# Patient Record
Sex: MALE | Race: WHITE | Employment: OTHER | URBAN - NONMETROPOLITAN AREA
[De-identification: names, ages, dates, MRNs, and addresses within clinical notes are randomized per-mention and may not be internally consistent; named-entity substitution may affect disease eponyms.]

---

## 2017-01-09 ENCOUNTER — TELEPHONE (OUTPATIENT)
Dept: OTHER | Age: 47
End: 2017-01-09

## 2017-01-12 ENCOUNTER — OFFICE VISIT (OUTPATIENT)
Dept: OTHER | Age: 47
End: 2017-01-12

## 2017-01-12 VITALS
HEART RATE: 100 BPM | DIASTOLIC BLOOD PRESSURE: 68 MMHG | HEIGHT: 72 IN | SYSTOLIC BLOOD PRESSURE: 108 MMHG | WEIGHT: 189 LBS | BODY MASS INDEX: 25.6 KG/M2

## 2017-01-12 DIAGNOSIS — E10.9 CONTROLLED DIABETES MELLITUS TYPE 1 WITHOUT COMPLICATIONS (HCC): Primary | ICD-10-CM

## 2017-01-12 PROCEDURE — G0108 DIAB MANAGE TRN  PER INDIV: HCPCS | Performed by: REGISTERED NURSE

## 2017-01-12 PROCEDURE — 83036 HEMOGLOBIN GLYCOSYLATED A1C: CPT | Performed by: REGISTERED NURSE

## 2017-02-28 ENCOUNTER — TELEPHONE (OUTPATIENT)
Dept: OTHER | Age: 47
End: 2017-02-28

## 2017-03-06 ENCOUNTER — TELEPHONE (OUTPATIENT)
Dept: OTHER | Age: 47
End: 2017-03-06

## 2017-03-07 ENCOUNTER — OFFICE VISIT (OUTPATIENT)
Dept: ENDOCRINOLOGY | Age: 47
End: 2017-03-07

## 2017-03-07 VITALS
HEART RATE: 80 BPM | WEIGHT: 202 LBS | BODY MASS INDEX: 27.36 KG/M2 | RESPIRATION RATE: 18 BRPM | HEIGHT: 72 IN | SYSTOLIC BLOOD PRESSURE: 131 MMHG | DIASTOLIC BLOOD PRESSURE: 72 MMHG

## 2017-03-07 DIAGNOSIS — E10.9 TYPE 1 DIABETES MELLITUS WITHOUT COMPLICATIONS (HCC): Primary | ICD-10-CM

## 2017-03-07 PROCEDURE — 99214 OFFICE O/P EST MOD 30 MIN: CPT | Performed by: CLINICAL NURSE SPECIALIST

## 2017-03-07 ASSESSMENT — ENCOUNTER SYMPTOMS
EYE REDNESS: 0
CONSTIPATION: 0
WHEEZING: 0
NAUSEA: 0
CHEST TIGHTNESS: 0
COUGH: 0
ABDOMINAL PAIN: 0
VOICE CHANGE: 0
DIARRHEA: 0
SHORTNESS OF BREATH: 0

## 2017-03-27 RX ORDER — ATORVASTATIN CALCIUM 10 MG/1
TABLET, FILM COATED ORAL
Qty: 90 TABLET | Refills: 1 | Status: SHIPPED | OUTPATIENT
Start: 2017-03-27 | End: 2017-09-18 | Stop reason: SDUPTHER

## 2017-04-10 ENCOUNTER — OFFICE VISIT (OUTPATIENT)
Dept: OTHER | Age: 47
End: 2017-04-10

## 2017-04-10 VITALS — WEIGHT: 202.2 LBS | HEIGHT: 72 IN | BODY MASS INDEX: 27.39 KG/M2

## 2017-04-10 DIAGNOSIS — E10.9 CONTROLLED DIABETES MELLITUS TYPE 1 WITHOUT COMPLICATIONS (HCC): Primary | ICD-10-CM

## 2017-04-10 LAB — HBA1C MFR BLD: 7.3 % (ref 4.3–5.7)

## 2017-04-10 RX ORDER — FAMOTIDINE 20 MG/1
20 TABLET, FILM COATED ORAL 2 TIMES DAILY
COMMUNITY
End: 2017-05-16

## 2017-05-16 ENCOUNTER — OFFICE VISIT (OUTPATIENT)
Dept: FAMILY MEDICINE CLINIC | Age: 47
End: 2017-05-16

## 2017-05-16 VITALS
DIASTOLIC BLOOD PRESSURE: 76 MMHG | BODY MASS INDEX: 26.72 KG/M2 | HEART RATE: 88 BPM | WEIGHT: 197 LBS | TEMPERATURE: 98 F | SYSTOLIC BLOOD PRESSURE: 108 MMHG | RESPIRATION RATE: 20 BRPM

## 2017-05-16 DIAGNOSIS — K21.9 GASTROESOPHAGEAL REFLUX DISEASE WITHOUT ESOPHAGITIS: Primary | ICD-10-CM

## 2017-05-16 DIAGNOSIS — G47.33 OSA (OBSTRUCTIVE SLEEP APNEA): ICD-10-CM

## 2017-05-16 DIAGNOSIS — N52.9 ERECTILE DYSFUNCTION, UNSPECIFIED ERECTILE DYSFUNCTION TYPE: ICD-10-CM

## 2017-05-16 PROCEDURE — 99213 OFFICE O/P EST LOW 20 MIN: CPT | Performed by: FAMILY MEDICINE

## 2017-05-16 RX ORDER — PANTOPRAZOLE SODIUM 40 MG/1
40 TABLET, DELAYED RELEASE ORAL DAILY
Qty: 30 TABLET | Refills: 3 | Status: SHIPPED | OUTPATIENT
Start: 2017-05-16 | End: 2017-06-22 | Stop reason: SDUPTHER

## 2017-05-16 RX ORDER — TADALAFIL 20 MG/1
TABLET ORAL
Qty: 20 TABLET | Refills: 5 | Status: SHIPPED | OUTPATIENT
Start: 2017-05-16 | End: 2018-02-14 | Stop reason: SDUPTHER

## 2017-05-16 ASSESSMENT — PATIENT HEALTH QUESTIONNAIRE - PHQ9
SUM OF ALL RESPONSES TO PHQ QUESTIONS 1-9: 0
SUM OF ALL RESPONSES TO PHQ9 QUESTIONS 1 & 2: 0
2. FEELING DOWN, DEPRESSED OR HOPELESS: 0
1. LITTLE INTEREST OR PLEASURE IN DOING THINGS: 0

## 2017-05-18 ENCOUNTER — TELEPHONE (OUTPATIENT)
Dept: ENDOCRINOLOGY | Age: 47
End: 2017-05-18

## 2017-05-21 ASSESSMENT — ENCOUNTER SYMPTOMS
EYE PAIN: 0
CONSTIPATION: 0
ABDOMINAL PAIN: 0
SHORTNESS OF BREATH: 0
SINUS PRESSURE: 0
ABDOMINAL DISTENTION: 0
RHINORRHEA: 0
DIARRHEA: 0
COUGH: 0
NAUSEA: 0
SORE THROAT: 0

## 2017-06-06 ENCOUNTER — OFFICE VISIT (OUTPATIENT)
Dept: ENDOCRINOLOGY | Age: 47
End: 2017-06-06

## 2017-06-06 VITALS
RESPIRATION RATE: 18 BRPM | SYSTOLIC BLOOD PRESSURE: 136 MMHG | DIASTOLIC BLOOD PRESSURE: 78 MMHG | BODY MASS INDEX: 27.22 KG/M2 | WEIGHT: 201 LBS | HEIGHT: 72 IN | HEART RATE: 92 BPM

## 2017-06-06 DIAGNOSIS — E78.00 PURE HYPERCHOLESTEROLEMIA: ICD-10-CM

## 2017-06-06 DIAGNOSIS — E10.65 TYPE 1 DIABETES MELLITUS WITH HYPERGLYCEMIA (HCC): Primary | ICD-10-CM

## 2017-06-06 PROCEDURE — 99213 OFFICE O/P EST LOW 20 MIN: CPT | Performed by: INTERNAL MEDICINE

## 2017-06-06 ASSESSMENT — ENCOUNTER SYMPTOMS: BLURRED VISION: 0

## 2017-06-09 RX ORDER — QUINAPRIL 10 MG/1
TABLET ORAL
Qty: 90 TABLET | Refills: 1 | Status: SHIPPED | OUTPATIENT
Start: 2017-06-09 | End: 2018-01-11 | Stop reason: SDUPTHER

## 2017-06-22 RX ORDER — PANTOPRAZOLE SODIUM 40 MG/1
40 TABLET, DELAYED RELEASE ORAL DAILY
Qty: 90 TABLET | Refills: 3 | Status: SHIPPED | OUTPATIENT
Start: 2017-06-22 | End: 2018-08-21 | Stop reason: SDUPTHER

## 2017-06-26 ENCOUNTER — INITIAL CONSULT (OUTPATIENT)
Dept: PULMONOLOGY | Age: 47
End: 2017-06-26

## 2017-06-26 VITALS
HEART RATE: 90 BPM | BODY MASS INDEX: 27.04 KG/M2 | SYSTOLIC BLOOD PRESSURE: 132 MMHG | OXYGEN SATURATION: 98 % | DIASTOLIC BLOOD PRESSURE: 80 MMHG | WEIGHT: 199.6 LBS | HEIGHT: 72 IN

## 2017-06-26 DIAGNOSIS — G47.10 HYPERSOMNIA: Primary | ICD-10-CM

## 2017-06-26 DIAGNOSIS — R06.83 SNORING: ICD-10-CM

## 2017-06-26 DIAGNOSIS — G47.9 SLEEP DISTURBANCE: ICD-10-CM

## 2017-06-26 DIAGNOSIS — R06.81 WITNESSED APNEIC SPELLS: ICD-10-CM

## 2017-06-26 PROCEDURE — 99203 OFFICE O/P NEW LOW 30 MIN: CPT | Performed by: INTERNAL MEDICINE

## 2017-07-03 ENCOUNTER — OFFICE VISIT (OUTPATIENT)
Dept: OTHER | Age: 47
End: 2017-07-03

## 2017-07-03 VITALS — WEIGHT: 202.6 LBS | BODY MASS INDEX: 27.44 KG/M2 | HEIGHT: 72 IN

## 2017-07-03 DIAGNOSIS — E10.9 CONTROLLED DIABETES MELLITUS TYPE 1 WITHOUT COMPLICATIONS (HCC): Primary | ICD-10-CM

## 2017-07-24 ENCOUNTER — HOSPITAL ENCOUNTER (OUTPATIENT)
Dept: PHARMACY | Age: 47
Setting detail: THERAPIES SERIES
Discharge: HOME | End: 2017-07-24
Payer: COMMERCIAL

## 2017-07-24 VITALS — WEIGHT: 202.6 LBS | HEIGHT: 72 IN | BODY MASS INDEX: 27.44 KG/M2

## 2017-07-24 LAB — HBA1C MFR BLD: 6.6 % (ref 4.3–5.7)

## 2017-07-24 PROCEDURE — G0108 DIAB MANAGE TRN  PER INDIV: HCPCS | Performed by: REGISTERED NURSE

## 2017-07-24 PROCEDURE — 83036 HEMOGLOBIN GLYCOSYLATED A1C: CPT | Performed by: REGISTERED NURSE

## 2017-07-24 PROCEDURE — 36416 COLLJ CAPILLARY BLOOD SPEC: CPT | Performed by: REGISTERED NURSE

## 2017-07-25 DIAGNOSIS — G47.33 OSA (OBSTRUCTIVE SLEEP APNEA): Primary | ICD-10-CM

## 2017-08-18 ENCOUNTER — HOSPITAL ENCOUNTER (OUTPATIENT)
Dept: SLEEP CENTER | Age: 47
Discharge: HOME | End: 2017-08-18
Payer: COMMERCIAL

## 2017-08-18 VITALS — BODY MASS INDEX: 26.95 KG/M2 | WEIGHT: 199 LBS | HEIGHT: 72 IN

## 2017-08-18 DIAGNOSIS — G47.33 OSA (OBSTRUCTIVE SLEEP APNEA): ICD-10-CM

## 2017-08-18 PROCEDURE — 95811 POLYSOM 6/>YRS CPAP 4/> PARM: CPT

## 2017-08-21 LAB — STATUS: NORMAL

## 2017-08-28 DIAGNOSIS — G47.33 OSA (OBSTRUCTIVE SLEEP APNEA): Primary | ICD-10-CM

## 2017-08-30 ENCOUNTER — TELEPHONE (OUTPATIENT)
Dept: SLEEP CENTER | Age: 47
End: 2017-08-30

## 2017-08-31 ENCOUNTER — TELEPHONE (OUTPATIENT)
Dept: SLEEP CENTER | Age: 47
End: 2017-08-31

## 2017-09-18 RX ORDER — ATORVASTATIN CALCIUM 10 MG/1
TABLET, FILM COATED ORAL
Qty: 90 TABLET | Refills: 1 | Status: SHIPPED | OUTPATIENT
Start: 2017-09-18 | End: 2018-04-09 | Stop reason: SDUPTHER

## 2017-10-09 ENCOUNTER — OFFICE VISIT (OUTPATIENT)
Dept: ENDOCRINOLOGY | Age: 47
End: 2017-10-09
Payer: COMMERCIAL

## 2017-10-09 VITALS
RESPIRATION RATE: 18 BRPM | HEIGHT: 72 IN | DIASTOLIC BLOOD PRESSURE: 72 MMHG | SYSTOLIC BLOOD PRESSURE: 138 MMHG | BODY MASS INDEX: 28.24 KG/M2 | HEART RATE: 99 BPM | WEIGHT: 208.5 LBS

## 2017-10-09 DIAGNOSIS — E10.9 TYPE 1 DIABETES MELLITUS WITHOUT COMPLICATIONS (HCC): Primary | ICD-10-CM

## 2017-10-09 PROCEDURE — 99213 OFFICE O/P EST LOW 20 MIN: CPT | Performed by: CLINICAL NURSE SPECIALIST

## 2017-10-09 ASSESSMENT — ENCOUNTER SYMPTOMS
ABDOMINAL PAIN: 0
CHEST TIGHTNESS: 0
NAUSEA: 0
SHORTNESS OF BREATH: 0
EYE REDNESS: 0
VOICE CHANGE: 0
DIARRHEA: 0
WHEEZING: 0
CONSTIPATION: 0
COUGH: 0

## 2017-10-09 NOTE — PROGRESS NOTES
constipation, diarrhea and nausea. Endocrine: Negative. Genitourinary: Negative for difficulty urinating, dysuria, frequency and hematuria. Musculoskeletal: Negative for gait problem, myalgias and neck pain. Skin: Negative for rash. Neurological: Negative for dizziness, tremors, facial asymmetry, weakness, numbness and headaches. Hematological: Negative for adenopathy. Psychiatric/Behavioral: Negative for agitation, confusion, sleep disturbance and suicidal ideas. The patient is not nervous/anxious and is not hyperactive. Objective:     Physical Exam   Constitutional: He is oriented to person, place, and time. He appears well-developed and well-nourished. No distress. HENT:   Head: Normocephalic and atraumatic. Right Ear: External ear normal.   Left Ear: External ear normal.   Nose: Nose normal.   Eyes: Conjunctivae and EOM are normal. Pupils are equal, round, and reactive to light. Right eye exhibits no discharge. Left eye exhibits no discharge. No scleral icterus. Neck: Normal range of motion. Neck supple. No thyromegaly present. Cardiovascular: Normal rate, regular rhythm, normal heart sounds and intact distal pulses. No murmur heard. Pulmonary/Chest: Effort normal and breath sounds normal. No stridor. No respiratory distress. He has no wheezes. He has no rales. Abdominal: Soft. Bowel sounds are normal. There is no tenderness. Musculoskeletal: Normal range of motion. He exhibits no edema or tenderness. Lymphadenopathy:     He has no cervical adenopathy. Neurological: He is alert and oriented to person, place, and time. No cranial nerve deficit. Coordination normal.   Skin: Skin is warm and dry. He is not diaphoretic. Psychiatric: He has a normal mood and affect. His behavior is normal. Judgment and thought content normal.       Patient was asked to remove their socks and shoes and a full foot exam was performed.   The following was found during the patient's foot exam: [x]   Normal Exam  Monofilament sensation   [x]   Normal                   []   Abnormal   Pulses   [x]   Normal                      []   Abnormal      Assessment:      /72 (Site: Right Arm, Position: Sitting, Cuff Size: Medium Adult)   Pulse 99   Resp 18   Ht 6' 0.01\" (1.829 m)   Wt 208 lb 8 oz (94.6 kg)   BMI 28.27 kg/m²   1. Type 1 diabetes mellitus without complications (HCC) - no change in insulin pump settings at this time at patient request - with current readings above goal. He wants to work on improved diet - he will change insertion site when he goes home after visit today - discussed glycemic goals, monitoring. Request readings to diabetes educator one week for insulin pump changes as needed. Patient states he is not comfortable making changes without educator, Tiny Odor assistance. Discussed with Tiny Odor as well.   [address]39 BUN 11, creat 0.7, malb/creat 8 on ACE       2. Hyperlipidemia - treated & tolerating 6/2017 chol 159, trig 41, HDL 72, LDL 79, liver panel normal    Plan:      Change insertion site today and every three days  Call in blood sugar readings in one week to diabetes educator       A1c before next visit  No Follow-up on file.        Electronically signed by JESÚS Ocasio on 10/9/2017 at 3:57 PM

## 2017-10-09 NOTE — PATIENT INSTRUCTIONS
Change insertion site today and every three days  Call in blood sugar readings in one week to diabetes educator         A1c before next visit    Follow up in 3 months

## 2017-10-18 ENCOUNTER — TELEPHONE (OUTPATIENT)
Dept: PHARMACY | Age: 47
End: 2017-10-18

## 2017-10-18 NOTE — TELEPHONE ENCOUNTER
Left message requesting that Ayla Yancey download his pump and let the clinci know when he has done so.

## 2017-12-11 ENCOUNTER — OFFICE VISIT (OUTPATIENT)
Dept: PULMONOLOGY | Age: 47
End: 2017-12-11
Payer: COMMERCIAL

## 2017-12-11 VITALS
BODY MASS INDEX: 28.74 KG/M2 | OXYGEN SATURATION: 97 % | HEART RATE: 97 BPM | SYSTOLIC BLOOD PRESSURE: 130 MMHG | HEIGHT: 72 IN | WEIGHT: 212.2 LBS | DIASTOLIC BLOOD PRESSURE: 80 MMHG

## 2017-12-11 DIAGNOSIS — G47.33 OSA ON CPAP: Primary | ICD-10-CM

## 2017-12-11 DIAGNOSIS — Z99.89 OSA ON CPAP: Primary | ICD-10-CM

## 2017-12-11 PROCEDURE — 99213 OFFICE O/P EST LOW 20 MIN: CPT | Performed by: INTERNAL MEDICINE

## 2017-12-11 NOTE — PROGRESS NOTES
place, and time. Psychiatric - Patient  has a normal mood and affect. ASSESSMENT  Obstructive Sleep Apnea (SCOUT)  Interface leak  Still AHI 9 (36.9 original AHI)--detailed report reviewed and mainly obst. events  13 wt gain      RECOMMENDATIONS    Change mask cushion  to mask  RTC 2 mos  Encourage wt reduction    He can call the sleep clinic for an earlier appointment if needed for worsening of sleep symptoms.      FOLLOW UP     2 months with a download

## 2017-12-20 ENCOUNTER — HOSPITAL ENCOUNTER (OUTPATIENT)
Dept: PHARMACY | Age: 47
Setting detail: THERAPIES SERIES
Discharge: HOME | End: 2017-12-20
Payer: COMMERCIAL

## 2017-12-20 VITALS
DIASTOLIC BLOOD PRESSURE: 72 MMHG | WEIGHT: 211 LBS | HEIGHT: 72 IN | SYSTOLIC BLOOD PRESSURE: 126 MMHG | BODY MASS INDEX: 28.58 KG/M2

## 2017-12-20 PROCEDURE — G0108 DIAB MANAGE TRN  PER INDIV: HCPCS | Performed by: REGISTERED NURSE

## 2017-12-20 NOTE — PROGRESS NOTES
Diabetes Clinic at [address], [address]  [phone] (phone)  [phone] (fax)    Patient ID: Meenakshi Bustillos 1970  Referring Provider: Dr. Romano Guardian     Patient's name and  were verified. Subjective:    He presents for His follow-up diabetic visit. He has type 1 diabetes mellitus. Home regimen includes: insulin He is noncompliant some of the time. Assessment:     Lab Results   Component Value Date    LABA1C 6.6 2017    LABA1C 7.3 10/04/2016    BUN 11 2017    CREATININE 0.7 2017     There were no vitals filed for this visit. Wt Readings from Last 3 Encounters:   17 212 lb 3.2 oz (96.3 kg)   10/09/17 208 lb 8 oz (94.6 kg)   17 199 lb (90.3 kg)     Ht Readings from Last 3 Encounters:   17 6' (1.829 m)   10/09/17 6' 0.01\" (1.829 m)   17 6' (1.829 m)       Glucose at 82 hrs PPD today resulted at 4mg/dl  Current monitoring regimen: home blood tests - 3-4 times daily  Home blood sugar trends: per insulin pump therapy  Any episodes of hypoglycemia? yes - 3-4 per week; 3-4pm is highest risk  Previous visit with dietician: yes  Current diet: 3 meals per day. B-smallest                            L- eat out-sand/ fries                            D- meat/ potato/ salad or veg  Current exercise: 2 times per week; walking and push ups ; 30 mins  Eye exam current (within one year): yes 17-laser lt and appt 18 rt eye laser scheduled  Any history of foot problems? no  Last foot exam: 3/2017  Immunizations up to date: no  Taking ASA:  Yes   Appropriate for use of MyChart Glucose Grid:  Yes    Focus: Follow pump visit with Tandem insulin pump/ Dexcom CGM. Weight is up approx 12# since summer. There are jerry high blood sugars than past visit. Liberty Wan report that since using CGM, he has gotten 'sloppier' with managing his blood sugars.  He also has become frustrated with higher BS's and has been adjusting dose of bolus corrections at times vs following pump calcucator. Reviewed need to follow pump settings so results can be evaluated. Also discussed need to check BS's for mealtime insulin dosing at this time. Adjustments made in pump settings and Andrew Munroe is to download again in 1 week for follow up. Next visit 8 weeks. DSME PLAN:   Discussed general issues about diabetes pathophysiology and management. Counseling at today's visit: basal vs bolus; review how settings calculate; pattern management. 1. Basal rates  12am 1.2 units, 4am 1.45 units, 10am 0.9 units, 4pm 1.45 units, 10pm 1.4 units       Carb ratio  12a 8, 4am 9, 10am 10, 4p 8, 10pm 7.5       Correction  12am 35, 10am 27, 4pm 25, 10pm 27  2. Eliminate or limit bedtime snack  3. Use blood sugar and carb amount for boluses--let pump calculate bolus amounts  4. Download pump in 1 week Tues 12/26/17    Meter download, medications, PMH and nursing assessment reviewed with DARIAN Carlos, Pharm D. Coleen Birmingham states He is willing to participate in this plan of care and verbalized understanding of all instructions provided. Teach back used to verify comprehension. Total time involved in direct patient education: 60 minutes.

## 2017-12-27 ENCOUNTER — HOSPITAL ENCOUNTER (OUTPATIENT)
Age: 47
Discharge: HOME | End: 2017-12-27
Payer: COMMERCIAL

## 2017-12-27 LAB
AVERAGE GLUCOSE: 135 MG/DL (ref 70–126)
HBA1C MFR BLD: 6.5 % (ref 4.4–6.4)

## 2017-12-27 PROCEDURE — 36415 COLL VENOUS BLD VENIPUNCTURE: CPT

## 2017-12-27 PROCEDURE — 83036 HEMOGLOBIN GLYCOSYLATED A1C: CPT

## 2017-12-28 ENCOUNTER — TELEPHONE (OUTPATIENT)
Dept: PHARMACY | Age: 47
End: 2017-12-28

## 2017-12-28 NOTE — TELEPHONE ENCOUNTER
Call to Andrew Munroe. Instructed on insulin pump changes  Change insulin pump settings as follows:  Midnight: 1.3 units/hr; 4am, 1.55 units/hr; 10 am, 1.1 units/hr; 4 pm, 1.4 units/hr; 10 pm 1.4 units/hr     ICR: Change the ratio at 4pm to 8.5. Leave all other ICR settings unchanged. Andrew Munroe voices understanding via teach back. Discussed needing to be more regimented with meals/ BG checks before meals and carb limits. Andrew Munroe states he has goals for the first of the year.

## 2017-12-28 NOTE — TELEPHONE ENCOUNTER
Change insulin pump settings as follows:  Midnight: 1.3 units/hr; 4am, 1.55 units/hr; 10 am, 1.1 units/hr; 4 pm, 1.4 units/hr; 10 pm 1.4 units/hr    ICR: Change the ratio at 4pm to 8.5. Leave all other ICR settings unchanged.

## 2018-01-12 RX ORDER — ESCITALOPRAM OXALATE 20 MG/1
20 TABLET ORAL DAILY
Qty: 90 TABLET | Refills: 0 | Status: SHIPPED | OUTPATIENT
Start: 2018-01-12 | End: 2018-04-10 | Stop reason: SDUPTHER

## 2018-01-12 RX ORDER — QUINAPRIL 10 MG/1
TABLET ORAL
Qty: 90 TABLET | Refills: 1 | Status: SHIPPED | OUTPATIENT
Start: 2018-01-12 | End: 2018-07-16 | Stop reason: SDUPTHER

## 2018-01-23 NOTE — TELEPHONE ENCOUNTER
Patients pharmacy contacted our office requesting a 90 day supply of testing strips. He was seen in the office last 10/2017 and is scheduled to return 2-21-18. RX is pending, please advise.

## 2018-01-29 ENCOUNTER — TELEPHONE (OUTPATIENT)
Dept: PHARMACY | Age: 48
End: 2018-01-29

## 2018-01-29 NOTE — TELEPHONE ENCOUNTER
Cancelled appointment today--ill. Call to see how he is doing/ BS's and to reschedule appointment. Left message to call clinic back.

## 2018-02-14 ENCOUNTER — OFFICE VISIT (OUTPATIENT)
Dept: FAMILY MEDICINE CLINIC | Age: 48
End: 2018-02-14
Payer: COMMERCIAL

## 2018-02-14 VITALS
RESPIRATION RATE: 20 BRPM | BODY MASS INDEX: 27.31 KG/M2 | TEMPERATURE: 98.5 F | DIASTOLIC BLOOD PRESSURE: 68 MMHG | HEART RATE: 104 BPM | WEIGHT: 201.4 LBS | SYSTOLIC BLOOD PRESSURE: 130 MMHG

## 2018-02-14 DIAGNOSIS — N52.9 ERECTILE DYSFUNCTION, UNSPECIFIED ERECTILE DYSFUNCTION TYPE: ICD-10-CM

## 2018-02-14 DIAGNOSIS — B34.9 VIRAL SYNDROME: Primary | ICD-10-CM

## 2018-02-14 PROCEDURE — 99213 OFFICE O/P EST LOW 20 MIN: CPT | Performed by: FAMILY MEDICINE

## 2018-02-14 RX ORDER — TADALAFIL 20 MG/1
TABLET ORAL
Qty: 20 TABLET | Refills: 5 | Status: SHIPPED | OUTPATIENT
Start: 2018-02-14 | End: 2018-07-16 | Stop reason: SDUPTHER

## 2018-02-18 ASSESSMENT — ENCOUNTER SYMPTOMS
EYE PAIN: 0
ABDOMINAL PAIN: 0
NAUSEA: 0
DIARRHEA: 0
WHEEZING: 1
ABDOMINAL DISTENTION: 0
SORE THROAT: 0
SINUS PRESSURE: 0
CONSTIPATION: 0
SHORTNESS OF BREATH: 0
RHINORRHEA: 0

## 2018-03-08 ENCOUNTER — OFFICE VISIT (OUTPATIENT)
Dept: ENDOCRINOLOGY | Age: 48
End: 2018-03-08
Payer: COMMERCIAL

## 2018-03-08 VITALS
HEIGHT: 72 IN | HEART RATE: 94 BPM | SYSTOLIC BLOOD PRESSURE: 149 MMHG | DIASTOLIC BLOOD PRESSURE: 84 MMHG | RESPIRATION RATE: 16 BRPM | WEIGHT: 201 LBS | BODY MASS INDEX: 27.22 KG/M2

## 2018-03-08 DIAGNOSIS — E10.8 CONTROLLED DIABETES MELLITUS TYPE 1 WITH COMPLICATIONS (HCC): Primary | ICD-10-CM

## 2018-03-08 DIAGNOSIS — E78.00 PURE HYPERCHOLESTEROLEMIA: ICD-10-CM

## 2018-03-08 LAB — HBA1C MFR BLD: 6.8 %

## 2018-03-08 PROCEDURE — 99215 OFFICE O/P EST HI 40 MIN: CPT | Performed by: INTERNAL MEDICINE

## 2018-03-08 PROCEDURE — 83036 HEMOGLOBIN GLYCOSYLATED A1C: CPT | Performed by: INTERNAL MEDICINE

## 2018-03-08 ASSESSMENT — ENCOUNTER SYMPTOMS
PHOTOPHOBIA: 0
ABDOMINAL PAIN: 0
NAUSEA: 0
ABDOMINAL DISTENTION: 0
WHEEZING: 0
VOMITING: 0
DIARRHEA: 0
CONSTIPATION: 0
CHEST TIGHTNESS: 0
SHORTNESS OF BREATH: 0
STRIDOR: 0

## 2018-03-08 NOTE — PROGRESS NOTES
distress   Oropharynx: normal findings: lips normal without lesions, buccal mucosa normal, gums healthy and tongue mid    Eyes:  negative findings: lids and lashes normal, conjunctivae and sclerae normal, corneas clear        Neck: no adenopathy, no carotid bruit, no JVD, supple, symmetrical, trachea midline and thyroid not enlarged, symmetric, no tenderness/mass/nodules   Thyroid:  no palpable nodule   Lung: clear to auscultation bilaterally   Heart:  regular rate and rhythm, S1, S2 normal, no murmur, click, rub or gallop    Abdomen: soft, non-tender; bowel sounds normal; no masses,  no organomegaly   Extremities: extremities normal, atraumatic, no cyanosis or edema    Pulses: 2+ and symmetric   Skin: warm and dry, no hyperpigmentation, vitiligo, or suspicious lesions   Neuro: normal without focal findings, mental status, speech normal, alert and oriented x3       Patient was asked to remove their socks and shoes and a full foot exam was performed. The following was found during the patient's foot exam:   [x]  Normal Exam  Monofilament sensation [x] Normal  [] Abnormal   Pulses [x] Normal  [] Abnormal   No lesions      Labs:    Lab Results   Component Value Date    WBC 6.2 06/26/2015    HGB 16.0 06/26/2015    HCT 47.0 06/26/2015     06/26/2015    CHOL 159 06/17/2017    TRIG 41 06/17/2017    HDL 72 06/17/2017    ALT 22 06/17/2017    AST 18 06/17/2017     06/17/2017    K 5.0 06/17/2017     06/17/2017    CREATININE 0.7 06/17/2017    CALCIUM 9.6 06/17/2017    BUN 11 06/17/2017    CO2 26 06/17/2017    TSH 2.640 06/17/2017    T4FREE 1.32 06/17/2017    LABA1C 6.8 03/08/2018    LABMICR < 1.20 06/17/2017       ASSESSMENT/PLAN:      1. Controlled diabetes mellitus type 1 with complications (HCC)  NIQ6T at goal.  Trend noted for hyperglycemia overnight. Increase carb ratio at 10 PM from 1:7. g to 1:6.5 g.  Other settings stay the same.   Script sent for glucagon in the event of severe lows as well as ketone strips to test for DKA. Check Bgs 5-6 times per day  BG Goal  Fasting and premeal  mg/dl ; 2hr pp < 180 mg/dl. HbA1c goal < 7 %  Call if he has  hypoglycemia    - POCT glycosylated hemoglobin (Hb A1C)  -  DIABETES FOOT EXAM  - TSH without Reflex; Future  - Basic Metabolic Panel; Future  - Hemoglobin A1C; Future  - glucagon 1 MG injection; Inject 1 mg into the skin See Admin Instructions Follow package directions for low blood sugar. Dispense: 1 kit; Refill: 3  - acetone, urine, test strip; Please test urine for ketones if you have nausea, vomiting and abdominal pain. Dispense: 10 strip; Refill: 1    2. Pure hypercholesterolemia  Continue statin. I spent 45 minutes with this patient. Greater than 50% of visit discussing glycemic management, reviewing/discussing pump download, diagnoses as above, reviewing/ordering labs, changing/addressing medication, answering questions    Orders Placed This Encounter   Procedures    TSH without Reflex     Standing Status:   Future     Standing Expiration Date:   3/8/2019    Basic Metabolic Panel     Standing Status:   Future     Standing Expiration Date:   3/8/2019    Hemoglobin A1C     Standing Status:   Future     Standing Expiration Date:   3/8/2019    POCT glycosylated hemoglobin (Hb A1C)     DIABETES FOOT EXAM         Return in about 3 months (around 6/8/2018).

## 2018-04-09 RX ORDER — ATORVASTATIN CALCIUM 10 MG/1
TABLET, FILM COATED ORAL
Qty: 90 TABLET | Refills: 1 | Status: SHIPPED | OUTPATIENT
Start: 2018-04-09 | End: 2018-11-26 | Stop reason: SDUPTHER

## 2018-04-10 ENCOUNTER — TELEPHONE (OUTPATIENT)
Dept: INTERNAL MEDICINE CLINIC | Age: 48
End: 2018-04-10

## 2018-04-10 RX ORDER — ESCITALOPRAM OXALATE 20 MG/1
20 TABLET ORAL DAILY
Qty: 90 TABLET | Refills: 2 | Status: SHIPPED | OUTPATIENT
Start: 2018-04-10 | End: 2019-02-07 | Stop reason: SDUPTHER

## 2018-05-14 ENCOUNTER — OFFICE VISIT (OUTPATIENT)
Dept: INTERNAL MEDICINE CLINIC | Age: 48
End: 2018-05-14
Payer: COMMERCIAL

## 2018-05-14 VITALS
BODY MASS INDEX: 27.96 KG/M2 | SYSTOLIC BLOOD PRESSURE: 118 MMHG | HEIGHT: 72 IN | WEIGHT: 206.4 LBS | DIASTOLIC BLOOD PRESSURE: 62 MMHG

## 2018-05-14 DIAGNOSIS — E10.8 CONTROLLED DIABETES MELLITUS TYPE 1 WITH COMPLICATIONS (HCC): ICD-10-CM

## 2018-05-14 PROCEDURE — G0108 DIAB MANAGE TRN  PER INDIV: HCPCS | Performed by: NURSE PRACTITIONER

## 2018-05-14 PROCEDURE — 99999 PR OFFICE/OUTPT VISIT,PROCEDURE ONLY: CPT | Performed by: NURSE PRACTITIONER

## 2018-07-11 ENCOUNTER — HOSPITAL ENCOUNTER (OUTPATIENT)
Age: 48
Discharge: HOME | End: 2018-07-11
Payer: COMMERCIAL

## 2018-07-11 LAB
ANION GAP SERPL CALCULATED.3IONS-SCNC: 10 MEQ/L (ref 8–16)
AVERAGE GLUCOSE: 129 MG/DL (ref 70–126)
BUN BLDV-MCNC: 12 MG/DL (ref 7–22)
CALCIUM SERPL-MCNC: 9.3 MG/DL (ref 8.5–10.5)
CHLORIDE BLD-SCNC: 104 MEQ/L (ref 98–111)
CO2: 28 MEQ/L (ref 23–33)
CREAT SERPL-MCNC: 0.7 MG/DL (ref 0.4–1.2)
GFR SERPL CREATININE-BSD FRML MDRD: > 90 ML/MIN/1.73M2
GLUCOSE BLD-MCNC: 113 MG/DL (ref 70–108)
HBA1C MFR BLD: 6.3 % (ref 4.4–6.4)
POTASSIUM SERPL-SCNC: 5 MEQ/L (ref 3.5–5.2)
SODIUM BLD-SCNC: 142 MEQ/L (ref 135–145)
TSH SERPL DL<=0.05 MIU/L-ACNC: 0.98 UIU/ML (ref 0.4–4.2)

## 2018-07-11 PROCEDURE — 83036 HEMOGLOBIN GLYCOSYLATED A1C: CPT

## 2018-07-11 PROCEDURE — 36415 COLL VENOUS BLD VENIPUNCTURE: CPT

## 2018-07-11 PROCEDURE — 80048 BASIC METABOLIC PNL TOTAL CA: CPT

## 2018-07-11 PROCEDURE — 84443 ASSAY THYROID STIM HORMONE: CPT

## 2018-07-16 ENCOUNTER — OFFICE VISIT (OUTPATIENT)
Dept: INTERNAL MEDICINE CLINIC | Age: 48
End: 2018-07-16
Payer: COMMERCIAL

## 2018-07-16 VITALS
HEART RATE: 86 BPM | RESPIRATION RATE: 16 BRPM | SYSTOLIC BLOOD PRESSURE: 134 MMHG | BODY MASS INDEX: 27.02 KG/M2 | DIASTOLIC BLOOD PRESSURE: 72 MMHG | WEIGHT: 199.5 LBS | HEIGHT: 72 IN

## 2018-07-16 DIAGNOSIS — E10.65 TYPE 1 DIABETES MELLITUS WITH HYPERGLYCEMIA (HCC): Primary | ICD-10-CM

## 2018-07-16 DIAGNOSIS — E78.00 PURE HYPERCHOLESTEROLEMIA: ICD-10-CM

## 2018-07-16 DIAGNOSIS — N52.9 ERECTILE DYSFUNCTION, UNSPECIFIED ERECTILE DYSFUNCTION TYPE: ICD-10-CM

## 2018-07-16 PROCEDURE — 99214 OFFICE O/P EST MOD 30 MIN: CPT | Performed by: NURSE PRACTITIONER

## 2018-07-16 RX ORDER — QUINAPRIL 10 MG/1
TABLET ORAL
Qty: 90 TABLET | Refills: 1 | Status: SHIPPED | OUTPATIENT
Start: 2018-07-16 | End: 2019-02-07 | Stop reason: SDUPTHER

## 2018-07-16 RX ORDER — TADALAFIL 20 MG/1
TABLET ORAL
Qty: 40 TABLET | Refills: 1 | Status: SHIPPED | OUTPATIENT
Start: 2018-07-16 | End: 2019-12-10 | Stop reason: SDUPTHER

## 2018-07-16 ASSESSMENT — PATIENT HEALTH QUESTIONNAIRE - PHQ9
SUM OF ALL RESPONSES TO PHQ9 QUESTIONS 1 & 2: 0
1. LITTLE INTEREST OR PLEASURE IN DOING THINGS: 0
2. FEELING DOWN, DEPRESSED OR HOPELESS: 0
SUM OF ALL RESPONSES TO PHQ QUESTIONS 1-9: 0

## 2018-07-16 ASSESSMENT — ENCOUNTER SYMPTOMS
VOMITING: 0
ABDOMINAL PAIN: 0
TROUBLE SWALLOWING: 0
NAUSEA: 1
VOICE CHANGE: 0
SHORTNESS OF BREATH: 0

## 2018-07-16 NOTE — PROGRESS NOTES
Subjective:      Patient ID: Alivia Mcdonough is a 56 y.o. male. HPI     Presents for follow up appointment regarding type 1 diabetes, diagnosed in 1982. Last seen by Dr. Nneka Barrios 3/8/2018. Also following with the diabetic clinic, last visit with JOSÉ MIGUEL 5/14/2018. Currently maintained on the Tslim for the last 2 years. Reports changing sites every 3-4 times. No abdominal scar tissue reported. Not wearing Dexcom sensor. Current pump settings: basal 0000 1.2 0400 1.6, 1000 1.1, 1600 1.4, 2200 1.4, ISF 35/27/25/27, ICR 8/9/10/8.5/6.5 target 100-120. Checking glucose 4-6 times per day with good compliance. See media section for details. Labile BS readings noted. No clear trend. A1c 6.3%. Hypo and hyperglycemia are minimized. Has a glucagon pen, has not ever used it. No seizure induced hypoglycemia reported. Lowest BS on pump 48. Symptomatic when BS <60 and include feeling ill and diaphoresis. Exercising daily with 20 min cardio and resistance training. Carb loads prior to exercise to prevent hypoglycemia. Eating on average 50-60 carbs with meals. Denies polyuria or polydipsia. No numbness or tingling to the lower extremities reported. Vision changes are reported, had injection today. HX of retinopathy. Past Medical History:   Diagnosis Date    Anxiety     Ganglion cyst     Hyperlipidemia     Type I (juvenile type) diabetes mellitus without mention of complication, not stated as uncontrolled     12/1982     Family History   Problem Relation Age of Onset    Heart Disease Father     Asthma Father     High Blood Pressure Father      Social History     Social History    Marital status:      Spouse name: N/A    Number of children: N/A    Years of education: N/A     Occupational History    Not on file.      Social History Main Topics    Smoking status: Former Smoker     Packs/day: 0.25     Years: 15.00     Quit date: 1/26/2002    Smokeless tobacco: Never Used    Alcohol use 0.0 oz/week      Comment: 2-3 light

## 2018-08-14 ENCOUNTER — ANESTHESIA EVENT (OUTPATIENT)
Dept: OPERATING ROOM | Age: 48
End: 2018-08-14
Payer: COMMERCIAL

## 2018-08-15 ENCOUNTER — HOSPITAL ENCOUNTER (OUTPATIENT)
Age: 48
Setting detail: OUTPATIENT SURGERY
Discharge: HOME | End: 2018-08-15
Attending: OPHTHALMOLOGY | Admitting: OPHTHALMOLOGY
Payer: COMMERCIAL

## 2018-08-15 ENCOUNTER — ANESTHESIA (OUTPATIENT)
Dept: OPERATING ROOM | Age: 48
End: 2018-08-15
Payer: COMMERCIAL

## 2018-08-15 VITALS
OXYGEN SATURATION: 99 % | RESPIRATION RATE: 14 BRPM | WEIGHT: 200 LBS | HEART RATE: 72 BPM | DIASTOLIC BLOOD PRESSURE: 69 MMHG | HEIGHT: 72 IN | BODY MASS INDEX: 27.09 KG/M2 | SYSTOLIC BLOOD PRESSURE: 127 MMHG | TEMPERATURE: 97 F

## 2018-08-15 VITALS — OXYGEN SATURATION: 99 % | SYSTOLIC BLOOD PRESSURE: 127 MMHG | DIASTOLIC BLOOD PRESSURE: 78 MMHG

## 2018-08-15 PROBLEM — H33.41 RETINAL DETACHMENT, TRACTIONAL, RIGHT: Status: ACTIVE | Noted: 2018-08-15

## 2018-08-15 LAB
EKG ATRIAL RATE: 72 BPM
EKG P AXIS: 60 DEGREES
EKG P-R INTERVAL: 164 MS
EKG Q-T INTERVAL: 384 MS
EKG QRS DURATION: 80 MS
EKG QTC CALCULATION (BAZETT): 420 MS
EKG R AXIS: 48 DEGREES
EKG T AXIS: 46 DEGREES
EKG VENTRICULAR RATE: 72 BPM
GLUCOSE BLD-MCNC: 138 MG/DL (ref 75–110)
GLUCOSE BLD-MCNC: 140 MG/DL (ref 75–110)
GLUCOSE BLD-MCNC: 223 MG/DL (ref 74–100)
POC POTASSIUM: 4.2 MMOL/L (ref 3.5–4.5)

## 2018-08-15 PROCEDURE — 3600000014 HC SURGERY LEVEL 4 ADDTL 15MIN: Performed by: OPHTHALMOLOGY

## 2018-08-15 PROCEDURE — 3600000004 HC SURGERY LEVEL 4 BASE: Performed by: OPHTHALMOLOGY

## 2018-08-15 PROCEDURE — 6360000002 HC RX W HCPCS: Performed by: OPHTHALMOLOGY

## 2018-08-15 PROCEDURE — 6370000000 HC RX 637 (ALT 250 FOR IP): Performed by: OPHTHALMOLOGY

## 2018-08-15 PROCEDURE — 2709999900 HC NON-CHARGEABLE SUPPLY: Performed by: OPHTHALMOLOGY

## 2018-08-15 PROCEDURE — 3700000001 HC ADD 15 MINUTES (ANESTHESIA): Performed by: OPHTHALMOLOGY

## 2018-08-15 PROCEDURE — 82947 ASSAY GLUCOSE BLOOD QUANT: CPT

## 2018-08-15 PROCEDURE — 2580000003 HC RX 258: Performed by: ANESTHESIOLOGY

## 2018-08-15 PROCEDURE — 84132 ASSAY OF SERUM POTASSIUM: CPT

## 2018-08-15 PROCEDURE — 6360000002 HC RX W HCPCS: Performed by: NURSE ANESTHETIST, CERTIFIED REGISTERED

## 2018-08-15 PROCEDURE — 2500000003 HC RX 250 WO HCPCS: Performed by: OPHTHALMOLOGY

## 2018-08-15 PROCEDURE — 3700000000 HC ANESTHESIA ATTENDED CARE: Performed by: OPHTHALMOLOGY

## 2018-08-15 PROCEDURE — 7100000041 HC SPAR PHASE II RECOVERY - ADDTL 15 MIN: Performed by: OPHTHALMOLOGY

## 2018-08-15 PROCEDURE — 2580000003 HC RX 258: Performed by: OPHTHALMOLOGY

## 2018-08-15 PROCEDURE — 93005 ELECTROCARDIOGRAM TRACING: CPT

## 2018-08-15 PROCEDURE — 7100000040 HC SPAR PHASE II RECOVERY - FIRST 15 MIN: Performed by: OPHTHALMOLOGY

## 2018-08-15 PROCEDURE — 2720000010 HC SURG SUPPLY STERILE: Performed by: OPHTHALMOLOGY

## 2018-08-15 RX ORDER — FENTANYL CITRATE 50 UG/ML
INJECTION, SOLUTION INTRAMUSCULAR; INTRAVENOUS PRN
Status: DISCONTINUED | OUTPATIENT
Start: 2018-08-15 | End: 2018-08-15 | Stop reason: SDUPTHER

## 2018-08-15 RX ORDER — ERYTHROMYCIN 5 MG/G
OINTMENT OPHTHALMIC PRN
Status: DISCONTINUED | OUTPATIENT
Start: 2018-08-15 | End: 2018-08-15 | Stop reason: HOSPADM

## 2018-08-15 RX ORDER — BALANCED SALT SOLUTION ENRICHED WITH BICARBONATE, DEXTROSE, AND GLUTATHIONE
KIT INTRAOCULAR PRN
Status: DISCONTINUED | OUTPATIENT
Start: 2018-08-15 | End: 2018-08-15 | Stop reason: HOSPADM

## 2018-08-15 RX ORDER — SODIUM CHLORIDE, SODIUM LACTATE, POTASSIUM CHLORIDE, CALCIUM CHLORIDE 600; 310; 30; 20 MG/100ML; MG/100ML; MG/100ML; MG/100ML
INJECTION, SOLUTION INTRAVENOUS CONTINUOUS
Status: DISCONTINUED | OUTPATIENT
Start: 2018-08-15 | End: 2018-08-15 | Stop reason: HOSPADM

## 2018-08-15 RX ORDER — LIDOCAINE HYDROCHLORIDE 10 MG/ML
1 INJECTION, SOLUTION EPIDURAL; INFILTRATION; INTRACAUDAL; PERINEURAL
Status: DISCONTINUED | OUTPATIENT
Start: 2018-08-15 | End: 2018-08-15 | Stop reason: HOSPADM

## 2018-08-15 RX ORDER — MIDAZOLAM HYDROCHLORIDE 1 MG/ML
INJECTION INTRAMUSCULAR; INTRAVENOUS PRN
Status: DISCONTINUED | OUTPATIENT
Start: 2018-08-15 | End: 2018-08-15 | Stop reason: SDUPTHER

## 2018-08-15 RX ORDER — CYCLOPENTOLATE HYDROCHLORIDE 10 MG/ML
1 SOLUTION/ DROPS OPHTHALMIC EVERY 5 MIN PRN
Status: COMPLETED | OUTPATIENT
Start: 2018-08-15 | End: 2018-08-15

## 2018-08-15 RX ORDER — TIMOLOL MALEATE 5 MG/ML
SOLUTION/ DROPS OPHTHALMIC PRN
Status: DISCONTINUED | OUTPATIENT
Start: 2018-08-15 | End: 2018-08-15 | Stop reason: HOSPADM

## 2018-08-15 RX ORDER — BUPIVACAINE HYDROCHLORIDE 7.5 MG/ML
INJECTION, SOLUTION EPIDURAL; RETROBULBAR PRN
Status: DISCONTINUED | OUTPATIENT
Start: 2018-08-15 | End: 2018-08-15 | Stop reason: HOSPADM

## 2018-08-15 RX ORDER — CEFAZOLIN SODIUM 500 MG/2.2ML
INJECTION, POWDER, FOR SOLUTION INTRAMUSCULAR; INTRAVENOUS PRN
Status: DISCONTINUED | OUTPATIENT
Start: 2018-08-15 | End: 2018-08-15 | Stop reason: HOSPADM

## 2018-08-15 RX ORDER — PHENYLEPHRINE HYDROCHLORIDE 100 MG/ML
1 SOLUTION/ DROPS OPHTHALMIC EVERY 5 MIN PRN
Status: COMPLETED | OUTPATIENT
Start: 2018-08-15 | End: 2018-08-15

## 2018-08-15 RX ORDER — 0.9 % SODIUM CHLORIDE 0.9 %
VIAL (ML) INJECTION PRN
Status: DISCONTINUED | OUTPATIENT
Start: 2018-08-15 | End: 2018-08-15 | Stop reason: HOSPADM

## 2018-08-15 RX ORDER — LIDOCAINE HYDROCHLORIDE 20 MG/ML
INJECTION, SOLUTION INFILTRATION; PERINEURAL PRN
Status: DISCONTINUED | OUTPATIENT
Start: 2018-08-15 | End: 2018-08-15 | Stop reason: HOSPADM

## 2018-08-15 RX ORDER — ATROPINE SULFATE 10 MG/ML
1 SOLUTION/ DROPS OPHTHALMIC
Status: COMPLETED | OUTPATIENT
Start: 2018-08-15 | End: 2018-08-15

## 2018-08-15 RX ADMIN — GENTAMICIN, PREDNISOLONE ACETATE 1 DROP: 3; 10 SUSPENSION/ DROPS OPHTHALMIC at 10:44

## 2018-08-15 RX ADMIN — CYCLOPENTOLATE HYDROCHLORIDE 1 DROP: 10 SOLUTION/ DROPS OPHTHALMIC at 10:43

## 2018-08-15 RX ADMIN — SODIUM CHLORIDE, POTASSIUM CHLORIDE, SODIUM LACTATE AND CALCIUM CHLORIDE: 600; 310; 30; 20 INJECTION, SOLUTION INTRAVENOUS at 10:09

## 2018-08-15 RX ADMIN — FENTANYL CITRATE 50 MCG: 50 INJECTION INTRAMUSCULAR; INTRAVENOUS at 12:10

## 2018-08-15 RX ADMIN — PHENYLEPHRINE HYDROCHLORIDE 1 DROP: 100 SOLUTION/ DROPS OPHTHALMIC at 10:43

## 2018-08-15 RX ADMIN — PHENYLEPHRINE HYDROCHLORIDE 1 DROP: 100 SOLUTION/ DROPS OPHTHALMIC at 10:18

## 2018-08-15 RX ADMIN — CYCLOPENTOLATE HYDROCHLORIDE 1 DROP: 10 SOLUTION/ DROPS OPHTHALMIC at 10:19

## 2018-08-15 RX ADMIN — PHENYLEPHRINE HYDROCHLORIDE 1 DROP: 100 SOLUTION/ DROPS OPHTHALMIC at 10:35

## 2018-08-15 RX ADMIN — FENTANYL CITRATE 50 MCG: 50 INJECTION INTRAMUSCULAR; INTRAVENOUS at 12:09

## 2018-08-15 RX ADMIN — ATROPINE SULFATE 1 DROP: 10 SOLUTION/ DROPS OPHTHALMIC at 10:16

## 2018-08-15 RX ADMIN — MIDAZOLAM HYDROCHLORIDE 2 MG: 1 INJECTION, SOLUTION INTRAMUSCULAR; INTRAVENOUS at 12:09

## 2018-08-15 RX ADMIN — CYCLOPENTOLATE HYDROCHLORIDE 1 DROP: 10 SOLUTION/ DROPS OPHTHALMIC at 10:35

## 2018-08-15 ASSESSMENT — PULMONARY FUNCTION TESTS
PIF_VALUE: 1
PIF_VALUE: 4
PIF_VALUE: 1

## 2018-08-15 ASSESSMENT — PAIN SCALES - GENERAL
PAINLEVEL_OUTOF10: 0

## 2018-08-15 ASSESSMENT — LIFESTYLE VARIABLES: SMOKING_STATUS: 0

## 2018-08-15 ASSESSMENT — PAIN - FUNCTIONAL ASSESSMENT: PAIN_FUNCTIONAL_ASSESSMENT: 0-10

## 2018-08-15 NOTE — H&P
MG injection Inject 1 mg into the skin See Admin Instructions Follow package directions for low blood sugar. 3/8/18   Melody Burnette MD   acetone, urine, test strip Please test urine for ketones if you have nausea, vomiting and abdominal pain. 3/8/18   Melody Burnette MD   pantoprazole (PROTONIX) 40 MG tablet Take 1 tablet by mouth daily  Patient taking differently: Take 40 mg by mouth nightly  6/22/17   JESÚS Gomez - CNP   Insulin Syringes, Disposable, U-100 0.3 ML MISC 1 each by Does not apply route daily 2/8/16   Jf Hernandez MD   Insulin Syringe-Needle U-100 31G X 5/16\" 0.5 ML MISC 1 each by Does not apply route 4 times daily 12/21/15   Jf Hernandez MD     Past Medical History    has a past medical history of Anxiety; Detached retina; Diabetic retinopathy ([facility] [address] Ganglion cyst; Heart burn; Hyperlipidemia; Hypertension; Insulin pump in place; Neuropathy; Pneumonia; Retinal detachment; Sleep apnea; Type I (juvenile type) diabetes mellitus without mention of complication, not stated as uncontrolled; and Wears glasses. Past Surgical History   has a past surgical history that includes Kegley tooth extraction (2005); cyst removal (1992); Refractive surgery (2016); and eye surgery. Social History   reports that he quit smoking about 16 years ago. He has a 3.75 pack-year smoking history. His smokeless tobacco use includes Chew.   reports that he drinks about 1.8 oz of alcohol per week . reports that he does not use drugs. Marital Status   Occupation  for [employer]   Family History  Family Status   Relation Status    Mother Alive    Father Alive    Sister Alive   hospitals     family history includes Asthma in his father; Heart Disease in his father; High Blood Pressure in his father. OBJECTIVE:   VITALS:  height is 6' (1.829 m) and weight is 200 lb (90.7 kg). His temporal temperature is 98.4 °F (36.9 °C). His blood pressure is 145/85 (abnormal) and his pulse is 73. His respiration is 20 and oxygen saturation is 98%. CONSTITUTIONAL:alert & orientated x 3, no acute distress. Friendly. SKIN:  Warm and dry, no rashes   HEAD:  Normocephalic, atraumatic   EYES: EOMI. Wearing glasses. Right pupil dilated s/p eye drops  EARS:  Hearing grossly WNL. NOSE:  Nares patent. No rhinorrhea   MOUTH/THROAT:  Benign  NECK:supple, no lymphadenopathy  LUNGS: Respirations even and non-labored. Clear to auscultation bilaterally, no wheezes, rales, or rhonchi. CARDIOVASCULAR: Regular rate and rhythm. ABDOMEN: soft, non tender, non distended, no masses or organomegaly, insulin pump left abdomen   EXTREMITIES: no edema bilateral lower extremities. Peripheral pulses are intact     IMPRESSIONS:   1. Retinal detachment       Diagnosis Date    Anxiety     Detached retina 08/2018    right    Diabetic retinopathy ([address]     bilat, has had laser and injections    Ganglion cyst     Heart burn     Hyperlipidemia     Hypertension     Insulin pump in place     Neuropathy     Pneumonia     at age 26   [facility] Retinal detachment     RIGHT    Sleep apnea     has Cpap at home but does not wear like suppose to    Type I (juvenile type) diabetes mellitus without mention of complication, not stated as uncontrolled     12/1982    Wears glasses     usually uses contact lenses   2.    PLANS:   1. Vitrectomy- OD    AXEL LEVIN CNP  Electronically signed 8/15/2018 at 10:50 AM

## 2018-08-15 NOTE — ANESTHESIA POSTPROCEDURE EVALUATION
Department of Anesthesiology  Postprocedure Note    Patient: Marilu Colon  MRN: [id number]  YOB: 1970  Date of evaluation: 8/15/2018  Time:  1:59 PM     Procedure Summary     Date:  08/15/18 Room / Location:  [facility] OR  / [facility] OR    Anesthesia Start:  4351 Anesthesia Stop:  1310    Procedure:  VITRECTOMY 23 GAUGE, MEMBRANE PEELING, AIR FLUID EXCHANGE, AIR GAS EXCHANGE WITH 16% C3F8, ENDOLASER, 200 MSECONDS, 200 MWATTS, 809 PULSES (Right ) Diagnosis:  (TRACTION RETINAL DETACHMENT )    Surgeon:  Trang Terrazas MD Responsible Provider:  Monique Kerr MD    Anesthesia Type:  MAC ASA Status:  4          Anesthesia Type: MAC    Tony Phase I:      Tony Phase II: Tony Score: 10    Last vitals: Reviewed and per EMR flowsheets.        Anesthesia Post Evaluation    Patient location during evaluation: PACU  Patient participation: complete - patient participated  Level of consciousness: awake and alert  Pain score: 0  Nausea & Vomiting: no nausea  Cardiovascular status: hemodynamically stable  Respiratory status: room air  Hydration status: euvolemic

## 2018-08-15 NOTE — PROGRESS NOTES
Discharge instructions including prescription (tylenol #3), eye drops, and eye kit reviewed with patient and wife. Both acknowledged understanding.

## 2018-08-15 NOTE — ANESTHESIA PRE PROCEDURE
Department of Anesthesiology  Preprocedure Note       Name:  Gordy Medina   Age:  56 y.o.  :  1970                                          MRN:  [id number]         Date:  8/15/2018      Surgeon: Genny Mejia):  Alfonso Uribe MD    Procedure: Procedure(s):  VITRECTOMY 23 GAUGE, MEMBRANE PEELING, GAS FLUID EXCHANGE, LASER    Department of Anesthesiology  Pre-Anesthesia Evaluation/Consultation         Name:  Gordy Medina                                         Age:  56 y.o.   MRN:  [id number]             Medications  Current Facility-Administered Medications   Medication Dose Route Frequency Provider Last Rate Last Dose    atropine 1 % ophthalmic solution 1 drop  1 drop Right Eye On Call to [facility]., MD        gentamicin-prednisoLONE 0.3-1 % ophthalmic drops 1 drop  1 drop Right Eye On Call to [facility] 67., MD        cyclopentolate (CYCLOGYL) 1 % ophthalmic solution 1 drop  1 drop Right Eye Q5 Min PRN Alfonso Uribe MD        phenylephrine (SHERRY-SYNEPHRINE) 10 % ophthalmic solution 1 drop  1 drop Right Eye Q5 Min PRN Alfonso Uribe MD        lactated ringers infusion   Intravenous Continuous Christian West MD        lidocaine PF 1 % injection 1 mL  1 mL Intradermal Once PRN Christian West MD           No Known Allergies  Patient Active Problem List   Diagnosis    Hyperlipidemia    Anxiety    External otitis    Diabetes type 1, controlled ([address]     Past Medical History:   Diagnosis Date    Anxiety     Detached retina 2018    right    Diabetic retinopathy ([address]     bilat, has had laser and injections    Ganglion cyst     Heart burn     Hyperlipidemia     Hypertension     Pneumonia     at age 26    Sleep apnea     has Cpap at home but does not wear like suppose to    Type I (juvenile type) diabetes mellitus without mention of complication, not stated as uncontrolled     1982     Past Surgical History:   Procedure Laterality Date    CYST REMOVAL      rt foot, ganglion  EYE SURGERY      has had laser to both eyes in office    REFRACTIVE SURGERY  2016    has never had lasik eye surgery    WISDOM TOOTH EXTRACTION  2005     Social History   Substance Use Topics    Smoking status: Former Smoker     Packs/day: 0.25     Years: 15.00     Quit date: 1/26/2002    Smokeless tobacco: Current User     Types: Chew    Alcohol use 1.8 oz/week     3 Cans of beer per week      Comment: daily         Vital Signs (Current) There were no vitals filed for this visit. Vital Signs Statistics (for past 48 hrs)     No Data Recorded  BP Readings from Last 3 Encounters:   07/16/18 134/72   05/14/18 118/62   03/08/18 (!) 149/84       BMI  Body mass index is 27.12 kg/m². CBC   Lab Results   Component Value Date    WBC 6.2 06/26/2015    RBC 5.52 06/26/2015    HGB 16.0 06/26/2015    HCT 47.0 06/26/2015    MCV 85.0 06/26/2015    RDW 14.0 06/26/2015     06/26/2015       CMP    Lab Results   Component Value Date     07/11/2018    K 5.0 07/11/2018     07/11/2018    CO2 28 07/11/2018    BUN 12 07/11/2018    CREATININE 0.7 07/11/2018    LABGLOM >90 07/11/2018    GLUCOSE 113 07/11/2018    PROT 7.1 06/17/2017    CALCIUM 9.3 07/11/2018    BILITOT 0.6 06/17/2017    ALKPHOS 69 06/17/2017    AST 18 06/17/2017    ALT 22 06/17/2017       BMP    Lab Results   Component Value Date     07/11/2018    K 5.0 07/11/2018     07/11/2018    CO2 28 07/11/2018    BUN 12 07/11/2018    CREATININE 0.7 07/11/2018    CALCIUM 9.3 07/11/2018    LABGLOM >90 07/11/2018    GLUCOSE 113 07/11/2018       POC Testing  No results for input(s): POCGLU, POCNA, POCK, POCCL, POCBUN, POCHEMO, POCHCT in the last 72 hours.     Coags  No results found for: PROTIME, INR, APTT    HCG (If Applicable) No results found for: PREGTESTUR, PREGSERUM, HCG, HCGQUANT     ABGs No results found for: PHART, PO2ART, SKJ9NCE, VEM9PGC, BEART, G7DATIXV     Type & Screen (If Applicable)  No results found for: [facility] Applicable):  No results found for: LABABO, LABRH    Anesthesia Evaluation   no history of anesthetic complications:   Airway: Mallampati: III     Neck ROM: full   Dental:          Pulmonary:   (+) sleep apnea: on noncompliant,      (-) recent URI and not a current smoker                           Cardiovascular:    (+) hypertension:,                ROS comment: -cp,syn,pnd,palp     Neuro/Psych:      (-) seizures and CVA            ROS comment: neuropathy GI/Hepatic/Renal:   (+) GERD: well controlled,           Endo/Other:    (+) DiabetesType I DM, , .                  ROS comment: A1C 6.3 Abdominal:           Vascular:                                        Anesthesia Plan      MAC     ASA 4     (Asa 4 dm)  Induction: intravenous.                           Tyrese Rainey MD   8/15/2018

## 2018-08-16 NOTE — OP NOTE
[address] [facility] [address]                                 OPERATIVE REPORT    PATIENT NAME: FIDEL AGARWAL                      :        1970  MED REC NO:   [id number]                             ROOM:  ACCOUNT NO:   [de-identified]                           ADMIT DATE: 08/15/2018  PROVIDER:     Ambrose Mckeon    DATE OF PROCEDURE:  08/15/2018    PREOPERATIVE DIAGNOSES:  1. Complex tractional rhegmatogenous retinal detachment, right eye. 2.  Proliferative diabetic retinopathy, right eye. 3.  Vitreous hemorrhage, right eye. 4.  Incipient cataract, right eye. POSTOPERATIVE DIAGNOSES:  1. Complex tractional rhegmatogenous retinal detachment, right eye. 2.  Proliferative diabetic retinopathy, right eye. 3.  Vitreous hemorrhage, right eye. 4.  Incipient cataract, right eye. PROCEDURES:  Pars plana vitrectomy with repair of complex retinal  detachment, right eye. SURGEON:  Ambrose Mckeon MD    ANESTHESIA:  Local with monitored anesthesia care. COMPLICATIONS:  None. INDICATIONS FOR PROCEDURE:  The patient is a pleasant 79-year-old man who  presented with a chief complaint of painless decreased vision in his right  eye. He was diagnosed with a complex diabetic tractional retinal  detachment with rhegmatogenous component in the right eye. The risks,  benefits, and alternatives to surgical repair were discussed. Risks  discussed included, but were not limited to, risk of infection, risk of  recurrent or persistent detachment, risk of glaucoma, risk of hemorrhage,  risk of cataract progression, risk of loss of vision, risk of loss of the  eye, risk of loss of life. Additionally, the guarded visual prognosis was  explained given the presence of underlying diabetic retinopathy,  involvement of the macula, and complex nature the detachment at the time of  presentation.   The patient indicated he understood the risks removed. Extrusion was then utilized to complete a  fluid-air exchange utilizing the patient's break to drain all subretinal  fluid without complications. Endolaser was introduced to treat the retinal  break 360 degrees as well as to treat the retina with panretinal  photocoagulation without complications. Any residual fluid was removed  followed by exchange of C3F8 gas. Trocars were removed. The wounds were  examined meticulously for leaks, none were noted. Eye was palpated and  noted to be within a normal pressure range. Ancef was instilled. Speculum  was removed. The eye was cleaned. Drops and ointments were instilled. The eye was patched and shielded. DISPOSITION:  The patient was transported to the PACU without  complications. He was given instructions, prescriptions of medications,  and a followup the next day with [facility].         Columba Carmichael    D: 08/15/2018 13:03:46       T: 08/15/2018 13:06:37     [email or web address]  Job#: [id number]     Doc#: [id number]    CC:

## 2018-08-21 RX ORDER — PANTOPRAZOLE SODIUM 40 MG/1
40 TABLET, DELAYED RELEASE ORAL DAILY
Qty: 90 TABLET | Refills: 0 | Status: SHIPPED | OUTPATIENT
Start: 2018-08-21 | End: 2018-11-27 | Stop reason: SDUPTHER

## 2018-08-27 ENCOUNTER — OFFICE VISIT (OUTPATIENT)
Dept: INTERNAL MEDICINE CLINIC | Age: 48
End: 2018-08-27
Payer: COMMERCIAL

## 2018-08-27 VITALS — HEIGHT: 72 IN | WEIGHT: 200.2 LBS | BODY MASS INDEX: 27.12 KG/M2

## 2018-08-27 DIAGNOSIS — E10.8 CONTROLLED DIABETES MELLITUS TYPE 1 WITH COMPLICATIONS (HCC): ICD-10-CM

## 2018-08-27 PROCEDURE — G0108 DIAB MANAGE TRN  PER INDIV: HCPCS | Performed by: NURSE PRACTITIONER

## 2018-08-27 NOTE — PATIENT INSTRUCTIONS
1. Keep checking blood sugars before meals, bedtime and as needed       Blood sugar  mg/dL before meals                 2hrs after a meal --under 160         Basal = impacting 3-5 hours after eating and wake up blood sugars         Bolus = impacting blood sugars within 2-3 hours after eating/ bolusing  2. Get exercise program back in place as ordered  3. Download your pump in 1 week and email [email or web address]  4. Any questions--call!   [phone]

## 2018-08-27 NOTE — PROGRESS NOTES
need for glucose checks. Follow up 3 months. DSME PLAN:   Discussed general issues about diabetes pathophysiology and management. Counseling at today's visit: basal vs bolus; carb counting; BG goals; treating low BS. 1. Keep checking blood sugars before meals, bedtime and as needed       Blood sugar  mg/dL before meals                 2hrs after a meal --under 160         Basal = impacting 3-5 hours after eating and wake up blood sugars         Bolus = impacting blood sugars within 2-3 hours after eating/ bolusing  2. Get exercise program back in place as ordered  3. Download your pump in 1 week and email [email or web address]  4. Any questions--call! [phone]    Meter download, medications, PMH and nursing assessment reviewed. Debjarrett Claude states He is willing to participate in this plan of care and verbalized understanding of all instructions provided. Teach back used to verify comprehension. Total time involved in direct patient education: 60 minutes.

## 2018-08-28 ENCOUNTER — OFFICE VISIT (OUTPATIENT)
Dept: FAMILY MEDICINE CLINIC | Age: 48
End: 2018-08-28
Payer: COMMERCIAL

## 2018-08-28 VITALS
RESPIRATION RATE: 16 BRPM | HEART RATE: 70 BPM | TEMPERATURE: 98.1 F | WEIGHT: 195 LBS | DIASTOLIC BLOOD PRESSURE: 62 MMHG | SYSTOLIC BLOOD PRESSURE: 102 MMHG | BODY MASS INDEX: 26.45 KG/M2

## 2018-08-28 DIAGNOSIS — F41.9 ANXIETY: Primary | ICD-10-CM

## 2018-08-28 PROCEDURE — 99213 OFFICE O/P EST LOW 20 MIN: CPT | Performed by: FAMILY MEDICINE

## 2018-08-28 RX ORDER — TRAZODONE HYDROCHLORIDE 50 MG/1
50 TABLET ORAL NIGHTLY
Qty: 30 TABLET | Refills: 2 | Status: SHIPPED | OUTPATIENT
Start: 2018-08-28 | End: 2018-10-05 | Stop reason: SINTOL

## 2018-08-30 ASSESSMENT — ENCOUNTER SYMPTOMS
ABDOMINAL PAIN: 0
SHORTNESS OF BREATH: 0
EYE PAIN: 0
CONSTIPATION: 0
SINUS PRESSURE: 0
RHINORRHEA: 0
COUGH: 0
SORE THROAT: 0
NAUSEA: 0
ABDOMINAL DISTENTION: 0
DIARRHEA: 0
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
## 2018-09-18 ENCOUNTER — ANESTHESIA EVENT (OUTPATIENT)
Dept: OPERATING ROOM | Age: 48
End: 2018-09-18
Payer: COMMERCIAL

## 2018-09-19 ENCOUNTER — HOSPITAL ENCOUNTER (OUTPATIENT)
Age: 48
Setting detail: SURGERY ADMIT
Discharge: HOME HEALTH | End: 2018-09-19
Attending: OPHTHALMOLOGY | Admitting: OPHTHALMOLOGY
Payer: COMMERCIAL

## 2018-09-19 ENCOUNTER — ANESTHESIA (OUTPATIENT)
Dept: OPERATING ROOM | Age: 48
End: 2018-09-19
Payer: COMMERCIAL

## 2018-09-19 VITALS
HEIGHT: 72 IN | WEIGHT: 200 LBS | TEMPERATURE: 97 F | HEART RATE: 77 BPM | SYSTOLIC BLOOD PRESSURE: 155 MMHG | RESPIRATION RATE: 16 BRPM | DIASTOLIC BLOOD PRESSURE: 80 MMHG | OXYGEN SATURATION: 97 % | BODY MASS INDEX: 27.09 KG/M2

## 2018-09-19 VITALS — SYSTOLIC BLOOD PRESSURE: 123 MMHG | DIASTOLIC BLOOD PRESSURE: 66 MMHG | OXYGEN SATURATION: 99 % | TEMPERATURE: 96.7 F

## 2018-09-19 LAB
GFR NON-AFRICAN AMERICAN: >60 ML/MIN
GFR SERPL CREATININE-BSD FRML MDRD: >60 ML/MIN
GFR SERPL CREATININE-BSD FRML MDRD: NORMAL ML/MIN/{1.73_M2}
GLUCOSE BLD-MCNC: 162 MG/DL (ref 75–110)
GLUCOSE BLD-MCNC: 271 MG/DL (ref 75–110)
GLUCOSE BLD-MCNC: 298 MG/DL (ref 74–100)
POC CHLORIDE: 102 MMOL/L (ref 98–107)
POC CREATININE: 0.83 MG/DL (ref 0.51–1.19)
POC POTASSIUM: 4.2 MMOL/L (ref 3.5–4.5)
POC SODIUM: 140 MMOL/L (ref 138–146)

## 2018-09-19 PROCEDURE — 7100000001 HC PACU RECOVERY - ADDTL 15 MIN: Performed by: OPHTHALMOLOGY

## 2018-09-19 PROCEDURE — 6370000000 HC RX 637 (ALT 250 FOR IP): Performed by: OPHTHALMOLOGY

## 2018-09-19 PROCEDURE — 82565 ASSAY OF CREATININE: CPT

## 2018-09-19 PROCEDURE — 2720000010 HC SURG SUPPLY STERILE: Performed by: OPHTHALMOLOGY

## 2018-09-19 PROCEDURE — 84295 ASSAY OF SERUM SODIUM: CPT

## 2018-09-19 PROCEDURE — 2580000003 HC RX 258: Performed by: ANESTHESIOLOGY

## 2018-09-19 PROCEDURE — 82435 ASSAY OF BLOOD CHLORIDE: CPT

## 2018-09-19 PROCEDURE — 7100000040 HC SPAR PHASE II RECOVERY - FIRST 15 MIN: Performed by: OPHTHALMOLOGY

## 2018-09-19 PROCEDURE — 2580000003 HC RX 258: Performed by: OPHTHALMOLOGY

## 2018-09-19 PROCEDURE — 6360000002 HC RX W HCPCS: Performed by: NURSE ANESTHETIST, CERTIFIED REGISTERED

## 2018-09-19 PROCEDURE — 2580000003 HC RX 258: Performed by: NURSE ANESTHETIST, CERTIFIED REGISTERED

## 2018-09-19 PROCEDURE — 3600000004 HC SURGERY LEVEL 4 BASE: Performed by: OPHTHALMOLOGY

## 2018-09-19 PROCEDURE — 6370000000 HC RX 637 (ALT 250 FOR IP): Performed by: ANESTHESIOLOGY

## 2018-09-19 PROCEDURE — 3700000000 HC ANESTHESIA ATTENDED CARE: Performed by: OPHTHALMOLOGY

## 2018-09-19 PROCEDURE — 82947 ASSAY GLUCOSE BLOOD QUANT: CPT

## 2018-09-19 PROCEDURE — 6360000002 HC RX W HCPCS: Performed by: OPHTHALMOLOGY

## 2018-09-19 PROCEDURE — 3600000014 HC SURGERY LEVEL 4 ADDTL 15MIN: Performed by: OPHTHALMOLOGY

## 2018-09-19 PROCEDURE — 2780000010 HC IMPLANT OTHER: Performed by: OPHTHALMOLOGY

## 2018-09-19 PROCEDURE — 2709999900 HC NON-CHARGEABLE SUPPLY: Performed by: OPHTHALMOLOGY

## 2018-09-19 PROCEDURE — 2500000003 HC RX 250 WO HCPCS: Performed by: NURSE ANESTHETIST, CERTIFIED REGISTERED

## 2018-09-19 PROCEDURE — 2500000003 HC RX 250 WO HCPCS: Performed by: OPHTHALMOLOGY

## 2018-09-19 PROCEDURE — 7100000000 HC PACU RECOVERY - FIRST 15 MIN: Performed by: OPHTHALMOLOGY

## 2018-09-19 PROCEDURE — 84132 ASSAY OF SERUM POTASSIUM: CPT

## 2018-09-19 PROCEDURE — 7100000041 HC SPAR PHASE II RECOVERY - ADDTL 15 MIN: Performed by: OPHTHALMOLOGY

## 2018-09-19 PROCEDURE — 3700000001 HC ADD 15 MINUTES (ANESTHESIA): Performed by: OPHTHALMOLOGY

## 2018-09-19 PROCEDURE — C1814 RETINAL TAMP, SILICONE OIL: HCPCS | Performed by: OPHTHALMOLOGY

## 2018-09-19 DEVICE — STRIP SCLER W3.5XL125MM THK0.75MM SIL SLD STYL 41/S2970: Type: IMPLANTABLE DEVICE | Status: FUNCTIONAL

## 2018-09-19 DEVICE — SILIKON™ 1000 (PURIFIED POLYDIMETHYLSILOXANE) IS HIGHLY PURIFIED LONG CHAIN POLYDIMETHYLSILOXANE TRIMETHYLSILOXY TERMINATED SILICONE OIL. IT IS STERILE,NON-PYROGENIC, CLEAR, COLORLESS AND HAS A VISCOSITY OF 1000 CS. FOR USE AS A POST-OPERATIVE RETINAL TAMPONADE DURING VITREORETINAL SURGERY. SILIKON™ 1000IS COMPOSED OF SILICON, OXYGEN, CARBON AND HYDROGEN ATOMS. SILIKON™ 1000 IS IMMISCIBLE WITH AQUEOUS COMPONENTS AND IS RELATIVELY INERT MATERIAL WITHLITTLE BIOLOGICAL TOXICITY POTENTIAL.
Type: IMPLANTABLE DEVICE | Status: FUNCTIONAL
Brand: SILIKON™

## 2018-09-19 DEVICE — SLEEVE SCLER BCKL L30IN OD2.4IN ID1.5IN SIL STYL 72: Type: IMPLANTABLE DEVICE | Status: FUNCTIONAL

## 2018-09-19 RX ORDER — HYDROCODONE BITARTRATE AND ACETAMINOPHEN 5; 325 MG/1; MG/1
2 TABLET ORAL PRN
Status: COMPLETED | OUTPATIENT
Start: 2018-09-19 | End: 2018-09-19

## 2018-09-19 RX ORDER — LIDOCAINE HYDROCHLORIDE 20 MG/ML
INJECTION, SOLUTION INFILTRATION; PERINEURAL PRN
Status: DISCONTINUED | OUTPATIENT
Start: 2018-09-19 | End: 2018-09-19 | Stop reason: HOSPADM

## 2018-09-19 RX ORDER — ROCURONIUM BROMIDE 10 MG/ML
INJECTION, SOLUTION INTRAVENOUS PRN
Status: DISCONTINUED | OUTPATIENT
Start: 2018-09-19 | End: 2018-09-19 | Stop reason: SDUPTHER

## 2018-09-19 RX ORDER — PROPOFOL 10 MG/ML
INJECTION, EMULSION INTRAVENOUS PRN
Status: DISCONTINUED | OUTPATIENT
Start: 2018-09-19 | End: 2018-09-19 | Stop reason: SDUPTHER

## 2018-09-19 RX ORDER — PHENYLEPHRINE HYDROCHLORIDE 100 MG/ML
1 SOLUTION/ DROPS OPHTHALMIC EVERY 5 MIN PRN
Status: COMPLETED | OUTPATIENT
Start: 2018-09-19 | End: 2018-09-19

## 2018-09-19 RX ORDER — TIMOLOL MALEATE 5 MG/ML
SOLUTION/ DROPS OPHTHALMIC PRN
Status: DISCONTINUED | OUTPATIENT
Start: 2018-09-19 | End: 2018-09-19 | Stop reason: HOSPADM

## 2018-09-19 RX ORDER — FENTANYL CITRATE 50 UG/ML
INJECTION, SOLUTION INTRAMUSCULAR; INTRAVENOUS PRN
Status: DISCONTINUED | OUTPATIENT
Start: 2018-09-19 | End: 2018-09-19 | Stop reason: SDUPTHER

## 2018-09-19 RX ORDER — SODIUM CHLORIDE 0.9 % (FLUSH) 0.9 %
10 SYRINGE (ML) INJECTION EVERY 12 HOURS SCHEDULED
Status: DISCONTINUED | OUTPATIENT
Start: 2018-09-19 | End: 2018-09-19 | Stop reason: HOSPADM

## 2018-09-19 RX ORDER — DEXTROSE MONOHYDRATE 25 G/50ML
INJECTION, SOLUTION INTRAVENOUS PRN
Status: DISCONTINUED | OUTPATIENT
Start: 2018-09-19 | End: 2018-09-19 | Stop reason: HOSPADM

## 2018-09-19 RX ORDER — SODIUM CHLORIDE, SODIUM LACTATE, POTASSIUM CHLORIDE, CALCIUM CHLORIDE 600; 310; 30; 20 MG/100ML; MG/100ML; MG/100ML; MG/100ML
INJECTION, SOLUTION INTRAVENOUS CONTINUOUS
Status: DISCONTINUED | OUTPATIENT
Start: 2018-09-19 | End: 2018-09-19 | Stop reason: HOSPADM

## 2018-09-19 RX ORDER — ONDANSETRON 2 MG/ML
INJECTION INTRAMUSCULAR; INTRAVENOUS PRN
Status: DISCONTINUED | OUTPATIENT
Start: 2018-09-19 | End: 2018-09-19 | Stop reason: SDUPTHER

## 2018-09-19 RX ORDER — OFLOXACIN 3 MG/ML
1 SOLUTION/ DROPS OPHTHALMIC EVERY 5 MIN PRN
Status: COMPLETED | OUTPATIENT
Start: 2018-09-19 | End: 2018-09-19

## 2018-09-19 RX ORDER — SODIUM CHLORIDE, SODIUM LACTATE, POTASSIUM CHLORIDE, CALCIUM CHLORIDE 600; 310; 30; 20 MG/100ML; MG/100ML; MG/100ML; MG/100ML
INJECTION, SOLUTION INTRAVENOUS CONTINUOUS PRN
Status: DISCONTINUED | OUTPATIENT
Start: 2018-09-19 | End: 2018-09-19 | Stop reason: SDUPTHER

## 2018-09-19 RX ORDER — HYDROCODONE BITARTRATE AND ACETAMINOPHEN 5; 325 MG/1; MG/1
1 TABLET ORAL PRN
Status: COMPLETED | OUTPATIENT
Start: 2018-09-19 | End: 2018-09-19

## 2018-09-19 RX ORDER — BALANCED SALT SOLUTION ENRICHED WITH BICARBONATE, DEXTROSE, AND GLUTATHIONE
KIT INTRAOCULAR PRN
Status: DISCONTINUED | OUTPATIENT
Start: 2018-09-19 | End: 2018-09-19 | Stop reason: HOSPADM

## 2018-09-19 RX ORDER — LIDOCAINE HYDROCHLORIDE 10 MG/ML
INJECTION, SOLUTION EPIDURAL; INFILTRATION; INTRACAUDAL; PERINEURAL PRN
Status: DISCONTINUED | OUTPATIENT
Start: 2018-09-19 | End: 2018-09-19 | Stop reason: SDUPTHER

## 2018-09-19 RX ORDER — GENTAMICIN SULFATE 40 MG/ML
INJECTION, SOLUTION INTRAMUSCULAR; INTRAVENOUS PRN
Status: DISCONTINUED | OUTPATIENT
Start: 2018-09-19 | End: 2018-09-19 | Stop reason: HOSPADM

## 2018-09-19 RX ORDER — BUPIVACAINE HYDROCHLORIDE 7.5 MG/ML
INJECTION, SOLUTION EPIDURAL; RETROBULBAR PRN
Status: DISCONTINUED | OUTPATIENT
Start: 2018-09-19 | End: 2018-09-19 | Stop reason: HOSPADM

## 2018-09-19 RX ORDER — CYCLOPENTOLATE HYDROCHLORIDE 10 MG/ML
1 SOLUTION/ DROPS OPHTHALMIC EVERY 5 MIN PRN
Status: COMPLETED | OUTPATIENT
Start: 2018-09-19 | End: 2018-09-19

## 2018-09-19 RX ORDER — SODIUM CHLORIDE 0.9 % (FLUSH) 0.9 %
10 SYRINGE (ML) INJECTION PRN
Status: DISCONTINUED | OUTPATIENT
Start: 2018-09-19 | End: 2018-09-19 | Stop reason: HOSPADM

## 2018-09-19 RX ORDER — FENTANYL CITRATE 50 UG/ML
25 INJECTION, SOLUTION INTRAMUSCULAR; INTRAVENOUS EVERY 5 MIN PRN
Status: DISCONTINUED | OUTPATIENT
Start: 2018-09-19 | End: 2018-09-19 | Stop reason: HOSPADM

## 2018-09-19 RX ORDER — LIDOCAINE HYDROCHLORIDE 10 MG/ML
1 INJECTION, SOLUTION EPIDURAL; INFILTRATION; INTRACAUDAL; PERINEURAL
Status: DISCONTINUED | OUTPATIENT
Start: 2018-09-19 | End: 2018-09-19 | Stop reason: HOSPADM

## 2018-09-19 RX ORDER — ERYTHROMYCIN 5 MG/G
OINTMENT OPHTHALMIC PRN
Status: DISCONTINUED | OUTPATIENT
Start: 2018-09-19 | End: 2018-09-19 | Stop reason: HOSPADM

## 2018-09-19 RX ORDER — ATROPINE SULFATE 10 MG/ML
1 SOLUTION/ DROPS OPHTHALMIC
Status: COMPLETED | OUTPATIENT
Start: 2018-09-19 | End: 2018-09-19

## 2018-09-19 RX ORDER — CEFAZOLIN SODIUM 500 MG/2.2ML
INJECTION, POWDER, FOR SOLUTION INTRAMUSCULAR; INTRAVENOUS PRN
Status: DISCONTINUED | OUTPATIENT
Start: 2018-09-19 | End: 2018-09-19 | Stop reason: HOSPADM

## 2018-09-19 RX ORDER — GLYCOPYRROLATE 1 MG/5 ML
SYRINGE (ML) INTRAVENOUS PRN
Status: DISCONTINUED | OUTPATIENT
Start: 2018-09-19 | End: 2018-09-19 | Stop reason: SDUPTHER

## 2018-09-19 RX ORDER — SODIUM CHLORIDE 9 MG/ML
INJECTION, SOLUTION INTRAVENOUS CONTINUOUS
Status: DISCONTINUED | OUTPATIENT
Start: 2018-09-19 | End: 2018-09-19 | Stop reason: HOSPADM

## 2018-09-19 RX ADMIN — GENTAMICIN, PREDNISOLONE ACETATE 1 DROP: 3; 10 SUSPENSION/ DROPS OPHTHALMIC at 08:21

## 2018-09-19 RX ADMIN — ONDANSETRON 4 MG: 2 INJECTION, SOLUTION INTRAMUSCULAR; INTRAVENOUS at 11:01

## 2018-09-19 RX ADMIN — PHENYLEPHRINE HYDROCHLORIDE 1 DROP: 100 SOLUTION/ DROPS OPHTHALMIC at 08:21

## 2018-09-19 RX ADMIN — NEOSTIGMINE METHYLSULFATE 3 MG: 1 INJECTION, SOLUTION INTRAMUSCULAR; INTRAVENOUS; SUBCUTANEOUS at 10:59

## 2018-09-19 RX ADMIN — OFLOXACIN 1 DROP: 3 SOLUTION OPHTHALMIC at 08:21

## 2018-09-19 RX ADMIN — Medication 0.4 MG: at 10:59

## 2018-09-19 RX ADMIN — OFLOXACIN 1 DROP: 3 SOLUTION OPHTHALMIC at 07:56

## 2018-09-19 RX ADMIN — LIDOCAINE HYDROCHLORIDE 50 MG: 10 INJECTION, SOLUTION EPIDURAL; INFILTRATION; INTRACAUDAL; PERINEURAL at 09:38

## 2018-09-19 RX ADMIN — CYCLOPENTOLATE HYDROCHLORIDE 1 DROP: 10 SOLUTION/ DROPS OPHTHALMIC at 07:56

## 2018-09-19 RX ADMIN — Medication 0.2 MG: at 09:48

## 2018-09-19 RX ADMIN — PROPOFOL 200 MG: 10 INJECTION, EMULSION INTRAVENOUS at 09:38

## 2018-09-19 RX ADMIN — PROPOFOL 50 MG: 10 INJECTION, EMULSION INTRAVENOUS at 10:50

## 2018-09-19 RX ADMIN — FENTANYL CITRATE 25 MCG: 50 INJECTION INTRAMUSCULAR; INTRAVENOUS at 10:55

## 2018-09-19 RX ADMIN — SODIUM CHLORIDE, POTASSIUM CHLORIDE, SODIUM LACTATE AND CALCIUM CHLORIDE: 600; 310; 30; 20 INJECTION, SOLUTION INTRAVENOUS at 09:38

## 2018-09-19 RX ADMIN — OFLOXACIN 1 DROP: 3 SOLUTION OPHTHALMIC at 08:02

## 2018-09-19 RX ADMIN — CYCLOPENTOLATE HYDROCHLORIDE 1 DROP: 10 SOLUTION/ DROPS OPHTHALMIC at 08:21

## 2018-09-19 RX ADMIN — HYDROCODONE BITARTRATE AND ACETAMINOPHEN 2 TABLET: 5; 325 TABLET ORAL at 12:13

## 2018-09-19 RX ADMIN — SODIUM CHLORIDE, POTASSIUM CHLORIDE, SODIUM LACTATE AND CALCIUM CHLORIDE: 600; 310; 30; 20 INJECTION, SOLUTION INTRAVENOUS at 07:43

## 2018-09-19 RX ADMIN — ROCURONIUM BROMIDE 25 MG: 10 INJECTION INTRAVENOUS at 09:38

## 2018-09-19 RX ADMIN — FENTANYL CITRATE 50 MCG: 50 INJECTION INTRAMUSCULAR; INTRAVENOUS at 09:38

## 2018-09-19 RX ADMIN — PHENYLEPHRINE HYDROCHLORIDE 1 DROP: 100 SOLUTION/ DROPS OPHTHALMIC at 07:56

## 2018-09-19 RX ADMIN — CYCLOPENTOLATE HYDROCHLORIDE 1 DROP: 10 SOLUTION/ DROPS OPHTHALMIC at 08:02

## 2018-09-19 RX ADMIN — PHENYLEPHRINE HYDROCHLORIDE 1 DROP: 100 SOLUTION/ DROPS OPHTHALMIC at 08:02

## 2018-09-19 RX ADMIN — PROPOFOL 30 MG: 10 INJECTION, EMULSION INTRAVENOUS at 10:52

## 2018-09-19 RX ADMIN — ATROPINE SULFATE 1 DROP: 10 SOLUTION/ DROPS OPHTHALMIC at 08:21

## 2018-09-19 RX ADMIN — FENTANYL CITRATE 50 MCG: 50 INJECTION INTRAMUSCULAR; INTRAVENOUS at 09:55

## 2018-09-19 ASSESSMENT — PULMONARY FUNCTION TESTS
PIF_VALUE: 15
PIF_VALUE: 17
PIF_VALUE: 17
PIF_VALUE: 13
PIF_VALUE: 19
PIF_VALUE: 20
PIF_VALUE: 16
PIF_VALUE: 18
PIF_VALUE: 16
PIF_VALUE: 15
PIF_VALUE: 16
PIF_VALUE: 1
PIF_VALUE: 4
PIF_VALUE: 17
PIF_VALUE: 16
PIF_VALUE: 21
PIF_VALUE: 18
PIF_VALUE: 16
PIF_VALUE: 15
PIF_VALUE: 17
PIF_VALUE: 16
PIF_VALUE: 15
PIF_VALUE: 16
PIF_VALUE: 2
PIF_VALUE: 17
PIF_VALUE: 16
PIF_VALUE: 18
PIF_VALUE: 16
PIF_VALUE: 16
PIF_VALUE: 17
PIF_VALUE: 6
PIF_VALUE: 1
PIF_VALUE: 1
PIF_VALUE: 15
PIF_VALUE: 16
PIF_VALUE: 17
PIF_VALUE: 18
PIF_VALUE: 30
PIF_VALUE: 18
PIF_VALUE: 21
PIF_VALUE: 16
PIF_VALUE: 26
PIF_VALUE: 16
PIF_VALUE: 1
PIF_VALUE: 16
PIF_VALUE: 16
PIF_VALUE: 15
PIF_VALUE: 2
PIF_VALUE: 27
PIF_VALUE: 17
PIF_VALUE: 16
PIF_VALUE: 17
PIF_VALUE: 16
PIF_VALUE: 16
PIF_VALUE: 19
PIF_VALUE: 17
PIF_VALUE: 21
PIF_VALUE: 6
PIF_VALUE: 16
PIF_VALUE: 16
PIF_VALUE: 15
PIF_VALUE: 21
PIF_VALUE: 16
PIF_VALUE: 18
PIF_VALUE: 18
PIF_VALUE: 16
PIF_VALUE: 30
PIF_VALUE: 17
PIF_VALUE: 18
PIF_VALUE: 17
PIF_VALUE: 18
PIF_VALUE: 18
PIF_VALUE: 19
PIF_VALUE: 1
PIF_VALUE: 30
PIF_VALUE: 16
PIF_VALUE: 18
PIF_VALUE: 17
PIF_VALUE: 9
PIF_VALUE: 18
PIF_VALUE: 15
PIF_VALUE: 7
PIF_VALUE: 17
PIF_VALUE: 3
PIF_VALUE: 16
PIF_VALUE: 15
PIF_VALUE: 21
PIF_VALUE: 23
PIF_VALUE: 17
PIF_VALUE: 6
PIF_VALUE: 16
PIF_VALUE: 18
PIF_VALUE: 15
PIF_VALUE: 16
PIF_VALUE: 0

## 2018-09-19 ASSESSMENT — LIFESTYLE VARIABLES: SMOKING_STATUS: 0

## 2018-09-19 ASSESSMENT — PAIN SCALES - GENERAL
PAINLEVEL_OUTOF10: 0
PAINLEVEL_OUTOF10: 3
PAINLEVEL_OUTOF10: 0
PAINLEVEL_OUTOF10: 5

## 2018-09-19 ASSESSMENT — PAIN - FUNCTIONAL ASSESSMENT: PAIN_FUNCTIONAL_ASSESSMENT: 0-10

## 2018-09-19 NOTE — ANESTHESIA PRE PROCEDURE
Component Value Date     07/11/2018    K 5.0 07/11/2018     07/11/2018    CO2 28 07/11/2018    BUN 12 07/11/2018    CREATININE 0.7 07/11/2018    LABGLOM >90 07/11/2018    GLUCOSE 113 07/11/2018    PROT 7.1 06/17/2017    CALCIUM 9.3 07/11/2018    BILITOT 0.6 06/17/2017    ALKPHOS 69 06/17/2017    AST 18 06/17/2017    ALT 22 06/17/2017       BMP    Lab Results   Component Value Date     07/11/2018    K 5.0 07/11/2018     07/11/2018    CO2 28 07/11/2018    BUN 12 07/11/2018    CREATININE 0.7 07/11/2018    CALCIUM 9.3 07/11/2018    LABGLOM >90 07/11/2018    GLUCOSE 113 07/11/2018       POC Testing  No results for input(s): POCGLU, POCNA, POCK, POCCL, POCBUN, POCHEMO, POCHCT in the last 72 hours. Coags  No results found for: PROTIME, INR, APTT    HCG (If Applicable) No results found for: PREGTESTUR, PREGSERUM, HCG, HCGQUANT     ABGs No results found for: PHART, PO2ART, IWC2DQM, CCU3LHG, BEART, M2VCMJWD     Type & Screen (If Applicable)  No results found for: LABABO, [address]    Radiology (If Applicable)    Cardiac Testing (If Applicable)     EKG (If Applicable) nl          Medications prior to admission:   Prior to Admission medications    Medication Sig Start Date End Date Taking? Authorizing Provider   traZODone (DESYREL) 50 MG tablet Take 1 tablet by mouth nightly 8/28/18   Kaylee Canela MD   pantoprazole (PROTONIX) 40 MG tablet Take 1 tablet by mouth daily 8/21/18   Kaylee Canela MD   LUTEIN PO Take 1 tablet by mouth nightly as needed 5 mg     Historical Provider, MD   quinapril (ACCUPRIL) 10 MG tablet TAKE 1 TABLET BY MOUTH DAILY  Patient taking differently: Take 10 mg by mouth nightly TAKE 1 TABLET BY MOUTH DAILY 7/16/18   Sherin Roland, APRN - CNP   tadalafil (CIALIS) 20 MG tablet Use as directed, not more than 4 tablets/week 7/16/18   Selma Roland, APRN - CNP   insulin aspart (NOVOLOG) 100 UNIT/ML injection vial PATIENT IS USING A MAX OF 80 UNITS PER DAY.   Patient  VITRECTOMY Right 08/15/2018    Dr Mercie Phalen EXTRACTION  2005       Social History:    Social History   Substance Use Topics    Smoking status: Former Smoker     Packs/day: 0.25     Years: 15.00     Quit date: 1/26/2002    Smokeless tobacco: Current User     Types: Chew    Alcohol use 1.8 oz/week     3 Cans of beer per week      Comment: daily                                Ready to quit: Not Answered  Counseling given: Not Answered      Vital Signs (Current): There were no vitals filed for this visit. BP Readings from Last 3 Encounters:   08/28/18 102/62   08/15/18 127/78   08/15/18 127/69       NPO Status:                                                                                 BMI:   Wt Readings from Last 3 Encounters:   09/19/18 200 lb (90.7 kg)   08/28/18 195 lb (88.5 kg)   08/27/18 200 lb 3.2 oz (90.8 kg)     There is no height or weight on file to calculate BMI.    CBC:   Lab Results   Component Value Date    WBC 6.2 06/26/2015    RBC 5.52 06/26/2015    HGB 16.0 06/26/2015    HCT 47.0 06/26/2015    MCV 85.0 06/26/2015    RDW 14.0 06/26/2015     06/26/2015       CMP:   Lab Results   Component Value Date     07/11/2018    K 5.0 07/11/2018     07/11/2018    CO2 28 07/11/2018    BUN 12 07/11/2018    CREATININE 0.7 07/11/2018    LABGLOM >90 07/11/2018    GLUCOSE 113 07/11/2018    PROT 7.1 06/17/2017    CALCIUM 9.3 07/11/2018    BILITOT 0.6 06/17/2017    ALKPHOS 69 06/17/2017    AST 18 06/17/2017    ALT 22 06/17/2017       POC Tests: No results for input(s): POCGLU, POCNA, POCK, POCCL, POCBUN, POCHEMO, POCHCT in the last 72 hours.     Coags: No results found for: PROTIME, INR, APTT    HCG (If Applicable): No results found for: PREGTESTUR, PREGSERUM, HCG, HCGQUANT     ABGs: No results found for: PHART, PO2ART, NPQ2XVN, VWU3VHS, BEART, Z4SNBGEE     Type & Screen (If Applicable):  No results found for: LABABO,

## 2018-09-19 NOTE — H&P
education: N/A     Occupational History    Not on file. Social History Main Topics    Smoking status: Former Smoker     Packs/day: 0.25     Years: 15.00     Quit date: 1/26/2002    Smokeless tobacco: Current User     Types: Chew    Alcohol use 1.8 oz/week     3 Cans of beer per week      Comment: daily    Drug use: No    Sexual activity: Not on file     Other Topics Concern    Not on file     Social History Narrative    No narrative on file        Service:No              OBJECTIVE:   VITALS:  height is 6' (1.829 m) and weight is 200 lb (90.7 kg). CONSTITUTIONAL: Alert and oriented times 3, no acute distress and cooperative to examination. friendly and pleasant     SKIN: rash No    HEENT: Head is normocephalic, atraumatic. EOMI,      Oral air way :slightly narrow Yes    NECK: neck supple, no lymphadenopathy noted, trachea midline and straight       2+ carotid, no bruit    LUNGS: Chest expands equally bilaterally upon respiration, no accessory muscle used. Ausculation reveals no adventitious breath sounds. CARDIOVASCULAR: \"Heart sounds are normal.  Regular rate and rhythm without murmur,    ABDOMEN: Bowel sounds are present in all four quadrants      GENATALIA:Deferred. NEUROLOGIC: \"CN II-XII are grossly intact. EXTREMITIES: Pitting edema:  No,  Varicose veins: No     Dorsal pedal/posterior tibial pulses palpable: Yes         Strength:  Normal       Patient Active Problem List   Diagnosis    Hyperlipidemia    Anxiety    External otitis    Diabetes type 1, controlled ([address]    Retinal detachment, tractional, right               IMPRESSIONS:   1. Hx of DM retinopathy , hx of right eye retinal detachment   2.  does not have any pertinent problems on file.     [facility]  Electronically signed 9/19/2018 at 7:28 AM       Scheduled for: right eye surgery

## 2018-09-19 NOTE — BRIEF OP NOTE
Brief Postoperative Note  ______________________________________________________________    Patient: Catherine Del Rio  YOB: 1970  MRN: [id number]  Date of Procedure: 9/19/2018    Pre-Op Diagnosis: RIGHT EYE RECURRENT TRACTION RETINAL DETACHMENT     Post-Op Diagnosis: Same       Procedure(s):  VITRECTOMY 23 GAUGE WITH SCLERAL BUCKLE REPAIR OF COMPLEX RETINAL DETACHMENT RIGHT EYE    Anesthesia: GETA    Surgeon(s):  Armida Plasencia MD    Estimated Blood Loss: MIN    Complications: NONE    Implants:  Paolo Ribeiro MD  Date: 9/19/2018  Time: 7:53 AM

## 2018-09-20 LAB — GLUCOSE BLD-MCNC: 174 MG/DL (ref 75–110)

## 2018-09-20 NOTE — OP NOTE
the inferior and temporal retina. These were  utilized when conducting fluid-air exchange to reattach the retina. Endolaser was then introduced, used to treat 360 degrees around the breaks  and along the crest of the scleral buckle. Any residual fluid was removed  followed by exchange with silicone oil. The eye was palpated and was noted  to be with adequate pressure. The trocars were removed. The wounds were  closed with interrupted 7-0 Vicryl suture. The surface of the eye was  copiously irrigated, and the buckling element was irrigated with gentamicin  solution. 2-0 silk ties were removed. The conjunctiva and Tenon's were  reapproximated and closed with 6-0 plain gut suture. Ancef was  administered. The speculum was removed. The eye was cleaned. Drops and  ointment were instilled. The eye was patched and shielded. DISPOSITION:  The patient was extubated per the anesthesia care team, was  transported to the PACU without complications. He was given instructions,  prescription of medications, followup the next day with [facility].         Montse Gongora    D: 09/19/2018 11:05:20       T: 09/19/2018 11:08:06     [email or web address]  Job#: [id number]     Doc#: [id number]    CC:

## 2018-09-26 ENCOUNTER — HOSPITAL ENCOUNTER (OUTPATIENT)
Age: 48
Discharge: HOME | End: 2018-09-26
Payer: COMMERCIAL

## 2018-09-26 LAB
ALBUMIN SERPL-MCNC: 4.7 G/DL (ref 3.5–5.1)
ALP BLD-CCNC: 73 U/L (ref 38–126)
ALT SERPL-CCNC: 13 U/L (ref 11–66)
ANION GAP SERPL CALCULATED.3IONS-SCNC: 12 MEQ/L (ref 8–16)
AST SERPL-CCNC: 16 U/L (ref 5–40)
AVERAGE GLUCOSE: 126 MG/DL (ref 70–126)
BILIRUB SERPL-MCNC: 0.9 MG/DL (ref 0.3–1.2)
BUN BLDV-MCNC: 11 MG/DL (ref 7–22)
CALCIUM SERPL-MCNC: 9.4 MG/DL (ref 8.5–10.5)
CHLORIDE BLD-SCNC: 103 MEQ/L (ref 98–111)
CHOLESTEROL, TOTAL: 144 MG/DL (ref 100–199)
CO2: 26 MEQ/L (ref 23–33)
CREAT SERPL-MCNC: 0.7 MG/DL (ref 0.4–1.2)
CREATININE, URINE: 87.9 MG/DL
GFR SERPL CREATININE-BSD FRML MDRD: > 90 ML/MIN/1.73M2
GLUCOSE BLD-MCNC: 77 MG/DL (ref 70–108)
HBA1C MFR BLD: 6.2 % (ref 4.4–6.4)
HDLC SERPL-MCNC: 47 MG/DL
LDL CHOLESTEROL CALCULATED: 85 MG/DL
MICROALBUMIN UR-MCNC: 1.29 MG/DL
MICROALBUMIN/CREAT UR-RTO: 15 MG/G (ref 0–30)
POTASSIUM SERPL-SCNC: 4.4 MEQ/L (ref 3.5–5.2)
SODIUM BLD-SCNC: 141 MEQ/L (ref 135–145)
TOTAL PROTEIN: 7.5 G/DL (ref 6.1–8)
TRIGL SERPL-MCNC: 58 MG/DL (ref 0–199)

## 2018-09-26 PROCEDURE — 83036 HEMOGLOBIN GLYCOSYLATED A1C: CPT

## 2018-09-26 PROCEDURE — 82043 UR ALBUMIN QUANTITATIVE: CPT

## 2018-09-26 PROCEDURE — 36415 COLL VENOUS BLD VENIPUNCTURE: CPT

## 2018-09-26 PROCEDURE — 80061 LIPID PANEL: CPT

## 2018-09-26 PROCEDURE — 80053 COMPREHEN METABOLIC PANEL: CPT

## 2018-10-05 ENCOUNTER — OFFICE VISIT (OUTPATIENT)
Dept: FAMILY MEDICINE CLINIC | Age: 48
End: 2018-10-05
Payer: COMMERCIAL

## 2018-10-05 VITALS
BODY MASS INDEX: 26.14 KG/M2 | SYSTOLIC BLOOD PRESSURE: 132 MMHG | WEIGHT: 193 LBS | OXYGEN SATURATION: 98 % | RESPIRATION RATE: 16 BRPM | DIASTOLIC BLOOD PRESSURE: 68 MMHG | HEART RATE: 86 BPM | HEIGHT: 72 IN | TEMPERATURE: 97.7 F

## 2018-10-05 DIAGNOSIS — H33.20 RETINAL DETACHMENT, UNSPECIFIED LATERALITY: ICD-10-CM

## 2018-10-05 DIAGNOSIS — E10.8 CONTROLLED DIABETES MELLITUS TYPE 1 WITH COMPLICATIONS (HCC): Primary | ICD-10-CM

## 2018-10-05 PROCEDURE — 90471 IMMUNIZATION ADMIN: CPT | Performed by: FAMILY MEDICINE

## 2018-10-05 PROCEDURE — 99214 OFFICE O/P EST MOD 30 MIN: CPT | Performed by: FAMILY MEDICINE

## 2018-10-05 PROCEDURE — 90688 IIV4 VACCINE SPLT 0.5 ML IM: CPT | Performed by: FAMILY MEDICINE

## 2018-10-07 ASSESSMENT — ENCOUNTER SYMPTOMS
DIARRHEA: 0
SINUS PRESSURE: 0
RHINORRHEA: 0
COUGH: 0
ABDOMINAL PAIN: 0
EYE PAIN: 0
NAUSEA: 0
CONSTIPATION: 0
ABDOMINAL DISTENTION: 0
SHORTNESS OF BREATH: 0
SORE THROAT: 0

## 2018-10-16 ENCOUNTER — OFFICE VISIT (OUTPATIENT)
Dept: INTERNAL MEDICINE CLINIC | Age: 48
End: 2018-10-16
Payer: COMMERCIAL

## 2018-10-16 VITALS
OXYGEN SATURATION: 98 % | WEIGHT: 191 LBS | SYSTOLIC BLOOD PRESSURE: 130 MMHG | HEART RATE: 88 BPM | HEIGHT: 72 IN | DIASTOLIC BLOOD PRESSURE: 80 MMHG | BODY MASS INDEX: 25.87 KG/M2

## 2018-10-16 DIAGNOSIS — H33.41 RETINAL DETACHMENT, TRACTIONAL, RIGHT: ICD-10-CM

## 2018-10-16 DIAGNOSIS — E78.5 HYPERLIPIDEMIA, UNSPECIFIED HYPERLIPIDEMIA TYPE: ICD-10-CM

## 2018-10-16 DIAGNOSIS — E10.8 CONTROLLED DIABETES MELLITUS TYPE 1 WITH COMPLICATIONS (HCC): Primary | ICD-10-CM

## 2018-10-16 PROCEDURE — 99204 OFFICE O/P NEW MOD 45 MIN: CPT | Performed by: INTERNAL MEDICINE

## 2018-11-21 ENCOUNTER — TELEPHONE (OUTPATIENT)
Dept: INTERNAL MEDICINE CLINIC | Age: 48
End: 2018-11-21

## 2018-11-27 RX ORDER — PANTOPRAZOLE SODIUM 40 MG/1
TABLET, DELAYED RELEASE ORAL
Qty: 90 TABLET | Refills: 3 | Status: SHIPPED | OUTPATIENT
Start: 2018-11-27 | End: 2019-11-29 | Stop reason: SDUPTHER

## 2018-11-28 RX ORDER — ATORVASTATIN CALCIUM 10 MG/1
TABLET, FILM COATED ORAL
Qty: 90 TABLET | Refills: 1 | Status: SHIPPED | OUTPATIENT
Start: 2018-11-28 | End: 2019-07-29 | Stop reason: SDUPTHER

## 2019-01-02 ENCOUNTER — TELEPHONE (OUTPATIENT)
Dept: INTERNAL MEDICINE CLINIC | Age: 49
End: 2019-01-02

## 2019-01-29 ENCOUNTER — OFFICE VISIT (OUTPATIENT)
Dept: INTERNAL MEDICINE CLINIC | Age: 49
End: 2019-01-29
Payer: COMMERCIAL

## 2019-01-29 ENCOUNTER — TELEPHONE (OUTPATIENT)
Dept: FAMILY MEDICINE CLINIC | Age: 49
End: 2019-01-29

## 2019-01-29 VITALS
WEIGHT: 203 LBS | HEIGHT: 72 IN | BODY MASS INDEX: 27.5 KG/M2 | SYSTOLIC BLOOD PRESSURE: 122 MMHG | DIASTOLIC BLOOD PRESSURE: 64 MMHG

## 2019-01-29 DIAGNOSIS — E10.8 CONTROLLED DIABETES MELLITUS TYPE 1 WITH COMPLICATIONS (HCC): Primary | ICD-10-CM

## 2019-01-29 LAB — HBA1C MFR BLD: 6.2 % (ref 4.3–5.7)

## 2019-01-29 PROCEDURE — G0108 DIAB MANAGE TRN  PER INDIV: HCPCS | Performed by: INTERNAL MEDICINE

## 2019-01-29 PROCEDURE — 83036 HEMOGLOBIN GLYCOSYLATED A1C: CPT | Performed by: INTERNAL MEDICINE

## 2019-01-29 RX ORDER — FLASH GLUCOSE SCANNING READER
1 EACH MISCELLANEOUS ONCE
Qty: 1 DEVICE | Refills: 0 | Status: SHIPPED | OUTPATIENT
Start: 2019-01-29 | End: 2019-01-29

## 2019-01-29 RX ORDER — FLASH GLUCOSE SENSOR
KIT MISCELLANEOUS
Qty: 2 EACH | Refills: 5 | Status: SHIPPED | OUTPATIENT
Start: 2019-01-29 | End: 2020-08-10 | Stop reason: SDUPTHER

## 2019-02-04 ENCOUNTER — TELEPHONE (OUTPATIENT)
Dept: INTERNAL MEDICINE CLINIC | Age: 49
End: 2019-02-04

## 2019-02-06 ENCOUNTER — TELEPHONE (OUTPATIENT)
Dept: INTERNAL MEDICINE CLINIC | Age: 49
End: 2019-02-06

## 2019-02-07 RX ORDER — QUINAPRIL 10 MG/1
TABLET ORAL
Qty: 90 TABLET | Refills: 1 | Status: SHIPPED | OUTPATIENT
Start: 2019-02-07 | End: 2019-07-26 | Stop reason: SDUPTHER

## 2019-02-07 RX ORDER — ESCITALOPRAM OXALATE 20 MG/1
20 TABLET ORAL DAILY
Qty: 90 TABLET | Refills: 1 | Status: SHIPPED | OUTPATIENT
Start: 2019-02-07 | End: 2019-09-01 | Stop reason: SDUPTHER

## 2019-02-20 ENCOUNTER — TELEPHONE (OUTPATIENT)
Dept: INTERNAL MEDICINE CLINIC | Age: 49
End: 2019-02-20

## 2019-03-25 ENCOUNTER — OFFICE VISIT (OUTPATIENT)
Dept: INTERNAL MEDICINE CLINIC | Age: 49
End: 2019-03-25
Payer: COMMERCIAL

## 2019-03-25 VITALS
DIASTOLIC BLOOD PRESSURE: 70 MMHG | BODY MASS INDEX: 27.01 KG/M2 | SYSTOLIC BLOOD PRESSURE: 120 MMHG | WEIGHT: 199.4 LBS | HEIGHT: 72 IN

## 2019-03-25 DIAGNOSIS — E10.8 CONTROLLED DIABETES MELLITUS TYPE 1 WITH COMPLICATIONS (HCC): ICD-10-CM

## 2019-03-25 PROCEDURE — G0108 DIAB MANAGE TRN  PER INDIV: HCPCS | Performed by: INTERNAL MEDICINE

## 2019-05-21 ENCOUNTER — TELEPHONE (OUTPATIENT)
Dept: INTERNAL MEDICINE CLINIC | Age: 49
End: 2019-05-21

## 2019-05-21 ENCOUNTER — OFFICE VISIT (OUTPATIENT)
Dept: INTERNAL MEDICINE CLINIC | Age: 49
End: 2019-05-21
Payer: COMMERCIAL

## 2019-05-21 VITALS
DIASTOLIC BLOOD PRESSURE: 78 MMHG | HEIGHT: 72 IN | HEART RATE: 80 BPM | BODY MASS INDEX: 26.34 KG/M2 | SYSTOLIC BLOOD PRESSURE: 138 MMHG | WEIGHT: 194.5 LBS

## 2019-05-21 DIAGNOSIS — E10.65 TYPE 1 DIABETES MELLITUS WITH HYPERGLYCEMIA (HCC): Primary | ICD-10-CM

## 2019-05-21 LAB — HBA1C MFR BLD: 6.3 % (ref 4.3–5.7)

## 2019-05-21 PROCEDURE — G0108 DIAB MANAGE TRN  PER INDIV: HCPCS | Performed by: INTERNAL MEDICINE

## 2019-05-21 PROCEDURE — 83036 HEMOGLOBIN GLYCOSYLATED A1C: CPT | Performed by: INTERNAL MEDICINE

## 2019-05-21 NOTE — PATIENT INSTRUCTIONS
1. Try to treat low blood sugars with 15-20 grams of carbohydrate  2. Consider decrease in 9pm pump setting. Basal 1.4 units per hr, carb ratio 7, correction 28 mg/dL                  12am basal rate 0.95 units per hr  3. Try to bolus 10 minutes before simple carbohydrate meal --cereal, noodles, rice, banana              Always try to bolus before you eat  Your meal.  If you add more carbs-give a second bolus  4.  Any questions, call
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
## 2019-05-21 NOTE — TELEPHONE ENCOUNTER
Present for insulin pump follow up. [place] is having low blood sugars 1-3am at least 3-4 days per week. Dr. Naina Awan,      Please consider the following insulin pump setting changes:            12am basal rate - decr 0.95 units            9pm basal rate- decr 1.4 units, sensitivitiy -decr 28 mg/dL,                                    Carb ratio - decr 7 grams  If you agree, please note and return. Thank  You.

## 2019-07-29 RX ORDER — FLASH GLUCOSE SENSOR
KIT MISCELLANEOUS
Qty: 6 EACH | Refills: 3 | Status: SHIPPED | OUTPATIENT
Start: 2019-07-29 | End: 2020-06-09

## 2019-07-29 RX ORDER — ATORVASTATIN CALCIUM 10 MG/1
TABLET, FILM COATED ORAL
Qty: 90 TABLET | Refills: 3 | Status: SHIPPED | OUTPATIENT
Start: 2019-07-29 | End: 2020-07-29

## 2019-07-29 RX ORDER — QUINAPRIL 10 MG/1
TABLET ORAL
Qty: 90 TABLET | Refills: 3 | Status: SHIPPED | OUTPATIENT
Start: 2019-07-29 | End: 2020-07-29

## 2019-08-20 ENCOUNTER — OFFICE VISIT (OUTPATIENT)
Dept: INTERNAL MEDICINE CLINIC | Age: 49
End: 2019-08-20
Payer: COMMERCIAL

## 2019-08-20 ENCOUNTER — TELEPHONE (OUTPATIENT)
Dept: INTERNAL MEDICINE CLINIC | Age: 49
End: 2019-08-20

## 2019-08-20 VITALS
BODY MASS INDEX: 26.07 KG/M2 | SYSTOLIC BLOOD PRESSURE: 138 MMHG | HEIGHT: 72 IN | DIASTOLIC BLOOD PRESSURE: 81 MMHG | WEIGHT: 192.5 LBS

## 2019-08-20 DIAGNOSIS — E10.65 TYPE 1 DIABETES MELLITUS WITH HYPERGLYCEMIA (HCC): Primary | ICD-10-CM

## 2019-08-20 LAB — HBA1C MFR BLD: 6.5 % (ref 4.3–5.7)

## 2019-08-20 PROCEDURE — G0108 DIAB MANAGE TRN  PER INDIV: HCPCS | Performed by: INTERNAL MEDICINE

## 2019-08-20 PROCEDURE — 83036 HEMOGLOBIN GLYCOSYLATED A1C: CPT | Performed by: INTERNAL MEDICINE

## 2019-08-20 NOTE — TELEPHONE ENCOUNTER
Present for Diabetes education visit. Ace Chen is struggling to clear lunch and dinner and keep his evening glucose levels in control. Corrections are not working well during the night. Dr. Rosendo Kitchen,    Please authorize the [facility] at [address] [place] [address] to teach/ assist Ace Chen to make the following pump setting changes: .         5am basal rate 1.45 units/ hr             9am carb ratio 12 grams carb             5pm basal rate 1.4 units/ hr.     If you agree, please note and return. Thank you.

## 2019-09-04 RX ORDER — ESCITALOPRAM OXALATE 20 MG/1
TABLET ORAL
Qty: 90 TABLET | Refills: 0 | Status: SHIPPED | OUTPATIENT
Start: 2019-09-04 | End: 2019-12-10 | Stop reason: SDUPTHER

## 2019-10-15 ENCOUNTER — OFFICE VISIT (OUTPATIENT)
Dept: INTERNAL MEDICINE CLINIC | Age: 49
End: 2019-10-15
Payer: COMMERCIAL

## 2019-10-15 VITALS
SYSTOLIC BLOOD PRESSURE: 125 MMHG | HEIGHT: 72 IN | HEART RATE: 86 BPM | BODY MASS INDEX: 26.07 KG/M2 | RESPIRATION RATE: 12 BRPM | DIASTOLIC BLOOD PRESSURE: 82 MMHG | OXYGEN SATURATION: 97 % | WEIGHT: 192.46 LBS

## 2019-10-15 DIAGNOSIS — E10.65 TYPE 1 DIABETES MELLITUS WITH HYPERGLYCEMIA (HCC): Primary | ICD-10-CM

## 2019-10-15 DIAGNOSIS — Z23 NEED FOR INFLUENZA VACCINATION: ICD-10-CM

## 2019-10-15 PROCEDURE — 90686 IIV4 VACC NO PRSV 0.5 ML IM: CPT | Performed by: NURSE PRACTITIONER

## 2019-10-15 PROCEDURE — 90471 IMMUNIZATION ADMIN: CPT | Performed by: NURSE PRACTITIONER

## 2019-10-15 PROCEDURE — 99214 OFFICE O/P EST MOD 30 MIN: CPT | Performed by: NURSE PRACTITIONER

## 2019-11-04 ENCOUNTER — HOSPITAL ENCOUNTER (OUTPATIENT)
Age: 49
Discharge: HOME | End: 2019-11-04
Payer: COMMERCIAL

## 2019-11-04 DIAGNOSIS — E10.65 TYPE 1 DIABETES MELLITUS WITH HYPERGLYCEMIA (HCC): ICD-10-CM

## 2019-11-04 LAB
ALBUMIN SERPL-MCNC: 4.6 G/DL (ref 3.5–5.1)
ALP BLD-CCNC: 80 U/L (ref 38–126)
ALT SERPL-CCNC: 41 U/L (ref 11–66)
ANION GAP SERPL CALCULATED.3IONS-SCNC: 12 MEQ/L (ref 8–16)
AST SERPL-CCNC: 29 U/L (ref 5–40)
BILIRUB SERPL-MCNC: 0.5 MG/DL (ref 0.3–1.2)
BUN BLDV-MCNC: 16 MG/DL (ref 7–22)
CALCIUM SERPL-MCNC: 9.7 MG/DL (ref 8.5–10.5)
CHLORIDE BLD-SCNC: 101 MEQ/L (ref 98–111)
CHOLESTEROL, TOTAL: 176 MG/DL (ref 100–199)
CO2: 28 MEQ/L (ref 23–33)
CREAT SERPL-MCNC: 0.8 MG/DL (ref 0.4–1.2)
CREATININE, URINE: 210.5 MG/DL
ERYTHROCYTE [DISTWIDTH] IN BLOOD BY AUTOMATED COUNT: 13.2 % (ref 11.5–14.5)
ERYTHROCYTE [DISTWIDTH] IN BLOOD BY AUTOMATED COUNT: 40.2 FL (ref 35–45)
GFR SERPL CREATININE-BSD FRML MDRD: > 90 ML/MIN/1.73M2
GLUCOSE BLD-MCNC: 110 MG/DL (ref 70–108)
HCT VFR BLD CALC: 46.3 % (ref 42–52)
HDLC SERPL-MCNC: 66 MG/DL
HEMOGLOBIN: 15.5 GM/DL (ref 14–18)
LDL CHOLESTEROL CALCULATED: 95 MG/DL
MCH RBC QN AUTO: 28.7 PG (ref 26–33)
MCHC RBC AUTO-ENTMCNC: 33.5 GM/DL (ref 32.2–35.5)
MCV RBC AUTO: 85.7 FL (ref 80–94)
MICROALBUMIN UR-MCNC: 2.43 MG/DL
MICROALBUMIN/CREAT UR-RTO: 12 MG/G (ref 0–30)
PLATELET # BLD: 274 THOU/MM3 (ref 130–400)
PMV BLD AUTO: 10.8 FL (ref 9.4–12.4)
POTASSIUM SERPL-SCNC: 5 MEQ/L (ref 3.5–5.2)
RBC # BLD: 5.4 MILL/MM3 (ref 4.7–6.1)
SODIUM BLD-SCNC: 141 MEQ/L (ref 135–145)
TOTAL PROTEIN: 7.5 G/DL (ref 6.1–8)
TRIGL SERPL-MCNC: 73 MG/DL (ref 0–199)
WBC # BLD: 6.5 THOU/MM3 (ref 4.8–10.8)

## 2019-11-04 PROCEDURE — 82043 UR ALBUMIN QUANTITATIVE: CPT

## 2019-11-04 PROCEDURE — 85027 COMPLETE CBC AUTOMATED: CPT

## 2019-11-04 PROCEDURE — 80053 COMPREHEN METABOLIC PANEL: CPT

## 2019-11-04 PROCEDURE — 36415 COLL VENOUS BLD VENIPUNCTURE: CPT

## 2019-11-04 PROCEDURE — 80061 LIPID PANEL: CPT

## 2019-11-10 ASSESSMENT — ENCOUNTER SYMPTOMS
CONSTIPATION: 0
COUGH: 0
BLOOD IN STOOL: 0
WHEEZING: 0
SHORTNESS OF BREATH: 0
RHINORRHEA: 0
SORE THROAT: 0
SINUS PAIN: 0
DIARRHEA: 0
EYE PAIN: 0
SINUS PRESSURE: 0
VOMITING: 0
ABDOMINAL DISTENTION: 0
TROUBLE SWALLOWING: 0
PHOTOPHOBIA: 0
ABDOMINAL PAIN: 0
NAUSEA: 0

## 2019-11-30 RX ORDER — PANTOPRAZOLE SODIUM 40 MG/1
TABLET, DELAYED RELEASE ORAL
Qty: 30 TABLET | Refills: 0 | Status: SHIPPED | OUTPATIENT
Start: 2019-11-30 | End: 2019-12-10 | Stop reason: SDUPTHER

## 2019-12-09 RX ORDER — ESCITALOPRAM OXALATE 20 MG/1
TABLET ORAL
Qty: 90 TABLET | Refills: 0 | OUTPATIENT
Start: 2019-12-09

## 2019-12-10 ENCOUNTER — OFFICE VISIT (OUTPATIENT)
Dept: FAMILY MEDICINE CLINIC | Age: 49
End: 2019-12-10
Payer: COMMERCIAL

## 2019-12-10 VITALS
HEART RATE: 86 BPM | DIASTOLIC BLOOD PRESSURE: 78 MMHG | WEIGHT: 203.6 LBS | BODY MASS INDEX: 27.58 KG/M2 | HEIGHT: 72 IN | SYSTOLIC BLOOD PRESSURE: 138 MMHG | RESPIRATION RATE: 16 BRPM | TEMPERATURE: 97.5 F

## 2019-12-10 DIAGNOSIS — E78.2 MIXED HYPERLIPIDEMIA: ICD-10-CM

## 2019-12-10 DIAGNOSIS — K21.9 GASTROESOPHAGEAL REFLUX DISEASE, ESOPHAGITIS PRESENCE NOT SPECIFIED: ICD-10-CM

## 2019-12-10 DIAGNOSIS — Z86.69 HISTORY OF RETINAL DETACHMENT: ICD-10-CM

## 2019-12-10 DIAGNOSIS — N52.9 ERECTILE DYSFUNCTION, UNSPECIFIED ERECTILE DYSFUNCTION TYPE: ICD-10-CM

## 2019-12-10 DIAGNOSIS — E10.69 TYPE 1 DIABETES MELLITUS WITH OTHER SPECIFIED COMPLICATION (HCC): Primary | ICD-10-CM

## 2019-12-10 DIAGNOSIS — I10 ESSENTIAL HYPERTENSION: ICD-10-CM

## 2019-12-10 LAB — HBA1C MFR BLD: 6.4 %

## 2019-12-10 PROCEDURE — 99214 OFFICE O/P EST MOD 30 MIN: CPT | Performed by: NURSE PRACTITIONER

## 2019-12-10 PROCEDURE — G0444 DEPRESSION SCREEN ANNUAL: HCPCS | Performed by: NURSE PRACTITIONER

## 2019-12-10 PROCEDURE — 83036 HEMOGLOBIN GLYCOSYLATED A1C: CPT | Performed by: NURSE PRACTITIONER

## 2019-12-10 PROCEDURE — 90471 IMMUNIZATION ADMIN: CPT | Performed by: NURSE PRACTITIONER

## 2019-12-10 PROCEDURE — 90670 PCV13 VACCINE IM: CPT | Performed by: NURSE PRACTITIONER

## 2019-12-10 RX ORDER — TADALAFIL 20 MG/1
TABLET ORAL
Qty: 40 TABLET | Refills: 1 | Status: SHIPPED | OUTPATIENT
Start: 2019-12-10 | End: 2021-06-08

## 2019-12-10 RX ORDER — ESCITALOPRAM OXALATE 20 MG/1
TABLET ORAL
Qty: 90 TABLET | Refills: 0 | Status: SHIPPED | OUTPATIENT
Start: 2019-12-10 | End: 2020-03-16

## 2019-12-10 RX ORDER — PANTOPRAZOLE SODIUM 40 MG/1
TABLET, DELAYED RELEASE ORAL
Qty: 90 TABLET | Refills: 1 | Status: SHIPPED | OUTPATIENT
Start: 2019-12-10 | End: 2020-07-31

## 2019-12-10 SDOH — ECONOMIC STABILITY: FOOD INSECURITY: WITHIN THE PAST 12 MONTHS, THE FOOD YOU BOUGHT JUST DIDN'T LAST AND YOU DIDN'T HAVE MONEY TO GET MORE.: NEVER TRUE

## 2019-12-10 SDOH — ECONOMIC STABILITY: TRANSPORTATION INSECURITY
IN THE PAST 12 MONTHS, HAS THE LACK OF TRANSPORTATION KEPT YOU FROM MEDICAL APPOINTMENTS OR FROM GETTING MEDICATIONS?: NO

## 2019-12-10 SDOH — ECONOMIC STABILITY: FOOD INSECURITY: WITHIN THE PAST 12 MONTHS, YOU WORRIED THAT YOUR FOOD WOULD RUN OUT BEFORE YOU GOT MONEY TO BUY MORE.: NEVER TRUE

## 2019-12-10 SDOH — ECONOMIC STABILITY: INCOME INSECURITY: HOW HARD IS IT FOR YOU TO PAY FOR THE VERY BASICS LIKE FOOD, HOUSING, MEDICAL CARE, AND HEATING?: NOT HARD AT ALL

## 2019-12-10 SDOH — ECONOMIC STABILITY: TRANSPORTATION INSECURITY
IN THE PAST 12 MONTHS, HAS LACK OF TRANSPORTATION KEPT YOU FROM MEETINGS, WORK, OR FROM GETTING THINGS NEEDED FOR DAILY LIVING?: NO

## 2019-12-10 ASSESSMENT — PATIENT HEALTH QUESTIONNAIRE - PHQ9
4. FEELING TIRED OR HAVING LITTLE ENERGY: 1
3. TROUBLE FALLING OR STAYING ASLEEP: 1
SUM OF ALL RESPONSES TO PHQ QUESTIONS 1-9: 5
9. THOUGHTS THAT YOU WOULD BE BETTER OFF DEAD, OR OF HURTING YOURSELF: 0
6. FEELING BAD ABOUT YOURSELF - OR THAT YOU ARE A FAILURE OR HAVE LET YOURSELF OR YOUR FAMILY DOWN: 0
SUM OF ALL RESPONSES TO PHQ QUESTIONS 1-9: 5
SUM OF ALL RESPONSES TO PHQ9 QUESTIONS 1 & 2: 3
7. TROUBLE CONCENTRATING ON THINGS, SUCH AS READING THE NEWSPAPER OR WATCHING TELEVISION: 0
8. MOVING OR SPEAKING SO SLOWLY THAT OTHER PEOPLE COULD HAVE NOTICED. OR THE OPPOSITE, BEING SO FIGETY OR RESTLESS THAT YOU HAVE BEEN MOVING AROUND A LOT MORE THAN USUAL: 0
1. LITTLE INTEREST OR PLEASURE IN DOING THINGS: 2
10. IF YOU CHECKED OFF ANY PROBLEMS, HOW DIFFICULT HAVE THESE PROBLEMS MADE IT FOR YOU TO DO YOUR WORK, TAKE CARE OF THINGS AT HOME, OR GET ALONG WITH OTHER PEOPLE: 1
2. FEELING DOWN, DEPRESSED OR HOPELESS: 1
5. POOR APPETITE OR OVEREATING: 0

## 2020-01-30 ENCOUNTER — OFFICE VISIT (OUTPATIENT)
Dept: FAMILY MEDICINE CLINIC | Age: 50
End: 2020-01-30
Payer: COMMERCIAL

## 2020-01-30 ENCOUNTER — OFFICE VISIT (OUTPATIENT)
Dept: INTERNAL MEDICINE CLINIC | Age: 50
End: 2020-01-30
Payer: COMMERCIAL

## 2020-01-30 VITALS
BODY MASS INDEX: 26.99 KG/M2 | DIASTOLIC BLOOD PRESSURE: 73 MMHG | WEIGHT: 199 LBS | SYSTOLIC BLOOD PRESSURE: 134 MMHG | HEART RATE: 81 BPM

## 2020-01-30 VITALS
WEIGHT: 200.8 LBS | TEMPERATURE: 97.7 F | SYSTOLIC BLOOD PRESSURE: 130 MMHG | HEART RATE: 68 BPM | RESPIRATION RATE: 16 BRPM | DIASTOLIC BLOOD PRESSURE: 68 MMHG | BODY MASS INDEX: 27.23 KG/M2

## 2020-01-30 PROCEDURE — 99213 OFFICE O/P EST LOW 20 MIN: CPT | Performed by: NURSE PRACTITIONER

## 2020-01-30 PROCEDURE — G0108 DIAB MANAGE TRN  PER INDIV: HCPCS | Performed by: INTERNAL MEDICINE

## 2020-01-30 RX ORDER — BUTALBITAL, ACETAMINOPHEN AND CAFFEINE 50; 325; 40 MG/1; MG/1; MG/1
1 TABLET ORAL EVERY 6 HOURS PRN
Qty: 60 TABLET | Refills: 0 | Status: SHIPPED | OUTPATIENT
Start: 2020-01-30 | End: 2020-10-15

## 2020-01-30 RX ORDER — CIPROFLOXACIN 500 MG/1
500 TABLET, FILM COATED ORAL 2 TIMES DAILY
Qty: 20 TABLET | Refills: 0 | Status: SHIPPED | OUTPATIENT
Start: 2020-01-30 | End: 2020-02-09

## 2020-01-30 RX ORDER — CETIRIZINE HYDROCHLORIDE 10 MG/1
10 TABLET ORAL DAILY
Qty: 30 TABLET | Refills: 0 | Status: SHIPPED | OUTPATIENT
Start: 2020-01-30 | End: 2020-02-24

## 2020-01-30 ASSESSMENT — PATIENT HEALTH QUESTIONNAIRE - PHQ9
2. FEELING DOWN, DEPRESSED OR HOPELESS: 0
SUM OF ALL RESPONSES TO PHQ9 QUESTIONS 1 & 2: 0
SUM OF ALL RESPONSES TO PHQ QUESTIONS 1-9: 0
1. LITTLE INTEREST OR PLEASURE IN DOING THINGS: 0
SUM OF ALL RESPONSES TO PHQ QUESTIONS 1-9: 0

## 2020-01-30 NOTE — PROGRESS NOTES
[facility]  [address] [place], [address]  [phone] (phone)  [phone] (fax)    Patient ID: Sheng Funez 1970  Referring Provider: Dr. Emilia Longoria     Patient's name and  were verified. Subjective:    He presents for His follow-up diabetic visit. He has type 1 diabetes mellitus. Home regimen includes: insulin He is compliant most of the time. Assessment:     Lab Results   Component Value Date    LABA1C 6.4 12/10/2019    BUN 16 2019    CREATININE 0.8 2019     There were no vitals filed for this visit. Wt Readings from Last 3 Encounters:   12/10/19 203 lb 9.6 oz (92.4 kg)   10/15/19 192 lb 7.4 oz (87.3 kg)   19 192 lb 8 oz (87.3 kg)     Ht Readings from Last 3 Encounters:   12/10/19 6' (1.829 m)   10/15/19 6' 0.01\" (1.829 m)   19 6' (1.829 m)       Glucose at 1.5 hrs PPD today resulted at 144mg/dl  Current monitoring regimen: home blood tests - 4 times daily ; [facility] blood sugar trends: unable to download Marcos/ not shared. BS's variable  Any episodes of hypoglycemia? yes - highest risk fasting 1-3 per week  Previous visit with dietician: remote  Current diet: B- small juice (if low); 1/2 bagel or english muffin                L- 6 in sub; few chips                D- shredded chicken sand 2 buns               Snacks-minimal  Current exercise: treadmill 27 minutes; daily  Eye exam current (within one year): yes 20 [facility] Rt eye surgery 20-minimal vision. Lt eye stable -last injection 10/2019  Any history of foot problems? no  Last foot exam: 19  Immunizations up to date: yes -   Taking ASA:  Yes   Appropriate for use of MyChart Glucose Grid:  Yes    Focus: Follow up visit. Leyla Maldonado is using the Tandem pump and Marcos sensor. He did not like the Dexcom sensor and feels he is doing well with the Schaefer at this time. Reminded that the Dexcom could provide warnings for low BS; that Silvano cannot.  Recent A1C 6.4%; but glucose

## 2020-02-03 ASSESSMENT — ENCOUNTER SYMPTOMS
GASTROINTESTINAL NEGATIVE: 1
RESPIRATORY NEGATIVE: 1

## 2020-02-03 NOTE — PROGRESS NOTES
[address] [place] [facility] [address]  Dept: [phone]  Dept Fax: [phone]  Loc: [phone]  PROGRESS NOTE      VisitDate: 1/30/2020    Gretchen Mclaughlin is a 51 y.o. male who presents today for:     Chief Complaint   Patient presents with    Headache     top right of head, had surgery on right eye-had pressure checked and it was fine, dull pain-comparable to ice cream HA, excederine migraine with some relief, still able to complete everyday tasks, denies fever or stuffy nose         Subjective:  Patient presents with complaint of of right sided headache for the past 5 days. Reports that he recently had retinal surgery and a follow-up with ophthalmology. Rates headache pain 3-4 out of 10. Excedrin Migraine with some relief. denies fever, syncope,, dizziness, chest pain, shortness of breath, rash, abdominal pain or edema. Review of Systems   Constitutional: Negative. HENT: Negative. Eyes: Positive for visual disturbance. Respiratory: Negative. Cardiovascular: Negative. Gastrointestinal: Negative. Endocrine: Negative. Genitourinary: Negative. Musculoskeletal: Negative. Neurological: Positive for headaches. Negative for dizziness, syncope, facial asymmetry, speech difficulty and weakness. Hematological: Negative. Psychiatric/Behavioral: Negative.       Past Medical History:   Diagnosis Date    Anxiety     Detached retina 08/2018    right    Diabetic retinopathy ([address]     bilat, has had laser and injections    Ganglion cyst     Heart burn     Hyperlipidemia     Hypertension     Insulin pump in place     Neuropathy     Pneumonia     at age 26   [facility] Retinal detachment     RIGHT    Sleep apnea     has Cpap at home but does not wear like suppose to    Type I (juvenile type) diabetes mellitus without mention of complication, not stated as uncontrolled     12/1982    Wears glasses     usually uses contact lenses      Past Surgical History:   Procedure Laterality Date    CYST REMOVAL      rt foot, ganglion    EYE SURGERY      has had laser to both eyes in office    [place] OFFICE/OUTPT VISIT,PROCEDURE ONLY Right 8/15/2018    VITRECTOMY 23 GAUGE, MEMBRANE PEELING, AIR FLUID EXCHANGE, AIR GAS EXCHANGE WITH 16% C3F8, ENDOLASER, 200 MSECONDS, 200 MWATTS, 809 PULSES performed by Pedro Dumas MD at [address] [place] [address] OFFICE/OUTPT VISIT,PROCEDURE ONLY Right 2018    VITRECTOMY 23 GAUGE WITH SCLERAL BUCKLE, SILICONE OIL INSERTION, ENDOLASER, 200 MWATTS, 200 MSECONDS, 312 PULSES. performed by Pedro Dumas MD at [facility]  2016    has never had lasik eye surgery    VITRECTOMY Right 08/15/2018    membrane peeling, air gas exchange    VITRECTOMY Right 08/15/2018    Dr Levar Salgado    VITRECTOMY Right     laser silicone oil injection    WISDOM TOOTH EXTRACTION       Family History   Problem Relation Age of Onset    Heart Disease Father     Asthma Father     High Blood Pressure Father      Social History     Tobacco Use    Smoking status: Former Smoker     Packs/day: 0.25     Years: 15.00     Pack years: 3.75     Last attempt to quit: 2002     Years since quittin.0    Smokeless tobacco: Current User     Types: Chew   Substance Use Topics    Alcohol use:  Yes     Alcohol/week: 3.0 standard drinks     Types: 3 Cans of beer per week     Comment: daily      Current Outpatient Medications   Medication Sig Dispense Refill    butalbital-acetaminophen-caffeine (FIORICET, ESGIC) -40 MG per tablet Take 1 tablet by mouth every 6 hours as needed for Headaches 60 tablet 0    ciprofloxacin (CIPRO) 500 MG tablet Take 1 tablet by mouth 2 times daily for 10 days 20 tablet 0    cetirizine (ZYRTEC) 10 MG tablet Take 1 tablet by mouth daily 30 tablet 0    tadalafil (CIALIS) 20 MG tablet Use as directed, not more than 4 tablets/week 40 tablet 1    escitalopram (LEXAPRO) 20 Standing Status:   Future     Standing Expiration Date:   1/29/2021       Patient giveneducational materials - see patient instructions. Discussed use, benefit, and side effects of prescribed medications. All patient questions answered. Pt voiced understanding. Reviewed health maintenance. Patient agreedwith treatment plan. Follow up as directed. Continue medications as prescribed.  Educational information on above diagnosis  provided per AVS.  Patient instructed to go to ED for further evaluation if headache worsens    Electronically signed by JESÚS Ruano CNP on 2/3/2020 at 9:51 AM

## 2020-02-06 ENCOUNTER — PATIENT MESSAGE (OUTPATIENT)
Dept: FAMILY MEDICINE CLINIC | Age: 50
End: 2020-02-06

## 2020-02-06 ENCOUNTER — HOSPITAL ENCOUNTER (OUTPATIENT)
Dept: CT IMAGING | Age: 50
Discharge: HOME | End: 2020-02-06
Payer: COMMERCIAL

## 2020-02-06 PROCEDURE — 70450 CT HEAD/BRAIN W/O DYE: CPT

## 2020-02-24 RX ORDER — CETIRIZINE HYDROCHLORIDE 10 MG/1
TABLET ORAL
Qty: 30 TABLET | Refills: 11 | Status: SHIPPED | OUTPATIENT
Start: 2020-02-24 | End: 2021-09-14

## 2020-03-16 RX ORDER — ESCITALOPRAM OXALATE 20 MG/1
TABLET ORAL
Qty: 90 TABLET | Refills: 0 | Status: SHIPPED | OUTPATIENT
Start: 2020-03-16 | End: 2020-06-17

## 2020-03-16 NOTE — TELEPHONE ENCOUNTER
Please approve or deny     Last Visit Date:  1/30/2020       Next Visit Date:    Visit date not found     Last filled 12-10-19 no refills, 90 tab

## 2020-05-06 ENCOUNTER — TELEMEDICINE (OUTPATIENT)
Dept: FAMILY MEDICINE CLINIC | Age: 50
End: 2020-05-06
Payer: COMMERCIAL

## 2020-05-06 PROCEDURE — 99213 OFFICE O/P EST LOW 20 MIN: CPT | Performed by: FAMILY MEDICINE

## 2020-05-06 RX ORDER — PREDNISONE 20 MG/1
60 TABLET ORAL DAILY
Qty: 21 TABLET | Refills: 0 | Status: SHIPPED | OUTPATIENT
Start: 2020-05-06 | End: 2020-05-13

## 2020-05-06 ASSESSMENT — ENCOUNTER SYMPTOMS
RHINORRHEA: 0
EYE PAIN: 0
ABDOMINAL PAIN: 0
SORE THROAT: 0
CONSTIPATION: 0
COUGH: 0
SHORTNESS OF BREATH: 0
DIARRHEA: 0
NAUSEA: 0
ABDOMINAL DISTENTION: 0
SINUS PRESSURE: 0

## 2020-05-06 NOTE — PROGRESS NOTES
Sharon Levi MD   cetirizine (ZYRTEC) 10 MG tablet TAKE 1 TABLET BY MOUTH EVERY DAY  JESÚS Melissa CNP   butalbital-acetaminophen-caffeine (FIORICET, ESGIC) -40 MG per tablet Take 1 tablet by mouth every 6 hours as needed for Headaches  JESÚS Sterling CNP   tadalafil (CIALIS) 20 MG tablet Use as directed, not more than 4 tablets/week  JESÚS Stubbs CNP   pantoprazole (PROTONIX) 40 MG tablet TAKE 1 TABLET BY MOUTH EVERY DAY  JESÚS Stubbs CNP   insulin aspart (NOVOLOG) 100 UNIT/ML injection vial Inject per insulin pump: basal 12mn 0.95, 5am 1.45, 9am 1.0, 5pm 1.4, 9pm 1.4 Carb ratio: 12mn 8, 5am 9, 9am 12, 5pm 8.5, 9pm 7 Sensitivity: 12mn 35, 9am 33, 10pm 28  Jayce James MD   atorvastatin (LIPITOR) 10 MG tablet TAKE 1 TABLET BY MOUTH DAILY  JESÚS Agarwal CNP   quinapril (ACCUPRIL) 10 MG tablet TAKE 1 TABLET BY MOUTH EVERY DAY  JESÚS Agarwal CNP   Continuous Blood Gluc Sensor (FREESTYLE TIBURCIO 14 DAY SENSOR) MISC Apply one sensor every 14 days  JESÚS Agarwal CNP   Continuous Blood Gluc Sensor (FREESTYLE TIBURCIO 14 DAY SENSOR) MISC 1 sensor every 14 days (2 per month)  Jayce James MD   blood glucose test strips (ONE TOUCH ULTRA TEST) strip Use to test blood glucose levels up to 6 times per day. Diagnosis: E10.9  JESÚS Montgomery CNP   glucagon 1 MG injection Inject 1 mg into the skin See Admin Instructions Follow package directions for low blood sugar. Cathryn Cushing, MD   acetone, urine, test strip Please test urine for ketones if you have nausea, vomiting and abdominal pain.   Cathryn Cushing, MD   aspirin 81 MG tablet Take 81 mg by mouth nightly   Historical Provider, MD   Insulin Syringe-Needle U-100 31G X 5/16\" 0.5 ML MISC 1 each by Does not apply route 4 times daily  Tiffanie Yoder MD       Social History     Tobacco Use    Smoking status: Former Smoker     Packs/day: 0.25     Years: 15.00     Pack years: 3.75     Last attempt to quit: 2002     Years since quittin.2    Smokeless tobacco: Current User     Types: Chew   Substance Use Topics    Alcohol use: Yes     Alcohol/week: 3.0 standard drinks     Types: 3 Cans of beer per week     Comment: daily    Drug use: No        No Known Allergies,   Past Medical History:   Diagnosis Date    Anxiety     Detached retina 2018    right    Diabetic retinopathy ([address]     bilat, has had laser and injections    Ganglion cyst     Heart burn     Hyperlipidemia     Hypertension     Insulin pump in place     Neuropathy     Pneumonia     at age 26   [facility] Retinal detachment     RIGHT    Sleep apnea     has Cpap at home but does not wear like suppose to    Type I (juvenile type) diabetes mellitus without mention of complication, not stated as uncontrolled     1982    Wears glasses     usually uses contact lenses   ,   Past Surgical History:   Procedure Laterality Date    CYST REMOVAL      rt foot, ganglion    EYE SURGERY      has had laser to both eyes in office    [place] OFFICE/OUTPT VISIT,PROCEDURE ONLY Right 8/15/2018    VITRECTOMY 23 GAUGE, MEMBRANE PEELING, AIR FLUID EXCHANGE, AIR GAS EXCHANGE WITH 16% C3F8, ENDOLASER, 200 MSECONDS, 200 MWATTS, 809 PULSES performed by Natalia Rene MD at [address] [place] [address] OFFICE/OUTPT VISIT,PROCEDURE ONLY Right 2018    VITRECTOMY 23 GAUGE WITH SCLERAL BUCKLE, SILICONE OIL INSERTION, ENDOLASER, 200 MWATTS, 200 MSECONDS, 312 PULSES.  performed by Natalia Rene MD at [address] [place] [address]  2016    has never had lasik eye surgery    VITRECTOMY Right 08/15/2018    membrane peeling, air gas exchange    VITRECTOMY Right 08/15/2018    Dr Almaz Flores VITRECTOMY Right     laser silicone oil injection    WISDOM TOOTH EXTRACTION     ,   Social History     Tobacco Use    Smoking status: Former Smoker     Packs/day: 0.25     Years: 15.00     Pack years: 3.75     Last attempt to quit: 2002

## 2020-06-09 RX ORDER — FLASH GLUCOSE SENSOR
KIT MISCELLANEOUS
Qty: 1 EACH | Refills: 3 | Status: SHIPPED | OUTPATIENT
Start: 2020-06-09 | End: 2021-07-12 | Stop reason: SDUPTHER

## 2020-06-17 RX ORDER — ESCITALOPRAM OXALATE 20 MG/1
TABLET ORAL
Qty: 90 TABLET | Refills: 1 | Status: SHIPPED | OUTPATIENT
Start: 2020-06-17 | End: 2020-12-07

## 2020-07-20 ENCOUNTER — OFFICE VISIT (OUTPATIENT)
Dept: INTERNAL MEDICINE CLINIC | Age: 50
End: 2020-07-20
Payer: COMMERCIAL

## 2020-07-20 ENCOUNTER — TELEPHONE (OUTPATIENT)
Dept: INTERNAL MEDICINE CLINIC | Age: 50
End: 2020-07-20

## 2020-07-20 VITALS
BODY MASS INDEX: 26.68 KG/M2 | WEIGHT: 197 LBS | DIASTOLIC BLOOD PRESSURE: 74 MMHG | SYSTOLIC BLOOD PRESSURE: 132 MMHG | HEIGHT: 72 IN | TEMPERATURE: 97.2 F

## 2020-07-20 LAB — HBA1C MFR BLD: 6.3 % (ref 4.3–5.7)

## 2020-07-20 PROCEDURE — G0108 DIAB MANAGE TRN  PER INDIV: HCPCS | Performed by: REGISTERED NURSE

## 2020-07-20 PROCEDURE — 83036 HEMOGLOBIN GLYCOSYLATED A1C: CPT | Performed by: INTERNAL MEDICINE

## 2020-07-20 RX ORDER — TIMOLOL MALEATE 5 MG/ML
1 SOLUTION/ DROPS OPHTHALMIC 2 TIMES DAILY
COMMUNITY
Start: 2020-06-23 | End: 2021-06-08 | Stop reason: ALTCHOICE

## 2020-07-20 NOTE — PATIENT INSTRUCTIONS
Exercise --when putting your pump back on after exercise--set temp basal 50%          Other activity --set temporary basal BEFORE starting your activity  Continue with exercise efforts and weight loss!!! Treatment for low blood: under 60 --30 grams fast glucose                                 60-80 --15 grams fast glucose                          **follow with protein or fat  Pump --Basal rates  12am 0.9 units per hr, 4am 1.55 units per hr, 9am 0.9 units per hr, 6pm 1.4 units per, 9pm 1.4 units per hr                Carb ratio 12am 8 gm, 4am 9gm, 9am 11gm, 5pm 7.5 gm, 9pm 7gm                 Correction 12am 25mg, 4am 35mg, 9am 33mg, 5pm 33mg, 9pm 28mg  Download your pump if you are still having lows or a lot os spikes  A1C today 6.3% Good job!

## 2020-07-20 NOTE — PROGRESS NOTES
[facility]  [address] [facility] [address]  [phone] (phone)  [phone] (fax)    Patient ID: Sindhu Stone [id number]  Referring Provider: Dr. Cate Salvador     Patient's name and  were verified. Subjective:    He presents for His follow-up diabetic visit. He has type 1 diabetes mellitus. Home regimen includes: insulin He is compliant most of the time. Assessment:     Lab Results   Component Value Date    LABA1C 6.4 12/10/2019    BUN 16 2019    CREATININE 0.8 2019     Vitals:    20 0855   Weight: 197 lb (89.4 kg)   Height: 6' (1.829 m)     Wt Readings from Last 3 Encounters:   20 197 lb (89.4 kg)   20 200 lb 12.8 oz (91.1 kg)   20 199 lb (90.3 kg)     Ht Readings from Last 3 Encounters:   20 6' (1.829 m)   12/10/19 6' (1.829 m)   10/15/19 6' 0.01\" (1.829 m)       Glucose at FBS today resulted at 178mg/dl  Current monitoring regimen: home blood tests - 4+ times daily  Home blood sugar trends: see pump download. Trends include spike after lunch and higher BS's 2hrpp dinner. Having lows 3am and 3-4pm  Any episodes of hypoglycemia? yes - 4-5 per week; highest risk late afternoon after exercise and at 3am  Previous visit with dietician: remote  Current diet: B- skips               L- small: 6 inch sub               D- 2 hamburgers               Bedtime -nuts or chips or hotdog or peanut butter  Current exercise: 40 minutes 6 days per week; treadmill alt with free weights. Eye exam current (within one year): yes [facility] --injection left eye  Any history of foot problems? no  Last foot exam: 3/2020 Dr. Indiana Shah . Stubbed left great toe- nail removed. Immunizations up to date: yes -   Taking ASA:  Yes   Appropriate for use of MyChart Glucose Grid:  Yes    Focus:     Diabetes education. Rodger Jain has ramped up his exercise routine and is now having low blood sugars during after exercise.  A temp basal of 50% is not enough to prevent the lows and the pump does not allow for any lower than 50%. Discussed setting temp earlier and decreasing afternoon basal rate as he exercises 6 days per week. He is also having spikes after he lunch and tends to be above goal after dinner. A1C 6.3% today-likely reflective of the number of lows Barbara High is having. Discussed need to minimize lows and spikes in glucose. Barbara High agrees to download pump and notify the clinic if he continues to have high/ low issues. Follow up 3 months. DSME PLAN:   Discussed general issues about diabetes pathophysiology and management. Counseling at today's visit: Treatment for low BS; basal vs bolus; temp basal use; carbs/protein. Exercise --when putting your pump back on after exercise--set temp basal 50%          Other activity --set temporary basal BEFORE starting your activity  Continue with exercise efforts and weight loss!!! Treatment for low blood: under 60 --30 grams fast glucose                                 60-80 --15 grams fast glucose                          **follow with protein or fat  Pump --Basal rates  12am 0.9 units per hr, 4am 1.55 units per hr, 9am 0.9 units per hr, 6pm 1.4 units per, 9pm 1.4 units per hr                Carb ratio 12am 8 gm, 4am 9gm, 9am 11gm, 5pm 7.5 gm, 9pm 7gm                 Correction 12am 25mg, 4am 35mg, 9am 33mg, 5pm 33mg, 9pm 28mg  Download your pump if you are still having lows or a lot os spikes  A1C today 6.3% Good job! Meter download, medications, PMH and nursing assessment reviewed. Meenakshi Manning states He is willing to participate in this plan of care and verbalized understanding of all instructions provided. Teach back used to verify comprehension. Total time involved in direct patient education: 60 minutes.

## 2020-07-29 RX ORDER — QUINAPRIL 10 MG/1
TABLET ORAL
Qty: 90 TABLET | Refills: 3 | Status: SHIPPED | OUTPATIENT
Start: 2020-07-29 | End: 2021-06-30 | Stop reason: SDUPTHER

## 2020-07-29 RX ORDER — ATORVASTATIN CALCIUM 10 MG/1
TABLET, FILM COATED ORAL
Qty: 90 TABLET | Refills: 3 | Status: SHIPPED | OUTPATIENT
Start: 2020-07-29 | End: 2021-06-30 | Stop reason: SDUPTHER

## 2020-07-30 NOTE — TELEPHONE ENCOUNTER
Olvin Perez instructed on insulin pump changes as directed below. Changes made in the pump with Olvin Perez and Olvin Perez voiced understanding of these changes via teach back.

## 2020-07-31 RX ORDER — PANTOPRAZOLE SODIUM 40 MG/1
TABLET, DELAYED RELEASE ORAL
Qty: 90 TABLET | Refills: 2 | Status: SHIPPED | OUTPATIENT
Start: 2020-07-31 | End: 2021-06-17

## 2020-08-10 RX ORDER — FLASH GLUCOSE SENSOR
KIT MISCELLANEOUS
Qty: 2 EACH | Refills: 11 | Status: SHIPPED | OUTPATIENT
Start: 2020-08-10 | End: 2020-10-15 | Stop reason: SDUPTHER

## 2020-10-15 ENCOUNTER — OFFICE VISIT (OUTPATIENT)
Dept: INTERNAL MEDICINE CLINIC | Age: 50
End: 2020-10-15
Payer: COMMERCIAL

## 2020-10-15 VITALS
TEMPERATURE: 97.9 F | HEIGHT: 72 IN | SYSTOLIC BLOOD PRESSURE: 131 MMHG | WEIGHT: 200 LBS | DIASTOLIC BLOOD PRESSURE: 74 MMHG | RESPIRATION RATE: 17 BRPM | BODY MASS INDEX: 27.09 KG/M2 | HEART RATE: 78 BPM

## 2020-10-15 LAB — HBA1C MFR BLD: 6.3 % (ref 4.3–5.7)

## 2020-10-15 PROCEDURE — 83036 HEMOGLOBIN GLYCOSYLATED A1C: CPT | Performed by: INTERNAL MEDICINE

## 2020-10-15 PROCEDURE — 99214 OFFICE O/P EST MOD 30 MIN: CPT | Performed by: INTERNAL MEDICINE

## 2020-10-15 NOTE — PROGRESS NOTES
[facility] PROFESSIONAL SERVS  PHYSICIANS [facility] [address] [place] [place], Brando Miramontes  Dept: [phone]  Dept Fax: [phone]      Chief Complaint   Patient presents with    1 Year Follow Up    Diabetes     TYPE 1         Patient presents for evaluation of diabetes. I saw him 2 years ago  He saw Viji Connor 1 year ago this patient is followed regularly by Dr. Madeleine Stevens. Used to see Dr Talat Chappell  Now sees Brandon Urbina in the diabetic clinic  He has been diabetic for 36 years. He is on an insulin pump for the last 4 years  He has had 3 retinal detachment, loss of sight in his right eye. He does have the [employer] [employer] system    Past Medical History:   Diagnosis Date    Anxiety     Detached retina 08/2018    right    Diabetic retinopathy ([address]     bilat, has had laser and injections    Ganglion cyst     Heart burn     Hyperlipidemia     Hypertension     Insulin pump in place     Neuropathy     Pneumonia     at age 26   [facility] Retinal detachment     RIGHT    Sleep apnea     has Cpap at home but does not wear like suppose to    Type I (juvenile type) diabetes mellitus without mention of complication, not stated as uncontrolled     12/1982    Wears glasses     usually uses contact lenses       Current Outpatient Medications   Medication Sig Dispense Refill    Continuous Blood Gluc Sensor (FREESTYLE TIBURCIO 14 DAY SENSOR) MISC 1 sensor every 14 days (2 per month) 2 each 11    insulin aspart (NOVOLOG) 100 UNIT/ML injection vial PATIENT IS USING A MAX OF 80 UNITS PER DAY.  80 mL 1    pantoprazole (PROTONIX) 40 MG tablet TAKE 1 TABLET BY MOUTH EVERY DAY 90 tablet 2    atorvastatin (LIPITOR) 10 MG tablet TAKE 1 TABLET BY MOUTH EVERY DAY 90 tablet 3    quinapril (ACCUPRIL) 10 MG tablet TAKE 1 TABLET BY MOUTH EVERY DAY 90 tablet 3    timolol (TIMOPTIC) 0.5 % ophthalmic solution 1 drop 2 times daily      escitalopram (LEXAPRO) 20 MG tablet TAKE 1 TABLET BY MOUTH EVERY DAY 90 tablet 1    Continuous Blood Social History     Socioeconomic History    Marital status:      Spouse name: Not on file    Number of children: Not on file    Years of education: Not on file    Highest education level: Not on file   Occupational History    Not on file   Social Needs    Financial resource strain: Not hard at all    Food insecurity     Worry: Never true     Inability: Never true   [employer] needs     Medical: No     Non-medical: No   Tobacco Use    Smoking status: Former Smoker     Packs/day: 0.25     Years: 15.00     Pack years: 3.75     Last attempt to quit: 2002     Years since quittin.7    Smokeless tobacco: Current User     Types: Chew   Substance and Sexual Activity    Alcohol use:  Yes     Alcohol/week: 3.0 standard drinks     Types: 3 Cans of beer per week     Comment: daily    Drug use: No    Sexual activity: Not on file   Lifestyle    Physical activity     Days per week: Not on file     Minutes per session: Not on file    Stress: Not on file   Relationships    Social connections     Talks on phone: Not on file     Gets together: Not on file     Attends Sikhism service: Not on file     Active member of club or organization: Not on file     Attends meetings of clubs or organizations: Not on file     Relationship status: Not on file    Intimate partner violence     Fear of current or ex partner: Not on file     Emotionally abused: Not on file     Physically abused: Not on file     Forced sexual activity: Not on file   Other Topics Concern    Not on file   Social History Narrative    Not on file   2 children  Works at [employer] for eye issues  Family History   Problem Relation Age of Onset    Heart Disease Father     Asthma Father     High Blood Pressure Father        Review of Systems - General ROS: Blind in right eye  Psychological ROS: negative  Hematological and Lymphatic ROS: negative  Respiratory ROS: no cough, shortness of breath, or wheezing  Cardiovascular ROS: EYE EXAM EVERY 1-2 YEARS    MONITOR FEET FOR SORES, NEUROPATHY, ETC    REGULAR DENTAL CARE   HgBA1C today is 6.3  I discussed the closed-loop system by [employer]  Kiya Del Rio came in and said he may be a candidate  She will look into it  I will tentatively see him in 1 year

## 2020-10-16 ENCOUNTER — NURSE ONLY (OUTPATIENT)
Dept: FAMILY MEDICINE CLINIC | Age: 50
End: 2020-10-16
Payer: COMMERCIAL

## 2020-10-16 PROCEDURE — 90471 IMMUNIZATION ADMIN: CPT | Performed by: NURSE PRACTITIONER

## 2020-10-16 PROCEDURE — 90686 IIV4 VACC NO PRSV 0.5 ML IM: CPT | Performed by: NURSE PRACTITIONER

## 2020-10-16 RX ORDER — FLASH GLUCOSE SENSOR
KIT MISCELLANEOUS
Qty: 2 EACH | Refills: 11 | Status: SHIPPED | OUTPATIENT
Start: 2020-10-16 | End: 2021-07-10 | Stop reason: SDUPTHER

## 2020-10-16 NOTE — PROGRESS NOTES
Immunizations Administered     Name Date Dose Route    Influenza, Quadv, IM, PF (6 mo and older Fluzone, Flulaval, Fluarix, and 3 yrs and older Afluria) 10/16/2020 0.5 mL Intramuscular    Site: Deltoid- Right    Lot: [id number]    NDC: 31142-923-10

## 2020-12-07 RX ORDER — BUTALBITAL, ACETAMINOPHEN AND CAFFEINE 50; 325; 40 MG/1; MG/1; MG/1
1 TABLET ORAL EVERY 6 HOURS PRN
Qty: 60 TABLET | Refills: 0 | Status: SHIPPED | OUTPATIENT
Start: 2020-12-07 | End: 2021-06-08

## 2020-12-07 RX ORDER — ESCITALOPRAM OXALATE 20 MG/1
TABLET ORAL
Qty: 90 TABLET | Refills: 0 | Status: SHIPPED | OUTPATIENT
Start: 2020-12-07 | End: 2021-05-14

## 2021-03-11 ENCOUNTER — IMMUNIZATION (OUTPATIENT)
Dept: PRIMARY CARE CLINIC | Age: 51
End: 2021-03-11
Payer: COMMERCIAL

## 2021-03-11 PROCEDURE — 91300 COVID-19, PFIZER VACCINE 30MCG/0.3ML DOSE: CPT

## 2021-03-11 PROCEDURE — 0001A COVID-19, PFIZER VACCINE 30MCG/0.3ML DOSE: CPT

## 2021-04-01 ENCOUNTER — IMMUNIZATION (OUTPATIENT)
Dept: PRIMARY CARE CLINIC | Age: 51
End: 2021-04-01
Payer: COMMERCIAL

## 2021-04-01 PROCEDURE — 0002A PR IMM ADMN SARSCOV2 30MCG/0.3ML DIL RECON 2ND DOSE: CPT | Performed by: FAMILY MEDICINE

## 2021-04-01 PROCEDURE — 91300 COVID-19, PFIZER VACCINE 30MCG/0.3ML DOSE: CPT | Performed by: FAMILY MEDICINE

## 2021-04-24 DIAGNOSIS — E10.65 TYPE 1 DIABETES MELLITUS WITH HYPERGLYCEMIA (HCC): ICD-10-CM

## 2021-04-26 RX ORDER — ESCITALOPRAM OXALATE 20 MG/1
TABLET ORAL
Qty: 90 TABLET | Refills: 0 | OUTPATIENT
Start: 2021-04-26

## 2021-05-06 ENCOUNTER — OFFICE VISIT (OUTPATIENT)
Dept: FAMILY MEDICINE CLINIC | Age: 51
End: 2021-05-06
Payer: COMMERCIAL

## 2021-05-06 VITALS
BODY MASS INDEX: 27.12 KG/M2 | DIASTOLIC BLOOD PRESSURE: 70 MMHG | WEIGHT: 200 LBS | SYSTOLIC BLOOD PRESSURE: 122 MMHG | HEART RATE: 86 BPM | RESPIRATION RATE: 18 BRPM

## 2021-05-06 DIAGNOSIS — H65.02 NON-RECURRENT ACUTE SEROUS OTITIS MEDIA OF LEFT EAR: Primary | ICD-10-CM

## 2021-05-06 DIAGNOSIS — E10.8 CONTROLLED DIABETES MELLITUS TYPE 1 WITH COMPLICATIONS (HCC): ICD-10-CM

## 2021-05-06 LAB — HBA1C MFR BLD: 6.8 %

## 2021-05-06 PROCEDURE — 83036 HEMOGLOBIN GLYCOSYLATED A1C: CPT | Performed by: FAMILY MEDICINE

## 2021-05-06 PROCEDURE — 99213 OFFICE O/P EST LOW 20 MIN: CPT | Performed by: FAMILY MEDICINE

## 2021-05-06 RX ORDER — AMOXICILLIN 500 MG/1
500 CAPSULE ORAL 3 TIMES DAILY
Qty: 30 CAPSULE | Refills: 0 | Status: SHIPPED | OUTPATIENT
Start: 2021-05-06 | End: 2021-05-13 | Stop reason: ALTCHOICE

## 2021-05-06 ASSESSMENT — PATIENT HEALTH QUESTIONNAIRE - PHQ9
SUM OF ALL RESPONSES TO PHQ9 QUESTIONS 1 & 2: 0
1. LITTLE INTEREST OR PLEASURE IN DOING THINGS: 0

## 2021-05-09 ASSESSMENT — ENCOUNTER SYMPTOMS
NAUSEA: 0
EYE PAIN: 0
CONSTIPATION: 0
ABDOMINAL PAIN: 0
SORE THROAT: 0
RHINORRHEA: 0
COUGH: 0
SHORTNESS OF BREATH: 0
DIARRHEA: 0
ABDOMINAL DISTENTION: 0
SINUS PRESSURE: 0

## 2021-05-09 NOTE — PROGRESS NOTES
[address] [place] [facility] [address]  Dept: [phone]  Dept Fax: [phone]  Loc: [phone]  PROGRESS NOTE      VisitDate: 5/6/2021    Davon Leigh is a 53 y.o. male who presents today for:     Chief Complaint   Patient presents with    Otalgia     left x 2 days          Subjective:  HPI  Type I diabetic trolled due for A1c, comes in with left-sided ear pain. Pain is been present for 2 days no discharge or drainage. No fevers chills. Rarely gets allergy symptoms. Review of Systems   Constitutional: Negative for appetite change and fever. HENT: Positive for ear pain. Negative for congestion, postnasal drip, rhinorrhea, sinus pressure and sore throat. Eyes: Negative for pain and visual disturbance. Respiratory: Negative for cough and shortness of breath. Cardiovascular: Negative for chest pain. Gastrointestinal: Negative for abdominal distention, abdominal pain, constipation, diarrhea and nausea. Genitourinary: Negative for dysuria, frequency and urgency. Musculoskeletal: Negative for arthralgias. Skin: Negative for rash. Neurological: Negative for dizziness.      Past Medical History:   Diagnosis Date    Anxiety     Detached retina 08/2018    right    Diabetic retinopathy ([address]     bilat, has had laser and injections    Ganglion cyst     Heart burn     Hyperlipidemia     Hypertension     Insulin pump in place     Neuropathy     Pneumonia     at age 26   [facility] Retinal detachment     RIGHT    Sleep apnea     has Cpap at home but does not wear like suppose to    Type I (juvenile type) diabetes mellitus without mention of complication, not stated as uncontrolled     12/1982    Wears glasses     usually uses contact lenses      Past Surgical History:   Procedure Laterality Date    CYST REMOVAL  1992    rt foot, ganglion    EYE SURGERY      has had laser to both eyes in office    [place] OFFICE/OUTPT VISIT,PROCEDURE ONLY Right 8/15/2018    VITRECTOMY 23 GAUGE, MEMBRANE PEELING, AIR FLUID EXCHANGE, AIR GAS EXCHANGE WITH 16% C3F8, ENDOLASER, 200 MSECONDS, 200 MWATTS, 809 PULSES performed by Marquis Peace MD at [address] OFFICE/OUTPT VISIT,PROCEDURE ONLY Right 2018    VITRECTOMY 23 GAUGE WITH SCLERAL BUCKLE, SILICONE OIL INSERTION, ENDOLASER, 200 MWATTS, 200 MSECONDS, 312 PULSES. performed by Marquis Peace MD at [address] [place] [address]  2016    has never had lasik eye surgery    VITRECTOMY Right 08/15/2018    membrane peeling, air gas exchange    VITRECTOMY Right 08/15/2018    Dr Jennyfer Nicholas    VITRECTOMY Right     laser silicone oil injection    WISDOM TOOTH EXTRACTION       Family History   Problem Relation Age of Onset    Heart Disease Father     Asthma Father     High Blood Pressure Father      Social History     Tobacco Use    Smoking status: Former Smoker     Packs/day: 0.25     Years: 15.00     Pack years: 3.75     Quit date: 2002     Years since quittin.2    Smokeless tobacco: Current User     Types: Chew   Substance Use Topics    Alcohol use: Yes     Alcohol/week: 3.0 standard drinks     Types: 3 Cans of beer per week     Comment: daily      Current Outpatient Medications   Medication Sig Dispense Refill    amoxicillin (AMOXIL) 500 MG capsule Take 1 capsule by mouth 3 times daily for 10 days 30 capsule 0    insulin aspart (NOVOLOG) 100 UNIT/ML injection vial PATIENT IS USING A MAX OF 80 UNITS PER DAY.  80 mL 1    butalbital-acetaminophen-caffeine (FIORICET, ESGIC) -40 MG per tablet TAKE 1 TABLET BY MOUTH EVERY 6 HOURS AS NEEDED FOR HEADACHES 60 tablet 0    escitalopram (LEXAPRO) 20 MG tablet TAKE 1 TABLET BY MOUTH EVERY DAY 90 tablet 0    Continuous Blood Gluc Sensor (FREESTYLE TIBURCIO 14 DAY SENSOR) MISC 1 sensor every 14 days (2 per month) 2 each 11    pantoprazole (PROTONIX) 40 MG tablet TAKE 1 TABLET BY MOUTH EVERY DAY 90 tablet 2    atorvastatin (LIPITOR) 10 MG tablet TAKE 1 TABLET BY MOUTH EVERY DAY 90 tablet 3    quinapril (ACCUPRIL) 10 MG tablet TAKE 1 TABLET BY MOUTH EVERY DAY 90 tablet 3    timolol (TIMOPTIC) 0.5 % ophthalmic solution 1 drop 2 times daily      Continuous Blood Gluc Sensor (FREESTYLE TIBURCIO 14 DAY SENSOR) MISC APPLY ONE SENSOR EVERY 14 DAYS 1 each 3    cetirizine (ZYRTEC) 10 MG tablet TAKE 1 TABLET BY MOUTH EVERY DAY 30 tablet 11    tadalafil (CIALIS) 20 MG tablet Use as directed, not more than 4 tablets/week 40 tablet 1    blood glucose test strips (ONE TOUCH ULTRA TEST) strip Use to test blood glucose levels up to 6 times per day. Diagnosis: E10.9 550 strip 1    glucagon 1 MG injection Inject 1 mg into the skin See Admin Instructions Follow package directions for low blood sugar. 1 kit 3    acetone, urine, test strip Please test urine for ketones if you have nausea, vomiting and abdominal pain. 10 strip 1    aspirin 81 MG tablet Take 81 mg by mouth nightly       Insulin Syringe-Needle U-100 31G X 5/16\" 0.5 ML MISC 1 each by Does not apply route 4 times daily 100 each 2     No current facility-administered medications for this visit.       Allergies   Allergen Reactions    Seasonal      Health Maintenance   Topic Date Due    Hepatitis C screen  Never done    HIV screen  Never done    Hepatitis B vaccine (1 of 3 - Risk 3-dose series) Never done    DTaP/Tdap/Td vaccine (1 - Tdap) Never done    Pneumococcal 0-64 years Vaccine (1 of 1 - PPSV23) 02/04/2020    Diabetic microalbuminuria test  11/04/2020    Lipid screen  11/04/2020    Potassium monitoring  11/04/2020    Creatinine monitoring  11/04/2020    Diabetic retinal exam  06/24/2021    Diabetic foot exam  07/20/2021    A1C test (Diabetic or Prediabetic)  05/06/2022    Flu vaccine  Completed    COVID-19 Vaccine  Completed    Hepatitis A vaccine  Aged Out    Hib vaccine  Aged Out    Meningococcal (ACWY) vaccine  Aged Out         Objective:

## 2021-05-11 ENCOUNTER — TELEPHONE (OUTPATIENT)
Dept: FAMILY MEDICINE CLINIC | Age: 51
End: 2021-05-11

## 2021-05-11 DIAGNOSIS — H65.02 NON-RECURRENT ACUTE SEROUS OTITIS MEDIA OF LEFT EAR: Primary | ICD-10-CM

## 2021-05-11 RX ORDER — AZITHROMYCIN 250 MG/1
250 TABLET, FILM COATED ORAL SEE ADMIN INSTRUCTIONS
Qty: 6 TABLET | Refills: 0 | Status: SHIPPED | OUTPATIENT
Start: 2021-05-11 | End: 2021-05-16

## 2021-05-11 NOTE — TELEPHONE ENCOUNTER
Zithromax is sent to [facility] per Dr Barbara Strange and Meryl Hernandez was notified.

## 2021-05-11 NOTE — TELEPHONE ENCOUNTER
Continues to have left ear pain, deep inside, did not get any better. No fever, no drainage. Still taking Amoxil 500 TID x 10 days.  He states Cipro drops has helped in past.     [facility]

## 2021-05-12 ENCOUNTER — TELEPHONE (OUTPATIENT)
Dept: FAMILY MEDICINE CLINIC | Age: 51
End: 2021-05-12

## 2021-05-12 ENCOUNTER — HOSPITAL ENCOUNTER (EMERGENCY)
Age: 51
Discharge: HOME | End: 2021-05-12
Payer: COMMERCIAL

## 2021-05-12 ENCOUNTER — NURSE TRIAGE (OUTPATIENT)
Dept: OTHER | Facility: CLINIC | Age: 51
End: 2021-05-12

## 2021-05-12 VITALS
HEIGHT: 72 IN | BODY MASS INDEX: 26.41 KG/M2 | DIASTOLIC BLOOD PRESSURE: 82 MMHG | SYSTOLIC BLOOD PRESSURE: 170 MMHG | HEART RATE: 85 BPM | RESPIRATION RATE: 18 BRPM | OXYGEN SATURATION: 97 % | TEMPERATURE: 98.2 F | WEIGHT: 195 LBS

## 2021-05-12 DIAGNOSIS — H60.392 INFECTIVE OTITIS EXTERNA OF LEFT EAR: Primary | ICD-10-CM

## 2021-05-12 DIAGNOSIS — I88.9 LYMPHADENITIS: ICD-10-CM

## 2021-05-12 DIAGNOSIS — K11.20 SIALOADENITIS: ICD-10-CM

## 2021-05-12 DIAGNOSIS — H66.002 ACUTE SUPPURATIVE OTITIS MEDIA OF LEFT EAR WITHOUT SPONTANEOUS RUPTURE OF TYMPANIC MEMBRANE, RECURRENCE NOT SPECIFIED: ICD-10-CM

## 2021-05-12 PROCEDURE — 6370000000 HC RX 637 (ALT 250 FOR IP): Performed by: NURSE PRACTITIONER

## 2021-05-12 PROCEDURE — 99282 EMERGENCY DEPT VISIT SF MDM: CPT

## 2021-05-12 RX ORDER — CIPROFLOXACIN HYDROCHLORIDE 3.5 MG/ML
1 SOLUTION/ DROPS TOPICAL
Status: DISCONTINUED | OUTPATIENT
Start: 2021-05-12 | End: 2021-05-12 | Stop reason: HOSPADM

## 2021-05-12 RX ADMIN — CIPROFLOXACIN 1 DROP: 3 SOLUTION OPHTHALMIC at 19:16

## 2021-05-12 ASSESSMENT — PAIN DESCRIPTION - LOCATION: LOCATION: EAR

## 2021-05-12 ASSESSMENT — PAIN SCALES - GENERAL: PAINLEVEL_OUTOF10: 8

## 2021-05-12 ASSESSMENT — PAIN DESCRIPTION - ORIENTATION: ORIENTATION: LEFT

## 2021-05-12 NOTE — TELEPHONE ENCOUNTER
Reason for Disposition   New onset jaw pain of unknown cause AND at least one cardiac risk factor (i.e., hypertension, diabetes, obesity, smoker or strong family history of heart disease)    Answer Assessment - Initial Assessment Questions  1. ONSET: \"When did the pain start? \" (e.g., minutes, hours, days)      May 6th    2. ONSET: \"Does the pain come and go, or has it been constant since it started? \" (e.g., constant, intermittent, fleeting)      Constant    3. SEVERITY: \"How bad is the pain? \"   (Scale 1-10; mild, moderate or severe)    - MILD (1-3): doesn't interfere with normal activities     - MODERATE (4-7): interferes with normal activities or awakens from sleep     - SEVERE (8-10): excruciating pain, unable to do any normal activities       6-7    4. LOCATION: \"Where does it hurt? \"       Left ear, jaw and neck    5. RASH: \"Is there any redness, rash, or swelling of the face? \"      Swelling in jaw/neck area,  A little red in ear. 6. FEVER: \"Do you have a fever? \" If so, ask: \"What is it, how was it measured, and when did it start? \"       No     7. OTHER SYMPTOMS: \"Do you have any other symptoms? \" (e.g., fever, toothache, nasal discharge, nasal congestion, clicking sensation in jaw joint)      Trouble chewing, left ear pain    8. PREGNANCY: \"Is there any chance you are pregnant? \" \"When was your last menstrual period? \"      N/a    Protocols used: FACE PAIN-ADULT-[place]    Received call from Robson Sheldon at pre-service center [facility]) [address] [facility] with [employer] Complaint. Brief description of triage: see above    2nd level triage completed with Charbel Hodge NP; provider recommends patient be seen in ED. (Attempted 2nd level with PCP office, however nobody answered. Also attempted to contact Jenny Arita NP and Elisa Bajwa NP with no success.)    Care advice provided, patient verbalizes understanding; denies any other questions or concerns; instructed to call back for any new or worsening symptoms.     Attention Provider: Thank you for allowing me to participate in the care of your patient. The patient was connected to triage in response to information provided to the ECC. Please do not respond through this encounter as the response is not directed to a shared pool.

## 2021-05-12 NOTE — TELEPHONE ENCOUNTER
----- Message from Kaylyn Estrella sent at 5/12/2021  3:37 PM EDT -----  Subject: Message to Provider    QUESTIONS  Information for Provider? Patient reports he was seen on 5/6/21 for an ear   ache. He was given Amoxicillin-didn't work. He was then given a Z-James and   it is not working either. He report he is in serious pain and needs to   discuss other medication or options. Patient request call back to discuss. ---------------------------------------------------------------------------  --------------  Oziel LOPEZ  What is the best way for the office to contact you? OK to leave message on   voicemail  Preferred Call Back Phone Number? [phone]  ---------------------------------------------------------------------------  --------------  SCRIPT ANSWERS  Relationship to Patient?  Self

## 2021-05-12 NOTE — ED TRIAGE NOTES
Patient to ED from home with complaints of left ear ache. Pt states that he was prescribed amoxicillin a week ago and then his PCP added a zpack due to amoxicillin not touching. Pt states he is on day two of the zpack and symptoms have not gotten better. Pt rates pain in left ear 7/10 at this time. Pt states he normally receives Ciprofloxacin for this and normally it helps.

## 2021-05-13 ENCOUNTER — OFFICE VISIT (OUTPATIENT)
Dept: FAMILY MEDICINE CLINIC | Age: 51
End: 2021-05-13
Payer: COMMERCIAL

## 2021-05-13 VITALS
BODY MASS INDEX: 26.29 KG/M2 | DIASTOLIC BLOOD PRESSURE: 64 MMHG | SYSTOLIC BLOOD PRESSURE: 116 MMHG | HEART RATE: 80 BPM | WEIGHT: 198.4 LBS | OXYGEN SATURATION: 97 % | TEMPERATURE: 98.1 F | HEIGHT: 73 IN

## 2021-05-13 DIAGNOSIS — H60.502 ACUTE OTITIS EXTERNA OF LEFT EAR, UNSPECIFIED TYPE: Primary | ICD-10-CM

## 2021-05-13 PROCEDURE — 99213 OFFICE O/P EST LOW 20 MIN: CPT | Performed by: FAMILY MEDICINE

## 2021-05-13 RX ORDER — SODIUM CHLORIDE 5 %
DROPS OPHTHALMIC (EYE)
COMMUNITY
Start: 2021-04-24 | End: 2021-06-08 | Stop reason: ALTCHOICE

## 2021-05-13 RX ORDER — CIPROFLOXACIN AND DEXAMETHASONE 3; 1 MG/ML; MG/ML
4 SUSPENSION/ DROPS AURICULAR (OTIC) 2 TIMES DAILY
Qty: 1 BOTTLE | Refills: 0 | Status: SHIPPED | OUTPATIENT
Start: 2021-05-13 | End: 2021-05-20

## 2021-05-13 ASSESSMENT — ENCOUNTER SYMPTOMS
BACK PAIN: 0
CHEST TIGHTNESS: 0
FACIAL SWELLING: 1
SHORTNESS OF BREATH: 0
EYE REDNESS: 0
RHINORRHEA: 0
NAUSEA: 0
VOMITING: 0
COUGH: 0

## 2021-05-13 NOTE — ED PROVIDER NOTES
[facility] Emergency Department    CHIEF COMPLAINT       Chief Complaint   Patient presents with    Otalgia       Nurses Notes reviewed and I agree except as noted in the HPI. HISTORY OF PRESENT ILLNESS    Pako Del Rio mynor 53 y.o. male who presents to the ED for evaluation of left ear pain and facial swelling. The patient was dx with an ear infection and put on amoxicillin. He completed the entire course without improvement. His pcp then put him on zithromax. He is [facility] through that and still not better. He states usually cipro ear drops are needed. He also c/o swelling around the ear and into the jaw. No fever. No mastoid tenderness. HPI was provided by the patient    REVIEW OF SYSTEMS     Review of Systems   Constitutional: Negative for chills, fatigue and fever. HENT: Positive for ear pain and facial swelling. Negative for congestion, ear discharge, postnasal drip and rhinorrhea. Eyes: Negative for redness. Respiratory: Negative for cough, chest tightness and shortness of breath. Cardiovascular: Negative for chest pain. Gastrointestinal: Negative for nausea and vomiting. Genitourinary: Negative for difficulty urinating, dysuria, enuresis, flank pain and hematuria. Musculoskeletal: Negative for back pain and joint swelling. Neurological: Negative for dizziness, light-headedness, numbness and headaches. Psychiatric/Behavioral: Negative for agitation, behavioral problems and confusion. All other systems negative except as noted.       PAST MEDICAL HISTORY     Past Medical History:   Diagnosis Date    Anxiety     Detached retina 08/2018    right    Diabetic retinopathy ([facility] [address]     bilat, has had laser and injections    Ganglion cyst     Heart burn     Hyperlipidemia     Hypertension     Insulin pump in place     Neuropathy     Pneumonia     at age 26   [facility] Retinal detachment     RIGHT    Sleep apnea     has Cpap at home but does not wear like suppose to    Type I (juvenile type) diabetes mellitus without mention of complication, not stated as uncontrolled     12/1982    Wears glasses     usually uses contact lenses       SURGICALHISTORY      has a past surgical history that includes [place] tooth extraction (2005); cyst removal (1992); Refractive surgery (2016); eye surgery; vitrectomy (Right, 08/15/2018); pr office/outpt visit,procedure only (Right, 8/15/2018); vitrectomy (Right, 08/15/2018); vitrectomy (Right, 09/19/2018); and pr office/outpt visit,procedure only (Right, 9/19/2018). CURRENT MEDICATIONS       Discharge Medication List as of 5/12/2021  6:48 PM      CONTINUE these medications which have NOT CHANGED    Details   azithromycin (ZITHROMAX) 250 MG tablet Take 1 tablet by mouth See Admin Instructions for 5 days 500mg on day 1 followed by 250mg on days 2 - 5, Disp-6 tablet, R-0Normal      amoxicillin (AMOXIL) 500 MG capsule Take 1 capsule by mouth 3 times daily for 10 days, Disp-30 capsule, R-0Normal      insulin aspart (NOVOLOG) 100 UNIT/ML injection vial PATIENT IS USING A MAX OF 80 UNITS PER DAY., Disp-80 mL, R-1Normal      butalbital-acetaminophen-caffeine (FIORICET, ESGIC) -40 MG per tablet TAKE 1 TABLET BY MOUTH EVERY 6 HOURS AS NEEDED FOR HEADACHES, Disp-60 tablet, R-0Normal      escitalopram (LEXAPRO) 20 MG tablet TAKE 1 TABLET BY MOUTH EVERY DAY, Disp-90 tablet, R-0Normal      !! Continuous Blood Gluc Sensor (FREESTYLE TIBURCIO 14 DAY SENSOR) [facility] 1 sensor every 14 days (2 per month), Disp-2 each,R-11Normal      pantoprazole (PROTONIX) 40 MG tablet TAKE 1 TABLET BY MOUTH EVERY DAY, Disp-90 tablet,R-2Normal      atorvastatin (LIPITOR) 10 MG tablet TAKE 1 TABLET BY MOUTH EVERY DAY, Disp-90 tablet,R-3Normal      quinapril (ACCUPRIL) 10 MG tablet TAKE 1 TABLET BY MOUTH EVERY DAY, Disp-90 tablet,R-3Normal      timolol (TIMOPTIC) 0.5 % ophthalmic solution 1 drop 2 times dailyHistorical Med      !!  Continuous Blood Gluc Sensor (FREESTYLE TIBURCIO 14 DAY SENSOR) MISC APPLY ONE SENSOR EVERY 14 DAYS, Disp-1 each,R-3Normal      cetirizine (ZYRTEC) 10 MG tablet TAKE 1 TABLET BY MOUTH EVERY DAY, Disp-30 tablet, R-11Normal      tadalafil (CIALIS) 20 MG tablet Use as directed, not more than 4 tablets/week, Disp-40 tablet, R-1Normal      blood glucose test strips (ONE TOUCH ULTRA TEST) strip Disp-550 strip, R-1, NormalUse to test blood glucose levels up to 6 times per day. Diagnosis: E10.9      glucagon 1 MG injection Inject 1 mg into the skin See Admin Instructions Follow package directions for low blood sugar., Disp-1 kit, R-3Normal      acetone, urine, test strip Please test urine for ketones if you have nausea, vomiting and abdominal pain., Disp-10 strip, R-1Normal      aspirin 81 MG tablet Take 81 mg by mouth nightly Historical Med      Insulin Syringe-Needle U-100 31G X 5/16\" 0.5 ML MISC 4 TIMES DAILY Starting 12/21/2015, Until Discontinued, Disp-100 each, R-2, NO PRINT       ! ! - Potential duplicate medications found. Please discuss with provider. ALLERGIES     is allergic to seasonal.    FAMILY HISTORY     He indicated that his mother is alive. He indicated that his father is alive. He indicated that his sister is alive. He indicated that his brother is alive. family history includes Asthma in his father; Heart Disease in his father; High Blood Pressure in his father.     SOCIAL HISTORY       Social History     Socioeconomic History    Marital status:      Spouse name: Not on file    Number of children: Not on file    Years of education: Not on file    Highest education level: Not on file   Occupational History    Not on file   Social Needs    Financial resource strain: Not hard at all    Food insecurity     Worry: Never true     Inability: Never true    Transportation needs     Medical: No     Non-medical: No   Tobacco Use    Smoking status: Former Smoker     Packs/day: 0.25     Years: 15.00     Pack years: 3.75     Quit date: 1/26/2002 Years since quittin.3    Smokeless tobacco: Current User     Types: Chew   Substance and Sexual Activity    Alcohol use: Yes     Alcohol/week: 3.0 standard drinks     Types: 3 Cans of beer per week     Comment: daily    Drug use: No    Sexual activity: Not on file   Lifestyle    Physical activity     Days per week: Not on file     Minutes per session: Not on file    Stress: Not on file   Relationships    Social connections     Talks on phone: Not on file     Gets together: Not on file     Attends Congregational service: Not on file     Active member of club or organization: Not on file     Attends meetings of clubs or organizations: Not on file     Relationship status: Not on file    Intimate partner violence     Fear of current or ex partner: Not on file     Emotionally abused: Not on file     Physically abused: Not on file     Forced sexual activity: Not on file   Other Topics Concern    Not on file   Social History Narrative    Not on file       PHYSICAL EXAM     INITIAL VITALS:  height is 6' (1.829 m) and weight is 195 lb (88.5 kg). His temperature is 98.2 °F (36.8 °C). His blood pressure is 170/82 (abnormal) and his pulse is 85. His respiration is 18 and oxygen saturation is 97%. Physical Exam  Constitutional:       Appearance: Normal appearance. He is well-developed. He is not ill-appearing. HENT:      Head: Normocephalic and atraumatic. Left Ear: Swelling and tenderness present. A middle ear effusion is present. No mastoid tenderness. Tympanic membrane is erythematous. Ears:      Comments: Ear canal is red, inflamed, painful to touch       Mouth/Throat:      Comments: Swelling in the left buccal region, firmness to the salivary gland. Eyes:      Conjunctiva/sclera: Conjunctivae normal.   Cardiovascular:      Rate and Rhythm: Normal rate. Pulmonary:      Effort: Pulmonary effort is normal.   Abdominal:      Palpations: Abdomen is soft. Musculoskeletal: Normal range of motion. Skin:     General: Skin is warm and dry. Capillary Refill: Capillary refill takes less than 2 seconds. Neurological:      Mental Status: He is alert and oriented to person, place, and time. Psychiatric:         Behavior: Behavior normal.         DIFFERENTIAL DIAGNOSIS:   Otitis media, otitis externa, mastoiditis, sialoadenitis. DIAGNOSTIC RESULTS     EKG: All EKG's are interpreted by the Emergency Department Physician who eithersigns or Co-signs this chart in the absence of a cardiologist.        RADIOLOGY: non-plainfilm images(s) such as CT, Ultrasound and MRI are read by the radiologist.  Plain radiographic images are visualized and preliminarily interpreted by the emergency physician unless otherwise stated below. No orders to display         LABS:   Labs Reviewed - No data to display    EMERGENCY DEPARTMENT COURSE:   Vitals:    Vitals:    05/12/21 1635 05/12/21 1636   BP:  (!) 170/82   Pulse: 85    Resp: 18    Temp: 98.2 °F (36.8 °C)    SpO2: 97%    Weight: 195 lb (88.5 kg)    Height: 6' (1.829 m)           [facility]    Patient was seen in the ER for ear pain and facial swelling. Ear is red and painful to touch. Will order cipro ear drops. Patient is to continue the oral abx. If not better in 2-3 days, call ENT for follow up. Patient also likely has sialoadenitis. Instructed to eat sour candy. Patient verbalized understanding and is discharged home. Medications - No data to display      Patient was seen independently by myself. The patient's final impression and disposition and plan was determined by myself. Strict return precautions and follow up instructions were discussed with the patient prior to discharge, with which the patient agrees. Physical assessment findings, diagnostic testing(s) if applicable, and vital signs reviewed with patient/patient representative. Questions answered.    Medications asdirected, including OTC medications for supportive care.   Education provided on medications. Differential diagnosis(s) discussed with patient/patient representative. Home care/self care instructions reviewed withpatient/patient representative. Patient is to follow-up with family care provider in 2-3 days if no improvement. Patient is to go to the emergency department if symptoms worsen. Patient/patient representative isaware of care plan, questions answered, verbalizes understanding and is in agreement. CRITICAL CARE:   None    CONSULTS:  None    PROCEDURES:  None    FINAL IMPRESSION     1. Infective otitis externa of left ear    2. Sialoadenitis    3. Lymphadenitis    4.  Acute suppurative otitis media of left ear without spontaneous rupture of tympanic membrane, recurrence not specified          DISPOSITION/PLAN   DISPOSITION Decision To Discharge 05/12/2021 06:39:38 PM      PATIENT REFERREDTO:  Hao Jarrett MD  [address] [place] [address]  [phone]    Schedule an appointment as soon as possible for a visit in 2 days  For follow up    Melissa Story MD  [address] [facility] [facility] [address]  [phone]    Schedule an appointment as soon as possible for a visit in 2 days  For follow up      [address]:  Discharge Medication List as of 5/12/2021  6:48 PM          (Please note that portions of this note were completed with a voice recognition program.  Efforts were made to edit the dictations but occasionally words are mis-transcribed.)         Branden Early, JESÚS - [address], JESÚS - CNP  05/13/21 9004

## 2021-05-14 RX ORDER — ESCITALOPRAM OXALATE 20 MG/1
TABLET ORAL
Qty: 90 TABLET | Refills: 3 | Status: SHIPPED | OUTPATIENT
Start: 2021-05-14 | End: 2021-08-16 | Stop reason: SDUPTHER

## 2021-05-16 ASSESSMENT — ENCOUNTER SYMPTOMS
SHORTNESS OF BREATH: 0
ABDOMINAL DISTENTION: 0
NAUSEA: 0
RHINORRHEA: 0
EYE PAIN: 0
SORE THROAT: 0
CONSTIPATION: 0
ABDOMINAL PAIN: 0
SINUS PRESSURE: 0
DIARRHEA: 0
COUGH: 0

## 2021-05-17 NOTE — PROGRESS NOTES
[address] [address] [address] [facility] [address]  Dept: [phone]  Dept Fax: [phone]  Loc: [phone]  PROGRESS NOTE      VisitDate: 5/13/2021    Krystal Crump is a 53 y.o. male who presents today for:     Chief Complaint   Patient presents with   Rod Smiles     still having pain. c/o pain in the neck and jaw as well          Subjective:  HPI  Patient comes in still having left-sided ear pain. When he was last here he had evidence of serous otitis started on amoxicillin changed to Zithromax as he was getting no relief. Continues to have pain was seen in urgent care, notes reviewed. Felt he may have had swimmer's ear as well as sialoadenitis. There is no longer any smiling. Review of Systems   Constitutional: Negative for appetite change and fever. HENT: Positive for ear pain. Negative for congestion, postnasal drip, rhinorrhea, sinus pressure and sore throat. Eyes: Negative for pain and visual disturbance. Respiratory: Negative for cough and shortness of breath. Cardiovascular: Negative for chest pain. Gastrointestinal: Negative for abdominal distention, abdominal pain, constipation, diarrhea and nausea. Genitourinary: Negative for dysuria, frequency and urgency. Musculoskeletal: Negative for arthralgias. Skin: Negative for rash. Neurological: Negative for dizziness.      Past Medical History:   Diagnosis Date    Anxiety     Detached retina 08/2018    right    Diabetic retinopathy ([address]     bilat, has had laser and injections    Ganglion cyst     Heart burn     Hyperlipidemia     Hypertension     Insulin pump in place     Neuropathy     Pneumonia     at age 26   [facility] Retinal detachment     RIGHT    Sleep apnea     has Cpap at home but does not wear like suppose to    Type I (juvenile type) diabetes mellitus without mention of complication, not stated as uncontrolled     12/1982    Wears glasses     usually SENSOR) MISC 1 sensor every 14 days (2 per month) 2 each 11    pantoprazole (PROTONIX) 40 MG tablet TAKE 1 TABLET BY MOUTH EVERY DAY 90 tablet 2    atorvastatin (LIPITOR) 10 MG tablet TAKE 1 TABLET BY MOUTH EVERY DAY 90 tablet 3    quinapril (ACCUPRIL) 10 MG tablet TAKE 1 TABLET BY MOUTH EVERY DAY 90 tablet 3    timolol (TIMOPTIC) 0.5 % ophthalmic solution 1 drop 2 times daily      Continuous Blood Gluc Sensor (FREESTYLE TIBURCIO 14 DAY SENSOR) MISC APPLY ONE SENSOR EVERY 14 DAYS 1 each 3    blood glucose test strips (ONE TOUCH ULTRA TEST) strip Use to test blood glucose levels up to 6 times per day. Diagnosis: E10.9 550 strip 1    glucagon 1 MG injection Inject 1 mg into the skin See Admin Instructions Follow package directions for low blood sugar. 1 kit 3    acetone, urine, test strip Please test urine for ketones if you have nausea, vomiting and abdominal pain. 10 strip 1    aspirin 81 MG tablet Take 81 mg by mouth nightly       Insulin Syringe-Needle U-100 31G X 5/16\" 0.5 ML MISC 1 each by Does not apply route 4 times daily 100 each 2    escitalopram (LEXAPRO) 20 MG tablet TAKE 1 TABLET BY MOUTH EVERY DAY 90 tablet 3    [facility] SODIUM CHLORIDE 5 % ophthalmic solution USE 1 DROP IN THE RIGHT EYE FOUR TIMES DAILY.  butalbital-acetaminophen-caffeine (FIORICET, ESGIC) -40 MG per tablet TAKE 1 TABLET BY MOUTH EVERY 6 HOURS AS NEEDED FOR HEADACHES (Patient not taking: Reported on 5/13/2021) 60 tablet 0    cetirizine (ZYRTEC) 10 MG tablet TAKE 1 TABLET BY MOUTH EVERY DAY (Patient not taking: Reported on 5/13/2021) 30 tablet 11    tadalafil (CIALIS) 20 MG tablet Use as directed, not more than 4 tablets/week (Patient not taking: Reported on 5/13/2021) 40 tablet 1     No current facility-administered medications for this visit.      Allergies   Allergen Reactions    Seasonal      Health Maintenance   Topic Date Due    Hepatitis C screen  Never done    HIV screen  Never done    Hepatitis B vaccine (1 types were placed in this encounter. Patient giveneducational materials - see patient instructions. Discussed use, benefit, and side effects of prescribed medications. All patient questions answered. Pt voiced understanding. Reviewed health maintenance. Patient agreedwith treatment plan. Follow up as directed. **This report has been created using voice recognition software. It may contain minor errorswhich are inherent in voice recognition technology. **       Electronically signed by Radha Kidd MD on 5/16/2021 at 8:36 PM

## 2021-06-08 ENCOUNTER — OFFICE VISIT (OUTPATIENT)
Dept: INTERNAL MEDICINE CLINIC | Age: 51
End: 2021-06-08
Payer: COMMERCIAL

## 2021-06-08 DIAGNOSIS — E10.8 CONTROLLED DIABETES MELLITUS TYPE 1 WITH COMPLICATIONS (HCC): ICD-10-CM

## 2021-06-08 PROCEDURE — G0108 DIAB MANAGE TRN  PER INDIV: HCPCS | Performed by: INTERNAL MEDICINE

## 2021-06-08 RX ORDER — TRIPROLIDINE/PSEUDOEPHEDRINE 2.5MG-60MG
1 TABLET ORAL 2 TIMES DAILY
COMMUNITY
Start: 2021-05-20 | End: 2021-12-03 | Stop reason: ALTCHOICE

## 2021-06-08 RX ORDER — DORZOLAMIDE HYDROCHLORIDE AND TIMOLOL MALEATE 20; 5 MG/ML; MG/ML
1 SOLUTION/ DROPS OPHTHALMIC 2 TIMES DAILY
COMMUNITY
Start: 2021-05-20

## 2021-06-08 RX ORDER — LATANOPROST 50 UG/ML
1 SOLUTION/ DROPS OPHTHALMIC NIGHTLY
COMMUNITY
Start: 2021-05-20

## 2021-06-08 NOTE — PATIENT INSTRUCTIONS
Basal rate 12am 1.3 units. Noon carb ratio 10 grams. Goal: minimize blood sugars less than 90 in the middle of the night  Focus on your exercise routine and glucose response       --set a goal for snack before exercise --does it work?                 Think about changing Teddy Vargas for more controlled protein                                  Bar       --use your temp basal to address low blood sugars at the end of                     Work out or soon after [employer] job with exercise routine

## 2021-06-08 NOTE — PROGRESS NOTES
[facility]  [address] [facility], [address]  [phone] (phone)  [phone] (fax)    Patient ID: Maggie Chris [id number]  Referring Provider: Dr. Ross Barreto     Patient's name and  were verified. Subjective:    He presents for His follow-up diabetic visit. He has type 1 diabetes mellitus. Home regimen includes: Insulin He is compliant most of the time. Assessment:     Lab Results   Component Value Date    LABA1C 6.8 2021    BUN 16 2019    CREATININE 0.8 2019     There were no vitals filed for this visit. Wt Readings from Last 3 Encounters:   21 198 lb 6.4 oz (90 kg)   21 195 lb (88.5 kg)   21 200 lb (90.7 kg)     Ht Readings from Last 3 Encounters:   21 6' 1.25\" (1.861 m)   21 6' (1.829 m)   10/15/20 6' (1.829 m)       Current monitoring regimen: home blood tests - 4+ times daily/ [employer] CGM  Home blood sugar trends: see [employer] CGM download  Any episodes of hypoglycemia? yes - 4am  Previous visit with dietician: remote  Current diet: B skips                       L blueberry/spinach/granola/yogurt/ apple smoothie                       D wings (6)limited breading; 2-3 pieces pizza                       Snacking- avoids  Current exercise: 10,000-12,000 steps daily. P90X 50min, push      ups, treadmill  Eye exam current (within one year): yes [facility] 21. New eye drops. Injection left eye every 4 weeks  Any history of foot problems? no  Last foot exam: annual poditary  Immunizations up to date: yes -   Taking ASA:  Yes   Appropriate for use of MyChart Glucose Grid:  Yes    Focus: Follow up visit. A1C from 21 6.8%. Overall, Radha Platt is doing fairly well. He continues to struggle with fine tuning intense aerobic exercise. Reviewed basal vs bolus setting and 2hrpp vs 4hrpp glucose results. Discussed possible pump settings that could address issues of meal clearance or basal needs.  He is waiting for an upgrade on his Tandem pump and Dexcom for control IQ. Currently he is using a loaner pump that will need to be returned by mid July. Follow up visit 3 months. DSME PLAN:   Discussed general issues about diabetes pathophysiology and management. Counseling at today's visit: BG goals; pattern management; carbs/carb counting; exercise; basal vs bolus. Basal rate 12am 1.3 units. Noon carb ratio 10 grams. Goal: minimize blood sugars less than 90 in the middle of the night  Focus on your exercise routine and glucose response       --set a goal for snack before exercise --does it work? Think about changing Myrle Getting for more controlled protein                                  Bar       --use your temp basal to address low blood sugars at the end of                     Work out or soon after [employer] job with exercise routine    [employer], medications, PMH and nursing assessment reviewed. Janessa Jayjay states He is willing to participate in this plan of care and verbalized understanding of all instructions provided. Teach back used to verify comprehension. Total time involved in direct patient education: 60 minutes.

## 2021-06-14 VITALS
HEART RATE: 72 BPM | DIASTOLIC BLOOD PRESSURE: 88 MMHG | BODY MASS INDEX: 27.28 KG/M2 | HEIGHT: 72 IN | TEMPERATURE: 97.4 F | SYSTOLIC BLOOD PRESSURE: 138 MMHG | WEIGHT: 201.4 LBS

## 2021-06-17 RX ORDER — PANTOPRAZOLE SODIUM 40 MG/1
TABLET, DELAYED RELEASE ORAL
Qty: 90 TABLET | Refills: 2 | Status: SHIPPED | OUTPATIENT
Start: 2021-06-17 | End: 2022-04-04

## 2021-06-23 ENCOUNTER — HOSPITAL ENCOUNTER (OUTPATIENT)
Dept: GENERAL RADIOLOGY | Age: 51
Discharge: HOME | End: 2021-06-23
Payer: COMMERCIAL

## 2021-06-23 ENCOUNTER — HOSPITAL ENCOUNTER (OUTPATIENT)
Age: 51
Discharge: HOME | End: 2021-06-23
Payer: COMMERCIAL

## 2021-06-23 ENCOUNTER — OFFICE VISIT (OUTPATIENT)
Dept: FAMILY MEDICINE CLINIC | Age: 51
End: 2021-06-23
Payer: COMMERCIAL

## 2021-06-23 VITALS
HEART RATE: 83 BPM | RESPIRATION RATE: 16 BRPM | DIASTOLIC BLOOD PRESSURE: 74 MMHG | OXYGEN SATURATION: 97 % | SYSTOLIC BLOOD PRESSURE: 130 MMHG | BODY MASS INDEX: 27.12 KG/M2 | WEIGHT: 200 LBS

## 2021-06-23 DIAGNOSIS — R07.9 CHEST PAIN, UNSPECIFIED TYPE: ICD-10-CM

## 2021-06-23 DIAGNOSIS — R07.9 CHEST PAIN, UNSPECIFIED TYPE: Primary | ICD-10-CM

## 2021-06-23 DIAGNOSIS — R10.13 EPIGASTRIC PAIN: ICD-10-CM

## 2021-06-23 LAB
ALBUMIN SERPL-MCNC: 4.3 G/DL (ref 3.5–5.1)
ALP BLD-CCNC: 80 U/L (ref 38–126)
ALT SERPL-CCNC: 31 U/L (ref 11–66)
ANION GAP SERPL CALCULATED.3IONS-SCNC: 9 MEQ/L (ref 8–16)
AST SERPL-CCNC: 25 U/L (ref 5–40)
BASOPHILS # BLD: 1 %
BASOPHILS ABSOLUTE: 0.1 THOU/MM3 (ref 0–0.1)
BILIRUB SERPL-MCNC: 0.5 MG/DL (ref 0.3–1.2)
BUN BLDV-MCNC: 17 MG/DL (ref 7–22)
CALCIUM SERPL-MCNC: 9.7 MG/DL (ref 8.5–10.5)
CHLORIDE BLD-SCNC: 103 MEQ/L (ref 98–111)
CO2: 27 MEQ/L (ref 23–33)
CREAT SERPL-MCNC: 0.9 MG/DL (ref 0.4–1.2)
EOSINOPHIL # BLD: 3.6 %
EOSINOPHILS ABSOLUTE: 0.2 THOU/MM3 (ref 0–0.4)
ERYTHROCYTE [DISTWIDTH] IN BLOOD BY AUTOMATED COUNT: 13.4 % (ref 11.5–14.5)
ERYTHROCYTE [DISTWIDTH] IN BLOOD BY AUTOMATED COUNT: 43.8 FL (ref 35–45)
GFR SERPL CREATININE-BSD FRML MDRD: 89 ML/MIN/1.73M2
GLUCOSE BLD-MCNC: 205 MG/DL (ref 70–108)
HCT VFR BLD CALC: 45.4 % (ref 42–52)
HEMOGLOBIN: 14.6 GM/DL (ref 14–18)
IMMATURE GRANS (ABS): 0.02 THOU/MM3 (ref 0–0.07)
IMMATURE GRANULOCYTES: 0.3 %
LIPASE: 20.8 U/L (ref 5.6–51.3)
LYMPHOCYTES # BLD: 30.9 %
LYMPHOCYTES ABSOLUTE: 1.8 THOU/MM3 (ref 1–4.8)
MCH RBC QN AUTO: 28.9 PG (ref 26–33)
MCHC RBC AUTO-ENTMCNC: 32.2 GM/DL (ref 32.2–35.5)
MCV RBC AUTO: 89.7 FL (ref 80–94)
MONOCYTES # BLD: 9.4 %
MONOCYTES ABSOLUTE: 0.5 THOU/MM3 (ref 0.4–1.3)
NUCLEATED RED BLOOD CELLS: 0 /100 WBC
PLATELET # BLD: 261 THOU/MM3 (ref 130–400)
PMV BLD AUTO: 11.3 FL (ref 9.4–12.4)
POTASSIUM SERPL-SCNC: 4.4 MEQ/L (ref 3.5–5.2)
RBC # BLD: 5.06 MILL/MM3 (ref 4.7–6.1)
SEG NEUTROPHILS: 54.8 %
SEGMENTED NEUTROPHILS ABSOLUTE COUNT: 3.2 THOU/MM3 (ref 1.8–7.7)
SODIUM BLD-SCNC: 139 MEQ/L (ref 135–145)
TOTAL PROTEIN: 6.8 G/DL (ref 6.1–8)
WBC # BLD: 5.8 THOU/MM3 (ref 4.8–10.8)

## 2021-06-23 PROCEDURE — 71046 X-RAY EXAM CHEST 2 VIEWS: CPT

## 2021-06-23 PROCEDURE — 83690 ASSAY OF LIPASE: CPT

## 2021-06-23 PROCEDURE — 85651 RBC SED RATE NONAUTOMATED: CPT

## 2021-06-23 PROCEDURE — 80053 COMPREHEN METABOLIC PANEL: CPT

## 2021-06-23 PROCEDURE — 93000 ELECTROCARDIOGRAM COMPLETE: CPT | Performed by: FAMILY MEDICINE

## 2021-06-23 PROCEDURE — 99214 OFFICE O/P EST MOD 30 MIN: CPT | Performed by: FAMILY MEDICINE

## 2021-06-23 PROCEDURE — 36415 COLL VENOUS BLD VENIPUNCTURE: CPT

## 2021-06-23 PROCEDURE — 82550 ASSAY OF CK (CPK): CPT

## 2021-06-23 PROCEDURE — 85025 COMPLETE CBC W/AUTO DIFF WBC: CPT

## 2021-06-23 SDOH — ECONOMIC STABILITY: FOOD INSECURITY: WITHIN THE PAST 12 MONTHS, YOU WORRIED THAT YOUR FOOD WOULD RUN OUT BEFORE YOU GOT MONEY TO BUY MORE.: NEVER TRUE

## 2021-06-23 SDOH — ECONOMIC STABILITY: FOOD INSECURITY: WITHIN THE PAST 12 MONTHS, THE FOOD YOU BOUGHT JUST DIDN'T LAST AND YOU DIDN'T HAVE MONEY TO GET MORE.: NEVER TRUE

## 2021-06-23 ASSESSMENT — SOCIAL DETERMINANTS OF HEALTH (SDOH): HOW HARD IS IT FOR YOU TO PAY FOR THE VERY BASICS LIKE FOOD, HOUSING, MEDICAL CARE, AND HEATING?: NOT HARD AT ALL

## 2021-06-24 LAB
SEDIMENTATION RATE, ERYTHROCYTE: 5 MM/HR (ref 0–10)
TOTAL CK: 157 U/L (ref 55–170)

## 2021-06-27 ASSESSMENT — ENCOUNTER SYMPTOMS
SINUS PRESSURE: 0
DIARRHEA: 0
EYE PAIN: 0
ABDOMINAL DISTENTION: 0
BACK PAIN: 1
COUGH: 0
SHORTNESS OF BREATH: 0
NAUSEA: 0
ABDOMINAL PAIN: 1
CONSTIPATION: 0
SORE THROAT: 0
RHINORRHEA: 0

## 2021-06-27 NOTE — PROGRESS NOTES
Su Webber [address] [address]  Dept: [phone]  Dept Fax: [phone]  Loc: [phone]  PROGRESS NOTE      VisitDate: 6/23/2021    Quinten Porras is a 53 y.o. male who presents today for:     Chief Complaint   Patient presents with    Back Pain     discomfort in his middle back and chest discomfort x2 weeks         Subjective:  HPI  Patient comes with chest and back pain gets pain is epigastric area she is straight through to his back nothing seems make better worse not associate with exercise or activity, no diaphoresis or shortness of breath eating does not seem to have any effect on it    Review of Systems   Constitutional: Negative for appetite change and fever. HENT: Negative for congestion, ear pain, postnasal drip, rhinorrhea, sinus pressure and sore throat. Eyes: Negative for pain and visual disturbance. Respiratory: Negative for cough and shortness of breath. Cardiovascular: Positive for chest pain. Gastrointestinal: Positive for abdominal pain. Negative for abdominal distention, constipation, diarrhea and nausea. Genitourinary: Negative for dysuria, frequency and urgency. Musculoskeletal: Positive for back pain. Negative for arthralgias. Skin: Negative for rash. Neurological: Negative for dizziness.      Past Medical History:   Diagnosis Date    Anxiety     Detached retina 08/2018    right    Diabetic retinopathy ([address]     bilat, has had laser and injections    Ganglion cyst     Heart burn     Hyperlipidemia     Hypertension     Insulin pump in place     Neuropathy     Pneumonia     at age 26   [facility] Retinal detachment     RIGHT    Sleep apnea     has Cpap at home but does not wear like suppose to    Type I (juvenile type) diabetes mellitus without mention of complication, not stated as uncontrolled     12/1982    Wears glasses     usually uses contact lenses      Past Surgical History: Procedure Laterality Date    CYST REMOVAL      rt foot, ganglion    EYE SURGERY      has had laser to both eyes in office    [place] OFFICE/OUTPT VISIT,PROCEDURE ONLY Right 8/15/2018    VITRECTOMY 23 GAUGE, MEMBRANE PEELING, AIR FLUID EXCHANGE, AIR GAS EXCHANGE WITH 16% C3F8, ENDOLASER, 200 MSECONDS, 200 MWATTS, 809 PULSES performed by Corinna Bermudez MD at [address] OFFICE/OUTPT VISIT,PROCEDURE ONLY Right 2018    VITRECTOMY 23 GAUGE WITH SCLERAL BUCKLE, SILICONE OIL INSERTION, ENDOLASER, 200 MWATTS, 200 MSECONDS, 312 PULSES. performed by Corinna Bermudez MD at [facility]  2016    has never had lasik eye surgery    VITRECTOMY Right 08/15/2018    membrane peeling, air gas exchange    VITRECTOMY Right 08/15/2018    Dr Jeannine Arthur    VITRECTOMY Right     laser silicone oil injection    WISDOM TOOTH EXTRACTION       Family History   Problem Relation Age of Onset    Heart Disease Father     Asthma Father     High Blood Pressure Father      Social History     Tobacco Use    Smoking status: Former Smoker     Packs/day: 0.25     Years: 15.00     Pack years: 3.75     Quit date: 2002     Years since quittin.4    Smokeless tobacco: Current User     Types: Chew   Substance Use Topics    Alcohol use:  Yes     Alcohol/week: 3.0 standard drinks     Types: 3 Cans of beer per week     Comment: daily      Current Outpatient Medications   Medication Sig Dispense Refill    pantoprazole (PROTONIX) 40 MG tablet TAKE 1 TABLET BY MOUTH EVERY DAY 90 tablet 2    latanoprost (XALATAN) 0.005 % ophthalmic solution Apply 1 drop to eye nightly      dorzolamide-timolol (COSOPT) 22.3-6.8 MG/ML ophthalmic solution Place 1 drop into the right eye 2 times daily      difluprednate (DUREZOL) 0.05 % EMUL Place 1 drop into the left eye 2 times daily      Sodium Chloride, Hypertonic, (ADÁN 128 OP) Apply 1 drop to eye 2 times daily Right eye      escitalopram (LEXAPRO) 20 MG tablet TAKE 1 TABLET BY MOUTH EVERY DAY 90 tablet 3    insulin aspart (NOVOLOG) 100 UNIT/ML injection vial PATIENT IS USING A MAX OF 80 UNITS PER DAY. 80 mL 1    Continuous Blood Gluc Sensor (FREESTYLE TIBURCIO 14 DAY SENSOR) [facility] 1 sensor every 14 days (2 per month) 2 each 11    atorvastatin (LIPITOR) 10 MG tablet TAKE 1 TABLET BY MOUTH EVERY DAY 90 tablet 3    quinapril (ACCUPRIL) 10 MG tablet TAKE 1 TABLET BY MOUTH EVERY DAY 90 tablet 3    Continuous Blood Gluc Sensor (FREESTYLE TIBURCIO 14 DAY SENSOR) MISC APPLY ONE SENSOR EVERY 14 DAYS 1 each 3    blood glucose test strips (ONE TOUCH ULTRA TEST) strip Use to test blood glucose levels up to 6 times per day. Diagnosis: E10.9 550 strip 1    glucagon 1 MG injection Inject 1 mg into the skin See Admin Instructions Follow package directions for low blood sugar. 1 kit 3    aspirin 81 MG tablet Take 81 mg by mouth nightly       Insulin Syringe-Needle U-100 31G X 5/16\" 0.5 ML MISC 1 each by Does not apply route 4 times daily 100 each 2    cetirizine (ZYRTEC) 10 MG tablet TAKE 1 TABLET BY MOUTH EVERY DAY (Patient not taking: Reported on 5/13/2021) 30 tablet 11     No current facility-administered medications for this visit.      Allergies   Allergen Reactions    Seasonal      Health Maintenance   Topic Date Due    Hepatitis C screen  Never done    HIV screen  Never done    Hepatitis B vaccine (1 of 3 - Risk 3-dose series) Never done    DTaP/Tdap/Td vaccine (1 - Tdap) Never done    Pneumococcal 0-64 years Vaccine (1 of 2 - PPSV23) 02/04/2020    Diabetic microalbuminuria test  11/04/2020    Lipid screen  11/04/2020    Diabetic retinal exam  06/24/2021    Diabetic foot exam  07/20/2021    A1C test (Diabetic or Prediabetic)  05/06/2022    Potassium monitoring  06/23/2022    Creatinine monitoring  06/23/2022    Flu vaccine  Completed    COVID-19 Vaccine  Completed    Hepatitis A vaccine  Aged Out    Hib vaccine  Aged Out    Meningococcal (ACWY) vaccine  Aged Out Objective:     Physical Exam  Constitutional:       General: He is not in acute distress. Appearance: He is well-developed. He is not diaphoretic. HENT:      Head: Normocephalic and atraumatic. Right Ear: External ear normal.      Left Ear: External ear normal.   Eyes:      Conjunctiva/sclera: Conjunctivae normal.   Neck:      Vascular: No JVD. Cardiovascular:      Rate and Rhythm: Normal rate and regular rhythm. Heart sounds: Normal heart sounds. Pulmonary:      Effort: Pulmonary effort is normal.      Breath sounds: Normal breath sounds. No wheezing or rales. Chest:      Chest wall: Tenderness present. Abdominal:      Tenderness: There is abdominal tenderness. Musculoskeletal:         General: No tenderness. Skin:     General: Skin is warm and dry. Coloration: Skin is not pale. Neurological:      Mental Status: He is alert and oriented to person, place, and time. /74   Pulse 83   Resp 16   Wt 200 lb (90.7 kg)   SpO2 97%   BMI 27.12 kg/m²     EKG sinus rhythm  Impression/Plan:  1. Chest pain, unspecified type    2.  Epigastric pain      Requested Prescriptions      No prescriptions requested or ordered in this encounter     Orders Placed This Encounter   Procedures    XR CHEST (2 VW)     Standing Status:   Future     Number of Occurrences:   1     Standing Expiration Date:   6/23/2022    Sedimentation Rate     Standing Status:   Future     Number of Occurrences:   1     Standing Expiration Date:   6/23/2022    CK     Standing Status:   Future     Number of Occurrences:   1     Standing Expiration Date:   6/23/2022    Comprehensive Metabolic Panel     Standing Status:   Future     Number of Occurrences:   1     Standing Expiration Date:   6/23/2022    CBC Auto Differential     Standing Status:   Future     Number of Occurrences:   1     Standing Expiration Date:   6/23/2022    Lipase     Standing Status:   Future     Number of Occurrences:   1 Standing Expiration Date:   6/23/2022    EKG 12 Lead     Order Specific Question:   Reason for Exam?     Answer:   Chest pain       Patient giveneducational materials - see patient instructions. Discussed use, benefit, and side effects of prescribed medications. All patient questions answered. Pt voiced understanding. Reviewed health maintenance. Patient agreedwith treatment plan. Follow up as directed. **This report has been created using voice recognition software. It may contain minor errorswhich are inherent in voice recognition technology. **       Electronically signed by Tom Hanson MD on 6/27/2021 at 2:26 PM

## 2021-06-28 ENCOUNTER — TELEPHONE (OUTPATIENT)
Dept: FAMILY MEDICINE CLINIC | Age: 51
End: 2021-06-28

## 2021-06-28 NOTE — TELEPHONE ENCOUNTER
----- Message from Chris Valles MD sent at 6/28/2021  8:16 AM EDT -----  Other than blood sugar being elevated labs are normal

## 2021-06-30 ENCOUNTER — OFFICE VISIT (OUTPATIENT)
Dept: INTERNAL MEDICINE CLINIC | Age: 51
End: 2021-06-30
Payer: COMMERCIAL

## 2021-06-30 VITALS
BODY MASS INDEX: 26.51 KG/M2 | DIASTOLIC BLOOD PRESSURE: 71 MMHG | TEMPERATURE: 97.2 F | SYSTOLIC BLOOD PRESSURE: 102 MMHG | HEIGHT: 73 IN | HEART RATE: 82 BPM | WEIGHT: 200 LBS

## 2021-06-30 DIAGNOSIS — E10.9 WELL CONTROLLED TYPE 1 DIABETES MELLITUS (HCC): Primary | ICD-10-CM

## 2021-06-30 DIAGNOSIS — E10.3493: ICD-10-CM

## 2021-06-30 DIAGNOSIS — Z96.41 INSULIN PUMP IN PLACE: ICD-10-CM

## 2021-06-30 PROCEDURE — 99214 OFFICE O/P EST MOD 30 MIN: CPT | Performed by: INTERNAL MEDICINE

## 2021-06-30 RX ORDER — QUINAPRIL 10 MG/1
TABLET ORAL
Qty: 90 TABLET | Refills: 3 | Status: SHIPPED | OUTPATIENT
Start: 2021-06-30 | End: 2022-07-13

## 2021-06-30 RX ORDER — ATORVASTATIN CALCIUM 10 MG/1
TABLET, FILM COATED ORAL
Qty: 90 TABLET | Refills: 3 | Status: SHIPPED | OUTPATIENT
Start: 2021-06-30 | End: 2022-07-13

## 2021-06-30 NOTE — PROGRESS NOTES
[facility] PROFESSIONAL SERVS  PHYSICIANS [place] [address] Chris [address]ia [address] [place], [place]  Dept: [phone]  Dept Fax: [phone]      Chief Complaint   Patient presents with    Other     patient wants Dexcom        Patient presents for evaluation of diabetes. I saw him 8 months ago  He saw Artist Isaac 1 year ago this patient is followed regularly by Dr. Jelly Jones. Used to see Dr Mirella Bateman  Now sees Yuliana Jimenez in the diabetic clinic  He has been diabetic for 39 years. He is on an insulin pump for the last 4 years  He has had 3 retinal detachment, loss of sight in his right eye.   He does have the [employer] system    Past Medical History:   Diagnosis Date    Anxiety     Detached retina 08/2018    right    Diabetic retinopathy ([address]     bilat, has had laser and injections    Ganglion cyst     Heart burn     Hyperlipidemia     Hypertension     Insulin pump in place     Neuropathy     Pneumonia     at age 26   [facility] Retinal detachment     RIGHT    Sleep apnea     has Cpap at home but does not wear like suppose to    Type I (juvenile type) diabetes mellitus without mention of complication, not stated as uncontrolled     12/1982    Wears glasses     usually uses contact lenses       Current Outpatient Medications   Medication Sig Dispense Refill    atorvastatin (LIPITOR) 10 MG tablet TAKE 1 TABLET BY MOUTH EVERY DAY 90 tablet 3    quinapril (ACCUPRIL) 10 MG tablet [id number] 90 tablet 3    pantoprazole (PROTONIX) 40 MG tablet TAKE 1 TABLET BY MOUTH EVERY DAY 90 tablet 2    latanoprost (XALATAN) 0.005 % ophthalmic solution Apply 1 drop to eye nightly      dorzolamide-timolol (COSOPT) 22.3-6.8 MG/ML ophthalmic solution Place 1 drop into the right eye 2 times daily      difluprednate (DUREZOL) 0.05 % EMUL Place 1 drop into the left eye 2 times daily      Sodium Chloride, Hypertonic, (ADÁN 128 OP) Apply 1 drop to eye 2 times daily Right eye      escitalopram (LEXAPRO) 20 MG tablet TAKE 1 TABLET BY MOUTH EVERY DAY 90 tablet 3    insulin aspart (NOVOLOG) 100 UNIT/ML injection vial PATIENT IS USING A MAX OF 80 UNITS PER DAY. 80 mL 1    Continuous Blood Gluc Sensor (FREESTYLE TIBURCIO 14 DAY SENSOR) MISC 1 sensor every 14 days (2 per month) 2 each 11    Continuous Blood Gluc Sensor (FREESTYLE TIBURCIO 14 DAY SENSOR) MISC APPLY ONE SENSOR EVERY 14 DAYS 1 each 3    cetirizine (ZYRTEC) 10 MG tablet TAKE 1 TABLET BY MOUTH EVERY DAY (Patient not taking: Reported on 5/13/2021) 30 tablet 11    blood glucose test strips (ONE TOUCH ULTRA TEST) strip Use to test blood glucose levels up to 6 times per day. Diagnosis: E10.9 550 strip 1    glucagon 1 MG injection Inject 1 mg into the skin See Admin Instructions Follow package directions for low blood sugar. 1 kit 3    aspirin 81 MG tablet Take 81 mg by mouth nightly       Insulin Syringe-Needle U-100 31G X 5/16\" 0.5 ML MISC 1 each by Does not apply route 4 times daily 100 each 2     No current facility-administered medications for this visit. Past Surgical History:   Procedure Laterality Date    CYST REMOVAL  1992    rt foot, ganglion    EYE SURGERY      has had laser to both eyes in office    [place] OFFICE/OUTPT VISIT,PROCEDURE ONLY Right 8/15/2018    VITRECTOMY 23 GAUGE, MEMBRANE PEELING, AIR FLUID EXCHANGE, AIR GAS EXCHANGE WITH 16% C3F8, ENDOLASER, 200 MSECONDS, 200 MWATTS, 809 PULSES performed by Kellie Sanchez MD at [address] OFFICE/OUTPT VISIT,PROCEDURE ONLY Right 9/19/2018    VITRECTOMY 23 GAUGE WITH SCLERAL BUCKLE, SILICONE OIL INSERTION, ENDOLASER, 200 MWATTS, 200 MSECONDS, 312 PULSES.  performed by Kellie Sanchez MD at [address] [place] [address]  2016    has never had lasik eye surgery    VITRECTOMY Right 08/15/2018    membrane peeling, air gas exchange    VITRECTOMY Right 08/15/2018    Dr Gasper Orlando    VITRECTOMY Right 01/35/8352    laser silicone oil injection    WISDOM TOOTH EXTRACTION  2005       Allergies   Allergen Reactions    Seasonal        Social History     Socioeconomic History    Marital status:      Spouse name: Not on file    Number of children: Not on file    Years of education: Not on file    Highest education level: Not on file   Occupational History    Not on file   Tobacco Use    Smoking status: Former Smoker     Packs/day: 0.25     Years: 15.00     Pack years: 3.75     Quit date: 2002     Years since quittin.4    Smokeless tobacco: Current User     Types: Chew   Vaping Use    Vaping Use: Never used   Substance and Sexual Activity    Alcohol use: Yes     Alcohol/week: 3.0 standard drinks     Types: 3 Cans of beer per week     Comment: daily    Drug use: No    Sexual activity: Not on file   Other Topics Concern    Not on file   Social History Narrative    Not on file     Social Determinants of Health     Financial Resource Strain: Low Risk     Difficulty of Paying Living Expenses: Not hard at all   Food Insecurity: No Food Insecurity    Worried About [address] in the Last Year: Never true    [facility] in the Last Year: Never true   Transportation Needs:     Lack of Transportation (Medical):      Lack of Transportation (Non-Medical):    Physical Activity:     Days of Exercise per Week:     Minutes of Exercise per Session:    Stress:     Feeling of Stress :    Social Connections:     Frequency of Communication with Friends and Family:     Frequency of Social Gatherings with Friends and Family:     Attends [employer] Services:     Active Member of Clubs or Organizations:     Attends Club or Organization Meetings:     Marital Status:    Intimate Partner Violence:     Fear of Current or Ex-Partner:     Emotionally Abused:     Physically Abused:     Sexually Abused:    2 children  Works at [employer] for eye issues  Family History   Problem Relation Age of Onset    Heart Disease Father     Asthma Father     High Blood Pressure Father        Review of Systems - General ROS: Blind in right eye  Psychological ROS: negative  Hematological and Lymphatic ROS: negative  Respiratory ROS: no cough, shortness of breath, or wheezing  Cardiovascular ROS: no chest pain or dyspnea on exertion  Gastrointestinal ROS: no abdominal pain, change in bowel habits, or black or bloody stools  Genito-Urinary ROS: no dysuria, trouble voiding, or hematuria  Musculoskeletal ROS: negative  Neurological ROS: no TIA or stroke symptoms  Dermatological ROS: negative    Blood pressure 102/71, pulse 82, temperature 97.2 °F (36.2 °C), height 6' 1\" (1.854 m), weight 200 lb (90.7 kg). Physical Examination: General appearance - alert, well appearing, and in no distress  HEENT-head normocephalic, atraumatic  Mental status - alert, oriented to person, place, and time  Neck - supple, no significant adenopathy  Chest - clear to auscultation, no wheezes, rales or rhonchi, symmetric air entry  Heart - normal rate, regular rhythm, normal S1, S2, no murmurs, rubs, clicks or gallops  Abdomen - soft, nontender, nondistended, no masses or organomegaly  Neurological - alert, oriented, normal speech, no focal findings or movement disorder noted  Musculoskeletal - no joint tenderness, deformity or swelling  Extremities - peripheral pulses normal, no pedal edema, no clubbing or cyanosis  Skin - normal coloration and turgor, no rashes, no suspicious skin lesions noted        Diagnosis Orders   1. Well controlled type 1 diabetes mellitus ([address]  Microalbumin / Creatinine Urine Ratio   2. Insulin pump in place     3. Severe nonproliferative diabetic retinopathy of both eyes without macular edema associated with type 1 diabetes mellitus ([address]         Orders Placed This Encounter   Procedures    Microalbumin / Creatinine Urine Ratio     Standing Status:   Future     Standing Expiration Date:   6/30/2022     Extensive counseling was done regarding diabetes.  The goals are to decrease or prevent the complications of diabetes and improve survival. The following goals were discussed  HgbA1c < 7 (providing no hypoglycemia)    BMI  18-25    BP < 130/80    STATIN(medium or high risk)    DIET <20% protein  < 30% fat, no trans fats, calorie restricted if BMI high    URINE MICROALBUMIN/CREATINIE  < 30     DILATED EYE EXAM EVERY 1-2 YEARS    MONITOR FEET FOR SORES, NEUROPATHY, ETC    REGULAR DENTAL CARE   HgBA1C in May was  6.8  Patient says he is going to get the closed-loop system through the diabetic clinic  She will look into it  I will tentatively see him in 6 months

## 2021-07-10 DIAGNOSIS — E10.9 WELL CONTROLLED TYPE 1 DIABETES MELLITUS (HCC): ICD-10-CM

## 2021-07-12 DIAGNOSIS — E10.65 TYPE 1 DIABETES MELLITUS WITH HYPERGLYCEMIA (HCC): ICD-10-CM

## 2021-07-12 RX ORDER — FLASH GLUCOSE SENSOR
KIT MISCELLANEOUS
Qty: 2 EACH | Refills: 11 | Status: SHIPPED | OUTPATIENT
Start: 2021-07-12 | End: 2022-04-20

## 2021-07-12 RX ORDER — FLASH GLUCOSE SENSOR
KIT MISCELLANEOUS
Qty: 1 EACH | Refills: 3 | Status: SHIPPED | OUTPATIENT
Start: 2021-07-12 | End: 2022-04-20

## 2021-07-17 ENCOUNTER — HOSPITAL ENCOUNTER (OUTPATIENT)
Age: 51
Discharge: HOME | End: 2021-07-17
Payer: COMMERCIAL

## 2021-07-17 DIAGNOSIS — E10.9 WELL CONTROLLED TYPE 1 DIABETES MELLITUS (HCC): ICD-10-CM

## 2021-07-21 ENCOUNTER — OFFICE VISIT (OUTPATIENT)
Dept: INTERNAL MEDICINE CLINIC | Age: 51
End: 2021-07-21
Payer: COMMERCIAL

## 2021-07-21 VITALS
DIASTOLIC BLOOD PRESSURE: 78 MMHG | SYSTOLIC BLOOD PRESSURE: 126 MMHG | WEIGHT: 202.6 LBS | HEART RATE: 75 BPM | HEIGHT: 72 IN | BODY MASS INDEX: 27.44 KG/M2 | TEMPERATURE: 97.5 F

## 2021-07-21 DIAGNOSIS — E10.8 CONTROLLED DIABETES MELLITUS TYPE 1 WITH COMPLICATIONS (HCC): ICD-10-CM

## 2021-07-21 PROCEDURE — G0108 DIAB MANAGE TRN  PER INDIV: HCPCS | Performed by: INTERNAL MEDICINE

## 2021-07-21 RX ORDER — PREDNISOLONE ACETATE 10 MG/ML
1 SUSPENSION/ DROPS OPHTHALMIC 2 TIMES DAILY
COMMUNITY
Start: 2021-06-26

## 2021-07-21 NOTE — PATIENT INSTRUCTIONS
Continue with insulin pump Tandem X2  Dexcom readings will feed to your Tandem pump           Control IQ will help regulate glucose levels hourly   -you can use sleep mode if glucose levels are rising while you sleep   -you can use exercise mode for exercise routines.

## 2021-07-21 NOTE — PROGRESS NOTES
[facility]  [address] [place], [address]  [phone] (phone)  [phone] (fax)    Patient ID: Stephy Banda [id number]  Referring Provider: Dr. Yen Vaz     Patient's name and  were verified. Subjective:    He presents for His follow-up diabetic visit. He has type 1 diabetes mellitus. Home regimen includes: Insulin He is compliant most of the time. Assessment:     Lab Results   Component Value Date    LABA1C 6.8 2021    BUN 17 2021    CREATININE 0.9 2021     There were no vitals filed for this visit. Wt Readings from Last 3 Encounters:   21 200 lb (90.7 kg)   21 200 lb (90.7 kg)   21 201 lb 6.4 oz (91.4 kg)     Ht Readings from Last 3 Encounters:   21 6' 1\" (1.854 m)   21 6' (1.829 m)   21 6' 1.25\" (1.861 m)       Glucose at 3 hrs PPD today resulted at 138mg/dl  Current monitoring regimen: home blood tests - 4+ times daily; [employer] CGM  Home blood sugar trends:   Any episodes of hypoglycemia? yes - 2-3am  Depression screening completed 21  Previous visit with dietician: yes - 2017  Current diet: carb counting  Current exercise: walking 12,000-14,000 steps per day. P90X 50 m ins 2-3 days per week  Eye exam current (within one year): yes Dr. Yolis Carrizales; [facility]                           2021-stable left eye. Blind right eye  Any history of foot problems? no  Last foot exam: 2021 Pedal pulses:   peripheral pulses symmetrical   Results of monofilament test: 10/10              Skin noted to be warm  Immunizations up to date: yes -   Taking ASA:  Yes   Appropriate for use of MyChart Glucose Grid:  Yes    Focus:     Diabetes education. Recent A1C 6.8%. He presents today with new Tandem X2 meter and [employer] CGM system. He has not yet started his new Dexcom CGM. Instructions given for Dexcom CGM given. Dexcom sensor inserted Right lower abdomen. Connection made with Tandem pump as  and initiated Control IQ.

## 2021-07-23 ENCOUNTER — HOSPITAL ENCOUNTER (EMERGENCY)
Age: 51
Discharge: HOME | End: 2021-07-23
Attending: NURSE PRACTITIONER
Payer: COMMERCIAL

## 2021-07-23 VITALS
OXYGEN SATURATION: 97 % | WEIGHT: 200 LBS | SYSTOLIC BLOOD PRESSURE: 134 MMHG | DIASTOLIC BLOOD PRESSURE: 70 MMHG | RESPIRATION RATE: 16 BRPM | BODY MASS INDEX: 27.09 KG/M2 | TEMPERATURE: 97.8 F | HEART RATE: 83 BPM | HEIGHT: 72 IN

## 2021-07-23 DIAGNOSIS — H60.391 INFECTIVE OTITIS EXTERNA OF RIGHT EAR: Primary | ICD-10-CM

## 2021-07-23 PROCEDURE — 99213 OFFICE O/P EST LOW 20 MIN: CPT | Performed by: NURSE PRACTITIONER

## 2021-07-23 PROCEDURE — 99213 OFFICE O/P EST LOW 20 MIN: CPT

## 2021-07-23 RX ORDER — CIPROFLOXACIN AND DEXAMETHASONE 3; 1 MG/ML; MG/ML
4 SUSPENSION/ DROPS AURICULAR (OTIC) 2 TIMES DAILY
Qty: 7.5 ML | Refills: 0 | Status: SHIPPED | OUTPATIENT
Start: 2021-07-23 | End: 2021-07-30

## 2021-07-23 ASSESSMENT — PAIN DESCRIPTION - ORIENTATION: ORIENTATION: RIGHT

## 2021-07-23 ASSESSMENT — ENCOUNTER SYMPTOMS
VOMITING: 0
COUGH: 0
SHORTNESS OF BREATH: 0
CHEST TIGHTNESS: 0
NAUSEA: 0
DIARRHEA: 0
SORE THROAT: 0
RHINORRHEA: 0

## 2021-07-23 ASSESSMENT — PAIN SCALES - GENERAL: PAINLEVEL_OUTOF10: 5

## 2021-07-23 ASSESSMENT — PAIN DESCRIPTION - LOCATION: LOCATION: EAR

## 2021-07-23 NOTE — ED NOTES
Pt. Released in stable condition, ambulated per self to private car. Instructed pt to follow-up with family doctor as needed for recheck or go directly to the emergency department for any concerns/worsening conditions. Pt. Verbalized understanding of instructions. No questions at this time. RX in hand.       Toni Chua RN  07/23/21 [id number]

## 2021-07-23 NOTE — ED PROVIDER NOTES
[facility]  Urgent Care Encounter       CHIEF COMPLAINT       Chief Complaint   Patient presents with    Ear Problem       Nurses Notes reviewed and I agree except as noted in the HPI. HISTORY OF PRESENT ILLNESS   Leon Contreras is a 53 y.o. male who presents to the [facility] urgent care for evaluation of right ear pain. He reports this pain started yesterday. He reports he does have history of multiple ear infections where he was on oral antibiotics and drops. He reports using Cipro drops this morning without relief. The history is provided by the patient. No  was used. REVIEW OF SYSTEMS     Review of Systems   Constitutional: Negative for activity change, appetite change, chills, fatigue and fever. HENT: Positive for ear pain. Negative for ear discharge, rhinorrhea and sore throat. Respiratory: Negative for cough, chest tightness and shortness of breath. Cardiovascular: Negative for chest pain. Gastrointestinal: Negative for diarrhea, nausea and vomiting. Genitourinary: Negative for dysuria. Skin: Negative for rash. Allergic/Immunologic: Negative for environmental allergies and food allergies. Neurological: Negative for dizziness and headaches.        PAST MEDICAL HISTORY         Diagnosis Date    Anxiety     Detached retina 08/2018    right    Diabetic retinopathy ([address]     bilat, has had laser and injections    Ganglion cyst     Heart burn     Hyperlipidemia     Hypertension     Insulin pump in place     Neuropathy     Pneumonia     at age 26   Jannell Outhouse Retinal detachment     RIGHT    Sleep apnea     has Cpap at home but does not wear like suppose to    Type I (juvenile type) diabetes mellitus without mention of complication, not stated as uncontrolled     12/1982    Wears glasses     usually uses contact lenses       SURGICALHISTORY     Patient  has a past surgical history that includes [place] tooth extraction (2005); cyst removal (1992); Refractive surgery (2016); eye surgery; vitrectomy (Right, 08/15/2018); pr office/outpt visit,procedure only (Right, 8/15/2018); vitrectomy (Right, 08/15/2018); vitrectomy (Right, 09/19/2018); and pr office/outpt visit,procedure only (Right, 9/19/2018). CURRENT MEDICATIONS       Previous Medications    ASPIRIN 81 MG TABLET    Take 81 mg by mouth nightly     ATORVASTATIN (LIPITOR) 10 MG TABLET    TAKE 1 TABLET BY MOUTH EVERY DAY    BLOOD GLUCOSE TEST STRIPS (ONE TOUCH ULTRA TEST) STRIP    Use to test blood glucose levels up to 6 times per day. Diagnosis: E10.9    CETIRIZINE (ZYRTEC) 10 MG TABLET    TAKE 1 TABLET BY MOUTH EVERY DAY    CONTINUOUS BLOOD GLUC SENSOR (FREESTYLE TIBURCIO 14 DAY SENSOR) MISC    1 sensor every 14 days (2 per month)    CONTINUOUS BLOOD GLUC SENSOR (FREESTYLE TIBURCIO 14 DAY SENSOR) MISC    Change every 14 days    DIFLUPREDNATE (DUREZOL) 0.05 % EMUL    Place 1 drop into the left eye 2 times daily    DORZOLAMIDE-TIMOLOL (COSOPT) 22.3-6.8 MG/ML OPHTHALMIC SOLUTION    Place 1 drop into the right eye 2 times daily    ESCITALOPRAM (LEXAPRO) 20 MG TABLET    TAKE 1 TABLET BY MOUTH EVERY DAY    GLUCAGON 1 MG INJECTION    Inject 1 mg into the skin See Admin Instructions Follow package directions for low blood sugar. INSULIN ASPART (NOVOLOG) 100 UNIT/ML INJECTION VIAL    PATIENT IS USING A MAX OF 80 UNITS PER DAY.     INSULIN SYRINGE-NEEDLE U-100 31G X 5/16\" 0.5 ML MISC    1 each by Does not apply route 4 times daily    LATANOPROST (XALATAN) 0.005 % OPHTHALMIC SOLUTION    Apply 1 drop to eye nightly    PANTOPRAZOLE (PROTONIX) 40 MG TABLET    TAKE 1 TABLET BY MOUTH EVERY DAY    PREDNISOLONE ACETATE (PRED FORTE) 1 % OPHTHALMIC SUSPENSION    Place 1 drop into both eyes 2 times daily    QUINAPRIL (ACCUPRIL) 10 MG TABLET    [id number]    SODIUM CHLORIDE, HYPERTONIC, (ADÁN 128 OP)    Apply 1 drop to eye 2 times daily Right eye       ALLERGIES     Patient is is allergic to seasonal.    Patients Immunization History   Administered Date(s) Administered    COVID-19, Pfizer, PF, 30mcg/0.3mL 03/11/2021, 04/01/2021    Influenza Vaccine, unspecified formulation 12/29/2017    Influenza Virus Vaccine 12/11/2013, 10/22/2014, 10/23/2015    Influenza, Rivera Irizarrying, IM, (6 mo and older Fluzone, Flulaval, Fluarix and 3 yrs and older Afluria) 11/10/2016, 11/14/2017, 10/05/2018    Influenza, Rivera Irizarrying, IM, PF (6 mo and older Fluzone, Flulaval, Fluarix, and 3 yrs and older Afluria) 11/01/2016, 12/29/2017, 10/15/2019, 10/16/2020    Pneumococcal Conjugate 13-valent Abril Cough) 12/10/2019       FAMILY HISTORY     Patient's family history includes Asthma in his father; Heart Disease in his father; High Blood Pressure in his father. SOCIAL HISTORY     Patient  reports that he quit smoking about 19 years ago. He has a 3.75 pack-year smoking history. His smokeless tobacco use includes chew. He reports current alcohol use of about 3.0 standard drinks of alcohol per week. He reports that he does not use drugs. PHYSICAL EXAM     ED TRIAGE VITALS  BP: 134/70, Temp: 97.8 °F (36.6 °C), Pulse: 83, Resp: 16, SpO2: 97 %,Estimated body mass index is 27.12 kg/m² as calculated from the following:    Height as of this encounter: 6' (1.829 m). Weight as of this encounter: 200 lb (90.7 kg). ,No LMP for male patient. Physical Exam  Vitals and nursing note reviewed. Constitutional:       General: He is not in acute distress. Appearance: Normal appearance. He is not ill-appearing, toxic-appearing or diaphoretic. HENT:      Head: Normocephalic. Right Ear: External ear normal. Swelling and tenderness present. Left Ear: Ear canal and external ear normal.      Nose: Nose normal. No congestion or rhinorrhea. Mouth/Throat:      Mouth: Mucous membranes are moist.      Pharynx: Oropharynx is clear. No oropharyngeal exudate or posterior oropharyngeal erythema. Cardiovascular:      Rate and Rhythm: Normal rate.       Pulses: Normal pulses. Pulmonary:      Effort: Pulmonary effort is normal. No respiratory distress. Breath sounds: No stridor. No wheezing or rhonchi. Abdominal:      General: Abdomen is flat. Bowel sounds are normal.      Palpations: Abdomen is soft. Musculoskeletal:         General: No swelling or tenderness. Normal range of motion. Cervical back: Normal range of motion. Neurological:      General: No focal deficit present. Mental Status: He is alert and oriented to person, place, and time. Psychiatric:         Mood and Affect: Mood normal.         Behavior: Behavior normal.         DIAGNOSTIC RESULTS     Labs:No results found for this visit on 07/23/21. IMAGING:    No orders to display         EKG: None      URGENT CARE COURSE:     Vitals:    07/23/21 1440   BP: 134/70   Pulse: 83   Resp: 16   Temp: 97.8 °F (36.6 °C)   TempSrc: Infrared   SpO2: 97%   Weight: 200 lb (90.7 kg)   Height: 6' (1.829 m)       Medications - No data to display         PROCEDURES:  None    FINAL IMPRESSION      1. Infective otitis externa of right ear          DISPOSITION/ PLAN     Patient seen and evaluated for right ear pain. Assessment consistent with infective otitis externa of the right ear. He is noted to have erythema and edema of canal.  He is prescribed Cipro eardrops. He is instructed to follow-up with PCP in 3 to 5 days with new or worsening symptoms. He is agreeable with the above plan and denies questions or concerns at this time.       PATIENT REFERRED TO:  Chana Choudhury MD  [address] [place] [address] / [address] [facility]      DISCHARGE MEDICATIONS:  New Prescriptions    CIPROFLOXACIN-DEXAMETHASONE (CIPRODEX) 0.3-0.1 % OTIC SUSPENSION    Place 4 drops into the right ear 2 times daily for 7 days       Discontinued Medications    No medications on file       Current Discharge Medication List          JESÚS Du - CNP    (Please note that portions of this note were completed with a voice recognition program. Efforts were made to edit the dictations but occasionally words are mis-transcribed.)           Grant Hammond, JESÚS - RUSS  07/23/21 4217

## 2021-07-26 ENCOUNTER — TELEPHONE (OUTPATIENT)
Dept: FAMILY MEDICINE CLINIC | Age: 51
End: 2021-07-26

## 2021-07-26 NOTE — TELEPHONE ENCOUNTER
----- Message from Angelicadebora Jeanette sent at 7/23/2021  2:05 PM EDT -----  Subject: Appointment Request    Reason for Call: Ear Problem    QUESTIONS  Type of Appointment? Established Patient  Reason for appointment request? No appointments available during search  Additional Information for Provider? Pt is experiencing pain in his R ear   since yesterday, there's no availability . Please call to schedule.  ---------------------------------------------------------------------------  --------------  CALL BACK INFO  What is the best way for the office to contact you? OK to leave message on   voicemail  Preferred Call Back Phone Number? [phone]  ---------------------------------------------------------------------------  --------------  SCRIPT ANSWERS  Relationship to Patient? Self  Appointment reason? Symptomatic  Select script based on patient symptoms? Adult Ear/Hearing Problem  Did you have an injury or trauma within the past three days? No  Is your pain affecting your daily activities? No  Are you having fevers (100.4), chills or sweats? No  Are you experiencing new onset hearing loss? No  Did your symptoms begin within the last 2 weeks? Yes  Have you been diagnosed with, awaiting test results for, or told that you   are suspected of having COVID-19 (Coronavirus)? (If patient has tested   negative or was tested as a requirement for work, school, or travel and   not based on symptoms, answer no)? No  Do you currently have flu-like symptoms including fever or chills, cough,   shortness of breath, difficulty breathing, or new loss of taste or smell? No  Have you had close contact with someone with COVID-19 in the last 14 days? No  (Service Expert  click yes below to proceed with "[employer]" As Usual   Scheduling)?  Yes

## 2021-08-16 RX ORDER — ESCITALOPRAM OXALATE 20 MG/1
TABLET ORAL
Qty: 90 TABLET | Refills: 2 | Status: SHIPPED | OUTPATIENT
Start: 2021-08-16 | End: 2022-06-22

## 2021-08-18 RX ORDER — ESCITALOPRAM OXALATE 20 MG/1
TABLET ORAL
Qty: 90 TABLET | Refills: 2 | OUTPATIENT
Start: 2021-08-18

## 2021-09-14 ENCOUNTER — OFFICE VISIT (OUTPATIENT)
Dept: INTERNAL MEDICINE CLINIC | Age: 51
End: 2021-09-14
Payer: COMMERCIAL

## 2021-09-14 DIAGNOSIS — E10.65 TYPE 1 DIABETES MELLITUS WITH HYPERGLYCEMIA (HCC): Primary | ICD-10-CM

## 2021-09-14 LAB — HBA1C MFR BLD: 6.3 % (ref 4.3–5.7)

## 2021-09-14 PROCEDURE — 90674 CCIIV4 VAC NO PRSV 0.5 ML IM: CPT | Performed by: INTERNAL MEDICINE

## 2021-09-14 PROCEDURE — 90471 IMMUNIZATION ADMIN: CPT | Performed by: INTERNAL MEDICINE

## 2021-09-14 PROCEDURE — 83036 HEMOGLOBIN GLYCOSYLATED A1C: CPT | Performed by: INTERNAL MEDICINE

## 2021-09-14 PROCEDURE — G0108 DIAB MANAGE TRN  PER INDIV: HCPCS | Performed by: INTERNAL MEDICINE

## 2021-09-14 NOTE — PROGRESS NOTES
[facility]  [address] [place] Tran, [address]  [phone] (phone)  [phone] (fax)    Patient ID: Evelin Dee 1970  Referring Provider: Dr. Es Fuchs     Patient's name and  were verified. Subjective:    He presents for His follow-up diabetic visit. He has type 1 diabetes mellitus. Home regimen includes: Insulin He is compliant most of the time. Assessment:     Lab Results   Component Value Date    LABA1C 6.8 2021    BUN 17 2021    CREATININE 0.9 2021     There were no vitals filed for this visit. Wt Readings from Last 3 Encounters:   21 200 lb (90.7 kg)   21 202 lb 9.6 oz (91.9 kg)   21 200 lb (90.7 kg)     Ht Readings from Last 3 Encounters:   21 6' (1.829 m)   21 6' (1.829 m)   21 6' 1\" (1.854 m)       Diabetes pharmacotherapy:  Tandem - Control IQ    Fasting BG today resulted at 189 mg/dl  Current monitoring regimen: Dexcom  Home blood sugar trends: ; Av   -Post-lunch hyperglycemia >2 hours    -Frequent lower blood sugars before dinner (Control IQ prevents more severe lows)  Any episodes of hypoglycemia? no  Previous visit with dietician: yes - remote  Current diet: B: skipping            L: 1p - sandwich or smoothie periodically                       D: 6-7p - meat & potatoes                        Snacks: only for lows - treats with V8 splash                       Beverages: 6-10 diet sodas, some water  Current exercise: walking - 10,000 steps/day  Eye exam current (within one year): yes  - Dr. Billie Tuttle 2021; blind in right eye  Any history of foot problems? no  Last foot exam: 2021  Immunizations up to date: No - due for flu shot; refuses COVID vaccination  Taking ASA:  Yes   Appropriate for use of MyChart Glucose Grid:  Yes    Focus:   Diabetes Education. A1C today decreased to 6.3% (previously 6.8%). [place] is having no issues with the transition to Tandem Control-IQ.  He denies any significant lows, although the Control-IQ frequently decreases/stops the basal rate in the afternoon. Also, Dexter Hawkins is having post-prandial highs lasting >2 hours. Isacc exercises frequently and has success using exercise mode to prevent lows. Regarding lifestyle factors, encouraged increased water drinking to protect the kidneys. DSME PLAN:   Discussed general issues about diabetes pathophysiology and management. Counseling at today's visit: discussed management of hypoglycemic episodes and hydration. Discussed ways to avoid symptomatic hypoglycemia. 1. Try to incorporate more water & caffeine free beverages to improve your hydration & protect your kidneys. 2. Reach out if you have any issues with more frequent highs or lows while using Control IQ  3. Keep using exercise mode on busy/more active days to avoid lows  4. Tandem pump adjustments:   -Decrease 9am basal rate from 0.9 --> 0.85   -Strengthen 9am carb ratio from 10.0 --> 9.5    Immunizations Administered     Name Date Dose Route    Influenza, MDCK Quadv, IM, PF (Flucelvax 2 yrs and older) 9/14/2021 0.5 mL Intramuscular    Site: Deltoid- Left    Lot: [id number]    NDC: 87504-968-69        Meter download, medications, PMH and nursing assessment reviewed. Angela Gomez states He is willing to participate in this plan of care and verbalized understanding of all instructions provided. Teach back used to verify comprehension. Total time involved in direct patient education: 30 minutes. Follow-up in 3 months with [facility] RN. For [facility] in place:   Yes   Intervention Detail: Dose Adjustment: 1, reason: Therapy Optimization and Vaccine Recommended/Administered   Total # of Interventions Recommended: 2   Total # of Interventions Accepted: 2   Time Spent (min): [address] (Stefan Sousa) Ace Sanders, PharmD, [facility]  Internal Medicine Clinical Pharmacist  [phone]

## 2021-09-15 VITALS
TEMPERATURE: 97.1 F | WEIGHT: 200.2 LBS | BODY MASS INDEX: 27.15 KG/M2 | HEART RATE: 74 BPM | SYSTOLIC BLOOD PRESSURE: 128 MMHG | DIASTOLIC BLOOD PRESSURE: 78 MMHG

## 2021-11-02 DIAGNOSIS — E10.65 TYPE 1 DIABETES MELLITUS WITH HYPERGLYCEMIA (HCC): ICD-10-CM

## 2021-12-03 ENCOUNTER — ANESTHESIA (OUTPATIENT)
Dept: OPERATING ROOM | Age: 51
End: 2021-12-03
Payer: COMMERCIAL

## 2021-12-03 ENCOUNTER — ANESTHESIA EVENT (OUTPATIENT)
Dept: OPERATING ROOM | Age: 51
End: 2021-12-03
Payer: COMMERCIAL

## 2021-12-03 ENCOUNTER — APPOINTMENT (OUTPATIENT)
Dept: CT IMAGING | Age: 51
End: 2021-12-03
Payer: COMMERCIAL

## 2021-12-03 ENCOUNTER — HOSPITAL ENCOUNTER (OUTPATIENT)
Age: 51
Setting detail: OBSERVATION
Discharge: HOME | End: 2021-12-04
Attending: EMERGENCY MEDICINE | Admitting: SURGERY
Payer: COMMERCIAL

## 2021-12-03 VITALS
SYSTOLIC BLOOD PRESSURE: 111 MMHG | OXYGEN SATURATION: 74 % | RESPIRATION RATE: 10 BRPM | DIASTOLIC BLOOD PRESSURE: 71 MMHG | TEMPERATURE: 96.8 F

## 2021-12-03 DIAGNOSIS — K35.20 ACUTE APPENDICITIS WITH GENERALIZED PERITONITIS, WITHOUT GANGRENE OR ABSCESS, UNSPECIFIED WHETHER PERFORATION PRESENT: Primary | ICD-10-CM

## 2021-12-03 DIAGNOSIS — K35.30 ACUTE APPENDICITIS WITH LOCALIZED PERITONITIS, WITHOUT PERFORATION, ABSCESS, OR GANGRENE: ICD-10-CM

## 2021-12-03 PROBLEM — K35.80 ACUTE APPENDICITIS: Status: ACTIVE | Noted: 2021-12-03

## 2021-12-03 LAB
ANION GAP SERPL CALCULATED.3IONS-SCNC: 20 MEQ/L (ref 8–16)
BASOPHILS # BLD: 0.4 %
BASOPHILS ABSOLUTE: 0.1 THOU/MM3 (ref 0–0.1)
BUN BLDV-MCNC: 14 MG/DL (ref 7–22)
CALCIUM SERPL-MCNC: 9.7 MG/DL (ref 8.5–10.5)
CHLORIDE BLD-SCNC: 99 MEQ/L (ref 98–111)
CO2: 19 MEQ/L (ref 23–33)
CREAT SERPL-MCNC: 0.9 MG/DL (ref 0.4–1.2)
EOSINOPHIL # BLD: 0.2 %
EOSINOPHILS ABSOLUTE: 0 THOU/MM3 (ref 0–0.4)
ERYTHROCYTE [DISTWIDTH] IN BLOOD BY AUTOMATED COUNT: 12.9 % (ref 11.5–14.5)
ERYTHROCYTE [DISTWIDTH] IN BLOOD BY AUTOMATED COUNT: 39.4 FL (ref 35–45)
GFR SERPL CREATININE-BSD FRML MDRD: 89 ML/MIN/1.73M2
GLUCOSE BLD-MCNC: 165 MG/DL (ref 70–108)
GLUCOSE BLD-MCNC: 168 MG/DL (ref 70–108)
HCT VFR BLD CALC: 40.5 % (ref 42–52)
HCT VFR BLD CALC: 48.8 % (ref 42–52)
HEMOGLOBIN: 13.4 GM/DL (ref 14–18)
HEMOGLOBIN: 16.6 GM/DL (ref 14–18)
IMMATURE GRANS (ABS): 0.09 THOU/MM3 (ref 0–0.07)
IMMATURE GRANULOCYTES: 0.6 %
LYMPHOCYTES # BLD: 12.1 %
LYMPHOCYTES ABSOLUTE: 1.9 THOU/MM3 (ref 1–4.8)
MCH RBC QN AUTO: 29 PG (ref 26–33)
MCHC RBC AUTO-ENTMCNC: 34 GM/DL (ref 32.2–35.5)
MCV RBC AUTO: 85.2 FL (ref 80–94)
MONOCYTES # BLD: 8.8 %
MONOCYTES ABSOLUTE: 1.4 THOU/MM3 (ref 0.4–1.3)
NUCLEATED RED BLOOD CELLS: 0 /100 WBC
OSMOLALITY CALCULATION: 280 MOSMOL/KG (ref 275–300)
PLATELET # BLD: 291 THOU/MM3 (ref 130–400)
PMV BLD AUTO: 10.2 FL (ref 9.4–12.4)
POTASSIUM SERPL-SCNC: 4 MEQ/L (ref 3.5–5.2)
RBC # BLD: 5.73 MILL/MM3 (ref 4.7–6.1)
SEG NEUTROPHILS: 77.9 %
SEGMENTED NEUTROPHILS ABSOLUTE COUNT: 12.4 THOU/MM3 (ref 1.8–7.7)
SODIUM BLD-SCNC: 138 MEQ/L (ref 135–145)
WBC # BLD: 15.9 THOU/MM3 (ref 4.8–10.8)

## 2021-12-03 PROCEDURE — 2709999900 HC NON-CHARGEABLE SUPPLY: Performed by: SURGERY

## 2021-12-03 PROCEDURE — 85014 HEMATOCRIT: CPT

## 2021-12-03 PROCEDURE — 2580000003 HC RX 258: Performed by: SURGERY

## 2021-12-03 PROCEDURE — 96376 TX/PRO/DX INJ SAME DRUG ADON: CPT

## 2021-12-03 PROCEDURE — 6360000002 HC RX W HCPCS: Performed by: ANESTHESIOLOGY

## 2021-12-03 PROCEDURE — 3600000012 HC SURGERY LEVEL 2 ADDTL 15MIN: Performed by: SURGERY

## 2021-12-03 PROCEDURE — 6360000004 HC RX CONTRAST MEDICATION: Performed by: EMERGENCY MEDICINE

## 2021-12-03 PROCEDURE — 44970 LAPAROSCOPY APPENDECTOMY: CPT | Performed by: SURGERY

## 2021-12-03 PROCEDURE — 7100000000 HC PACU RECOVERY - FIRST 15 MIN: Performed by: SURGERY

## 2021-12-03 PROCEDURE — 6360000002 HC RX W HCPCS: Performed by: SURGERY

## 2021-12-03 PROCEDURE — 99283 EMERGENCY DEPT VISIT LOW MDM: CPT

## 2021-12-03 PROCEDURE — 85025 COMPLETE CBC W/AUTO DIFF WBC: CPT

## 2021-12-03 PROCEDURE — 3700000000 HC ANESTHESIA ATTENDED CARE: Performed by: SURGERY

## 2021-12-03 PROCEDURE — 3600000002 HC SURGERY LEVEL 2 BASE: Performed by: SURGERY

## 2021-12-03 PROCEDURE — 96374 THER/PROPH/DIAG INJ IV PUSH: CPT

## 2021-12-03 PROCEDURE — 7100000001 HC PACU RECOVERY - ADDTL 15 MIN: Performed by: SURGERY

## 2021-12-03 PROCEDURE — 3700000001 HC ADD 15 MINUTES (ANESTHESIA): Performed by: SURGERY

## 2021-12-03 PROCEDURE — 2500000003 HC RX 250 WO HCPCS: Performed by: NURSE ANESTHETIST, CERTIFIED REGISTERED

## 2021-12-03 PROCEDURE — 2720000010 HC SURG SUPPLY STERILE: Performed by: SURGERY

## 2021-12-03 PROCEDURE — G0378 HOSPITAL OBSERVATION PER HR: HCPCS

## 2021-12-03 PROCEDURE — 6370000000 HC RX 637 (ALT 250 FOR IP): Performed by: SURGERY

## 2021-12-03 PROCEDURE — 99219 PR INITIAL OBSERVATION CARE/DAY 50 MINUTES: CPT | Performed by: SURGERY

## 2021-12-03 PROCEDURE — 6360000002 HC RX W HCPCS: Performed by: STUDENT IN AN ORGANIZED HEALTH CARE EDUCATION/TRAINING PROGRAM

## 2021-12-03 PROCEDURE — APPSS180 APP SPLIT SHARED TIME > 60 MINUTES: Performed by: PHYSICIAN ASSISTANT

## 2021-12-03 PROCEDURE — 85018 HEMOGLOBIN: CPT

## 2021-12-03 PROCEDURE — 36415 COLL VENOUS BLD VENIPUNCTURE: CPT

## 2021-12-03 PROCEDURE — 74177 CT ABD & PELVIS W/CONTRAST: CPT

## 2021-12-03 PROCEDURE — 80048 BASIC METABOLIC PNL TOTAL CA: CPT

## 2021-12-03 PROCEDURE — 6360000002 HC RX W HCPCS: Performed by: NURSE ANESTHETIST, CERTIFIED REGISTERED

## 2021-12-03 PROCEDURE — 96361 HYDRATE IV INFUSION ADD-ON: CPT

## 2021-12-03 PROCEDURE — 2500000003 HC RX 250 WO HCPCS: Performed by: SURGERY

## 2021-12-03 PROCEDURE — 82948 REAGENT STRIP/BLOOD GLUCOSE: CPT

## 2021-12-03 PROCEDURE — 96375 TX/PRO/DX INJ NEW DRUG ADDON: CPT

## 2021-12-03 PROCEDURE — 88304 TISSUE EXAM BY PATHOLOGIST: CPT

## 2021-12-03 PROCEDURE — 2580000003 HC RX 258: Performed by: STUDENT IN AN ORGANIZED HEALTH CARE EDUCATION/TRAINING PROGRAM

## 2021-12-03 RX ORDER — SODIUM CHLORIDE 0.9 % (FLUSH) 0.9 %
5-40 SYRINGE (ML) INJECTION PRN
Status: DISCONTINUED | OUTPATIENT
Start: 2021-12-03 | End: 2021-12-04 | Stop reason: HOSPADM

## 2021-12-03 RX ORDER — LISINOPRIL 10 MG/1
10 TABLET ORAL DAILY
Status: DISCONTINUED | OUTPATIENT
Start: 2021-12-04 | End: 2021-12-04 | Stop reason: HOSPADM

## 2021-12-03 RX ORDER — FENTANYL CITRATE 50 UG/ML
50 INJECTION, SOLUTION INTRAMUSCULAR; INTRAVENOUS ONCE
Status: COMPLETED | OUTPATIENT
Start: 2021-12-03 | End: 2021-12-03

## 2021-12-03 RX ORDER — 0.9 % SODIUM CHLORIDE 0.9 %
1000 INTRAVENOUS SOLUTION INTRAVENOUS ONCE
Status: COMPLETED | OUTPATIENT
Start: 2021-12-03 | End: 2021-12-03

## 2021-12-03 RX ORDER — SODIUM CHLORIDE 9 MG/ML
25 INJECTION, SOLUTION INTRAVENOUS PRN
Status: DISCONTINUED | OUTPATIENT
Start: 2021-12-03 | End: 2021-12-04 | Stop reason: HOSPADM

## 2021-12-03 RX ORDER — ONDANSETRON 4 MG/1
4 TABLET, ORALLY DISINTEGRATING ORAL EVERY 8 HOURS PRN
Status: DISCONTINUED | OUTPATIENT
Start: 2021-12-03 | End: 2021-12-04 | Stop reason: HOSPADM

## 2021-12-03 RX ORDER — LABETALOL 20 MG/4 ML (5 MG/ML) INTRAVENOUS SYRINGE
5 EVERY 10 MIN PRN
Status: DISCONTINUED | OUTPATIENT
Start: 2021-12-03 | End: 2021-12-03 | Stop reason: HOSPADM

## 2021-12-03 RX ORDER — ESCITALOPRAM OXALATE 20 MG/1
20 TABLET ORAL DAILY
Status: DISCONTINUED | OUTPATIENT
Start: 2021-12-04 | End: 2021-12-04 | Stop reason: HOSPADM

## 2021-12-03 RX ORDER — PROPOFOL 10 MG/ML
INJECTION, EMULSION INTRAVENOUS PRN
Status: DISCONTINUED | OUTPATIENT
Start: 2021-12-03 | End: 2021-12-03 | Stop reason: SDUPTHER

## 2021-12-03 RX ORDER — MEPERIDINE HYDROCHLORIDE 25 MG/ML
12.5 INJECTION INTRAMUSCULAR; INTRAVENOUS; SUBCUTANEOUS EVERY 5 MIN PRN
Status: DISCONTINUED | OUTPATIENT
Start: 2021-12-03 | End: 2021-12-03 | Stop reason: HOSPADM

## 2021-12-03 RX ORDER — MORPHINE SULFATE 4 MG/ML
4 INJECTION, SOLUTION INTRAMUSCULAR; INTRAVENOUS ONCE
Status: COMPLETED | OUTPATIENT
Start: 2021-12-03 | End: 2021-12-03

## 2021-12-03 RX ORDER — LATANOPROST 50 UG/ML
1 SOLUTION/ DROPS OPHTHALMIC NIGHTLY
Status: DISCONTINUED | OUTPATIENT
Start: 2021-12-03 | End: 2021-12-04 | Stop reason: HOSPADM

## 2021-12-03 RX ORDER — MORPHINE SULFATE 4 MG/ML
4 INJECTION, SOLUTION INTRAMUSCULAR; INTRAVENOUS
Status: DISCONTINUED | OUTPATIENT
Start: 2021-12-03 | End: 2021-12-04 | Stop reason: HOSPADM

## 2021-12-03 RX ORDER — BUPIVACAINE HYDROCHLORIDE 5 MG/ML
INJECTION, SOLUTION EPIDURAL; INTRACAUDAL PRN
Status: DISCONTINUED | OUTPATIENT
Start: 2021-12-03 | End: 2021-12-03 | Stop reason: ALTCHOICE

## 2021-12-03 RX ORDER — ONDANSETRON 2 MG/ML
INJECTION INTRAMUSCULAR; INTRAVENOUS PRN
Status: DISCONTINUED | OUTPATIENT
Start: 2021-12-03 | End: 2021-12-03 | Stop reason: SDUPTHER

## 2021-12-03 RX ORDER — PANTOPRAZOLE SODIUM 40 MG/1
40 TABLET, DELAYED RELEASE ORAL DAILY
Status: DISCONTINUED | OUTPATIENT
Start: 2021-12-04 | End: 2021-12-04 | Stop reason: HOSPADM

## 2021-12-03 RX ORDER — MORPHINE SULFATE 2 MG/ML
2 INJECTION, SOLUTION INTRAMUSCULAR; INTRAVENOUS
Status: DISCONTINUED | OUTPATIENT
Start: 2021-12-03 | End: 2021-12-04 | Stop reason: HOSPADM

## 2021-12-03 RX ORDER — DEXAMETHASONE SODIUM PHOSPHATE 10 MG/ML
INJECTION, EMULSION INTRAMUSCULAR; INTRAVENOUS PRN
Status: DISCONTINUED | OUTPATIENT
Start: 2021-12-03 | End: 2021-12-03

## 2021-12-03 RX ORDER — ROCURONIUM BROMIDE 10 MG/ML
INJECTION, SOLUTION INTRAVENOUS PRN
Status: DISCONTINUED | OUTPATIENT
Start: 2021-12-03 | End: 2021-12-03 | Stop reason: SDUPTHER

## 2021-12-03 RX ORDER — ONDANSETRON 2 MG/ML
4 INJECTION INTRAMUSCULAR; INTRAVENOUS ONCE
Status: COMPLETED | OUTPATIENT
Start: 2021-12-03 | End: 2021-12-03

## 2021-12-03 RX ORDER — LIDOCAINE HYDROCHLORIDE 20 MG/ML
INJECTION, SOLUTION INTRAVENOUS PRN
Status: DISCONTINUED | OUTPATIENT
Start: 2021-12-03 | End: 2021-12-03 | Stop reason: SDUPTHER

## 2021-12-03 RX ORDER — FENTANYL CITRATE 50 UG/ML
INJECTION, SOLUTION INTRAMUSCULAR; INTRAVENOUS PRN
Status: DISCONTINUED | OUTPATIENT
Start: 2021-12-03 | End: 2021-12-03 | Stop reason: SDUPTHER

## 2021-12-03 RX ORDER — FENTANYL CITRATE 50 UG/ML
50 INJECTION, SOLUTION INTRAMUSCULAR; INTRAVENOUS EVERY 5 MIN PRN
Status: DISCONTINUED | OUTPATIENT
Start: 2021-12-03 | End: 2021-12-03 | Stop reason: HOSPADM

## 2021-12-03 RX ORDER — DORZOLAMIDE HCL 20 MG/ML
1 SOLUTION/ DROPS OPHTHALMIC 2 TIMES DAILY
Status: DISCONTINUED | OUTPATIENT
Start: 2021-12-03 | End: 2021-12-04 | Stop reason: HOSPADM

## 2021-12-03 RX ORDER — ONDANSETRON 2 MG/ML
4 INJECTION INTRAMUSCULAR; INTRAVENOUS EVERY 6 HOURS PRN
Status: DISCONTINUED | OUTPATIENT
Start: 2021-12-03 | End: 2021-12-04 | Stop reason: HOSPADM

## 2021-12-03 RX ORDER — SODIUM CHLORIDE 9 MG/ML
INJECTION, SOLUTION INTRAVENOUS CONTINUOUS
Status: DISCONTINUED | OUTPATIENT
Start: 2021-12-03 | End: 2021-12-04 | Stop reason: HOSPADM

## 2021-12-03 RX ORDER — PROMETHAZINE HYDROCHLORIDE 25 MG/ML
12.5 INJECTION, SOLUTION INTRAMUSCULAR; INTRAVENOUS
Status: DISCONTINUED | OUTPATIENT
Start: 2021-12-03 | End: 2021-12-03 | Stop reason: HOSPADM

## 2021-12-03 RX ORDER — TIMOLOL MALEATE 5 MG/ML
1 SOLUTION/ DROPS OPHTHALMIC 2 TIMES DAILY
Status: DISCONTINUED | OUTPATIENT
Start: 2021-12-03 | End: 2021-12-04 | Stop reason: HOSPADM

## 2021-12-03 RX ORDER — DORZOLAMIDE HYDROCHLORIDE AND TIMOLOL MALEATE 20; 5 MG/ML; MG/ML
1 SOLUTION/ DROPS OPHTHALMIC 2 TIMES DAILY
Status: DISCONTINUED | OUTPATIENT
Start: 2021-12-03 | End: 2021-12-03 | Stop reason: CLARIF

## 2021-12-03 RX ORDER — FENTANYL CITRATE 50 UG/ML
25 INJECTION, SOLUTION INTRAMUSCULAR; INTRAVENOUS EVERY 5 MIN PRN
Status: DISCONTINUED | OUTPATIENT
Start: 2021-12-03 | End: 2021-12-03 | Stop reason: HOSPADM

## 2021-12-03 RX ORDER — PROMETHAZINE HYDROCHLORIDE 25 MG/ML
INJECTION, SOLUTION INTRAMUSCULAR; INTRAVENOUS PRN
Status: DISCONTINUED | OUTPATIENT
Start: 2021-12-03 | End: 2021-12-03 | Stop reason: SDUPTHER

## 2021-12-03 RX ORDER — DIPHENHYDRAMINE HYDROCHLORIDE 50 MG/ML
12.5 INJECTION INTRAMUSCULAR; INTRAVENOUS
Status: DISCONTINUED | OUTPATIENT
Start: 2021-12-03 | End: 2021-12-03 | Stop reason: HOSPADM

## 2021-12-03 RX ORDER — DIFLUPREDNATE 0.5 MG/ML
1 EMULSION OPHTHALMIC 2 TIMES DAILY
Status: DISCONTINUED | OUTPATIENT
Start: 2021-12-03 | End: 2021-12-04 | Stop reason: HOSPADM

## 2021-12-03 RX ORDER — SUCCINYLCHOLINE/SOD CL,ISO/PF 200MG/10ML
SYRINGE (ML) INTRAVENOUS PRN
Status: DISCONTINUED | OUTPATIENT
Start: 2021-12-03 | End: 2021-12-03 | Stop reason: SDUPTHER

## 2021-12-03 RX ORDER — SODIUM CHLORIDE 0.9 % (FLUSH) 0.9 %
5-40 SYRINGE (ML) INJECTION EVERY 12 HOURS SCHEDULED
Status: DISCONTINUED | OUTPATIENT
Start: 2021-12-03 | End: 2021-12-04 | Stop reason: HOSPADM

## 2021-12-03 RX ORDER — METOCLOPRAMIDE HYDROCHLORIDE 5 MG/ML
10 INJECTION INTRAMUSCULAR; INTRAVENOUS
Status: DISCONTINUED | OUTPATIENT
Start: 2021-12-03 | End: 2021-12-03 | Stop reason: HOSPADM

## 2021-12-03 RX ORDER — PREDNISOLONE ACETATE 10 MG/ML
1 SUSPENSION/ DROPS OPHTHALMIC 2 TIMES DAILY
Status: DISCONTINUED | OUTPATIENT
Start: 2021-12-03 | End: 2021-12-04 | Stop reason: HOSPADM

## 2021-12-03 RX ADMIN — FENTANYL CITRATE 50 MCG: 50 INJECTION, SOLUTION INTRAMUSCULAR; INTRAVENOUS at 16:19

## 2021-12-03 RX ADMIN — SODIUM CHLORIDE: 9 INJECTION, SOLUTION INTRAVENOUS at 13:02

## 2021-12-03 RX ADMIN — MORPHINE SULFATE 4 MG: 4 INJECTION, SOLUTION INTRAMUSCULAR; INTRAVENOUS at 05:50

## 2021-12-03 RX ADMIN — PROPOFOL 200 MG: 10 INJECTION, EMULSION INTRAVENOUS at 15:17

## 2021-12-03 RX ADMIN — PROMETHAZINE HYDROCHLORIDE 12.5 MG: 25 INJECTION INTRAMUSCULAR; INTRAVENOUS at 15:36

## 2021-12-03 RX ADMIN — PREDNISOLONE ACETATE 1 DROP: 10 SUSPENSION/ DROPS OPHTHALMIC at 11:38

## 2021-12-03 RX ADMIN — HYDROMORPHONE HYDROCHLORIDE 0.5 MG: 1 INJECTION, SOLUTION INTRAMUSCULAR; INTRAVENOUS; SUBCUTANEOUS at 16:30

## 2021-12-03 RX ADMIN — ONDANSETRON 4 MG: 2 INJECTION INTRAMUSCULAR; INTRAVENOUS at 07:17

## 2021-12-03 RX ADMIN — Medication 120 MG: at 15:18

## 2021-12-03 RX ADMIN — FENTANYL CITRATE 50 MCG: 0.05 INJECTION, SOLUTION INTRAMUSCULAR; INTRAVENOUS at 09:43

## 2021-12-03 RX ADMIN — DIFLUPREDNATE 1 DROP: 0.5 EMULSION OPHTHALMIC at 20:16

## 2021-12-03 RX ADMIN — FENTANYL CITRATE 50 MCG: 50 INJECTION, SOLUTION INTRAMUSCULAR; INTRAVENOUS at 15:40

## 2021-12-03 RX ADMIN — HYDROMORPHONE HYDROCHLORIDE 0.5 MG: 1 INJECTION, SOLUTION INTRAMUSCULAR; INTRAVENOUS; SUBCUTANEOUS at 16:25

## 2021-12-03 RX ADMIN — MORPHINE SULFATE 2 MG: 2 INJECTION, SOLUTION INTRAMUSCULAR; INTRAVENOUS at 14:26

## 2021-12-03 RX ADMIN — SODIUM CHLORIDE 1000 ML: 9 INJECTION, SOLUTION INTRAVENOUS at 05:50

## 2021-12-03 RX ADMIN — IOPAMIDOL 80 ML: 755 INJECTION, SOLUTION INTRAVENOUS at 06:28

## 2021-12-03 RX ADMIN — PIPERACILLIN AND TAZOBACTAM 3375 MG: 3; .375 INJECTION, POWDER, LYOPHILIZED, FOR SOLUTION INTRAVENOUS at 15:26

## 2021-12-03 RX ADMIN — PREDNISOLONE ACETATE 1 DROP: 10 SUSPENSION/ DROPS OPHTHALMIC at 20:16

## 2021-12-03 RX ADMIN — FENTANYL CITRATE 50 MCG: 50 INJECTION, SOLUTION INTRAMUSCULAR; INTRAVENOUS at 15:28

## 2021-12-03 RX ADMIN — MORPHINE SULFATE 4 MG: 4 INJECTION, SOLUTION INTRAMUSCULAR; INTRAVENOUS at 07:18

## 2021-12-03 RX ADMIN — FENTANYL CITRATE 50 MCG: 50 INJECTION, SOLUTION INTRAMUSCULAR; INTRAVENOUS at 15:17

## 2021-12-03 RX ADMIN — ONDANSETRON 4 MG: 2 INJECTION INTRAMUSCULAR; INTRAVENOUS at 05:50

## 2021-12-03 RX ADMIN — ROCURONIUM BROMIDE 20 MG: 10 INJECTION INTRAVENOUS at 15:40

## 2021-12-03 RX ADMIN — PIPERACILLIN AND TAZOBACTAM 4500 MG: 4; .5 INJECTION, POWDER, FOR SOLUTION INTRAVENOUS at 11:36

## 2021-12-03 RX ADMIN — DORZOLAMIDE HYDROCHLORIDE AND TIMOLOL MALEATE 1 DROP: 20; 5 SOLUTION OPHTHALMIC at 11:37

## 2021-12-03 RX ADMIN — ONDANSETRON 4 MG: 2 INJECTION INTRAMUSCULAR; INTRAVENOUS at 14:26

## 2021-12-03 RX ADMIN — LIDOCAINE HYDROCHLORIDE 100 MG: 20 INJECTION, SOLUTION INTRAVENOUS at 15:17

## 2021-12-03 RX ADMIN — DIFLUPREDNATE 1 DROP: 0.5 EMULSION OPHTHALMIC at 11:36

## 2021-12-03 RX ADMIN — ROCURONIUM BROMIDE 30 MG: 10 INJECTION INTRAVENOUS at 15:26

## 2021-12-03 RX ADMIN — MORPHINE SULFATE 4 MG: 4 INJECTION, SOLUTION INTRAMUSCULAR; INTRAVENOUS at 20:14

## 2021-12-03 RX ADMIN — ONDANSETRON HYDROCHLORIDE 4 MG: 4 INJECTION, SOLUTION INTRAMUSCULAR; INTRAVENOUS at 15:36

## 2021-12-03 ASSESSMENT — PAIN DESCRIPTION - LOCATION
LOCATION: ABDOMEN

## 2021-12-03 ASSESSMENT — PAIN SCALES - GENERAL
PAINLEVEL_OUTOF10: 5
PAINLEVEL_OUTOF10: 0
PAINLEVEL_OUTOF10: 8
PAINLEVEL_OUTOF10: 4
PAINLEVEL_OUTOF10: 2
PAINLEVEL_OUTOF10: 4
PAINLEVEL_OUTOF10: 8
PAINLEVEL_OUTOF10: 7
PAINLEVEL_OUTOF10: 5

## 2021-12-03 ASSESSMENT — PAIN DESCRIPTION - PAIN TYPE
TYPE: ACUTE PAIN;SURGICAL PAIN
TYPE: ACUTE PAIN
TYPE: ACUTE PAIN
TYPE: ACUTE PAIN;SURGICAL PAIN
TYPE: ACUTE PAIN

## 2021-12-03 ASSESSMENT — ENCOUNTER SYMPTOMS
COUGH: 0
SORE THROAT: 0
RHINORRHEA: 0
SINUS PAIN: 0
DIARRHEA: 1
ABDOMINAL PAIN: 1
NAUSEA: 1
BACK PAIN: 0
EYE REDNESS: 0
SHORTNESS OF BREATH: 0
VOMITING: 1

## 2021-12-03 ASSESSMENT — PAIN DESCRIPTION - FREQUENCY
FREQUENCY: CONTINUOUS
FREQUENCY: CONTINUOUS

## 2021-12-03 ASSESSMENT — PAIN DESCRIPTION - ONSET: ONSET: ON-GOING

## 2021-12-03 ASSESSMENT — PAIN DESCRIPTION - PROGRESSION: CLINICAL_PROGRESSION: NOT CHANGED

## 2021-12-03 ASSESSMENT — PAIN DESCRIPTION - DESCRIPTORS
DESCRIPTORS: ACHING
DESCRIPTORS: ACHING;NAGGING
DESCRIPTORS: ACHING

## 2021-12-03 ASSESSMENT — PAIN DESCRIPTION - ORIENTATION
ORIENTATION: LOWER
ORIENTATION: LOWER;MID
ORIENTATION: RIGHT;LOWER

## 2021-12-03 ASSESSMENT — PAIN - FUNCTIONAL ASSESSMENT: PAIN_FUNCTIONAL_ASSESSMENT: ACTIVITIES ARE NOT PREVENTED

## 2021-12-03 NOTE — ED NOTES
Denies needing pain or nausea medication at this time.   Updated plan of care regarding sx approximately 1450 & voices understanding      Ena Allison RN  12/03/21 1205

## 2021-12-03 NOTE — ED PROVIDER NOTES
[facility] ENCOUNTER          Pt Name: Naomi Rader  MRN: [id number]  [place] 1970  Date of evaluation: 12/3/2021  Treating Resident Physician: Jermain Arevalo MD  Supervising Physician: Dr Amauri Wang       Chief Complaint   Patient presents with    Abdominal Pain     History obtained from the patient. HISTORY OF PRESENT ILLNESS    HPI  Naomi Rader is a 53 y.o. male with past medical history of anxiety, hypertension, hyperlipidemia, type I juvenile type diabetes mellitus who presents to the emergency department for evaluation of right lower quadrant abdominal pain. Patient began to have abdominal pain periumbilical 2 days ago that progressively worsened and now more localized in his right lower quadrant. He also complains of one episode of nonbilious nonbloody emesis last night decreased appetite. Mentions some loose stools over the last day nonbloody. Denies any fever chills. Denies any testicular pain or swelling. Denies any dysuria or hematuria. The patient has no other acute complaints at this time. REVIEW OF SYSTEMS   Review of Systems   Constitutional: Positive for chills. Negative for fever. HENT: Negative for rhinorrhea, sinus pain and sore throat. Eyes: Negative for redness. Respiratory: Negative for cough and shortness of breath. Cardiovascular: Negative for chest pain. Gastrointestinal: Positive for abdominal pain, diarrhea, nausea and vomiting. Genitourinary: Negative for dysuria, scrotal swelling and testicular pain. Musculoskeletal: Negative for back pain. Skin: Negative for rash. Neurological: Negative for light-headedness and headaches. Psychiatric/Behavioral: Negative for agitation.          PAST MEDICAL AND SURGICAL HISTORY     Past Medical History:   Diagnosis Date    Anxiety     Detached retina 08/2018    right    Diabetic retinopathy ([facility] [address]     bilat, has had laser and injections    Ganglion cyst     Heart burn     Hyperlipidemia     Hypertension     Insulin pump in place     Neuropathy     Pneumonia     at age 26   Jeaneen [facility] Retinal detachment     RIGHT    Sleep apnea     has Cpap at home but does not wear like suppose to    Type I (juvenile type) diabetes mellitus without mention of complication, not stated as uncontrolled     12/1982    Wears glasses     usually uses contact lenses     Past Surgical History:   Procedure Laterality Date    CYST REMOVAL  1992    rt foot, ganglion    EYE SURGERY      has had laser to both eyes in office    MI OFFICE/OUTPT VISIT,PROCEDURE ONLY Right 8/15/2018    VITRECTOMY 23 GAUGE, MEMBRANE PEELING, AIR FLUID EXCHANGE, AIR GAS EXCHANGE WITH 16% C3F8, ENDOLASER, 200 MSECONDS, 200 MWATTS, 809 PULSES performed by Trisha Wiseman MD at [address] [place] OFFICE/OUTPT VISIT,PROCEDURE ONLY Right 9/19/2018    VITRECTOMY 23 GAUGE WITH SCLERAL BUCKLE, SILICONE OIL INSERTION, ENDOLASER, 200 MWATTS, 200 MSECONDS, 312 PULSES.  performed by Trisha Wiseman MD at [facility]  2016    has never had lasik eye surgery    VITRECTOMY Right 08/15/2018    membrane peeling, air gas exchange    VITRECTOMY Right 08/15/2018    Dr Marina Gillespie    VITRECTOMY Right 69/48/0123    laser silicone oil injection    WISDOM TOOTH EXTRACTION  2005         MEDICATIONS     Current Facility-Administered Medications:     fentaNYL (SUBLIMAZE) injection 50 mcg, 50 mcg, IntraVENous, Once, Farzana Fitzpatrick MD    piperacillin-tazobactam (ZOSYN) 4,500 mg in dextrose 5 % 100 mL IVPB (mini-bag), 4,500 mg, IntraVENous, Once, Farzana Fitzpatrick MD    morphine injection 4 mg, 4 mg, IntraVENous, Once, Farzana Fitzpatrick MD    ondansetron [facility]) injection 4 mg, 4 mg, IntraVENous, Once, Farzana Fitzpatrick MD    Current Outpatient Medications:     insulin aspart (NOVOLOG) 100 UNIT/ML injection vial, INJECT AS DIRECTED PER PHYSICIAN MAX 80 UNITS PER DAY, Disp: 80 mL, Rfl: 1    escitalopram (LEXAPRO) 20 MG tablet, Take 1 tablet by mouth every day, Disp: 90 tablet, Rfl: 2    prednisoLONE acetate (PRED FORTE) 1 % ophthalmic suspension, Place 1 drop into both eyes 2 times daily, Disp: , Rfl:     Continuous Blood Gluc Sensor (FREESTYLE TIBURCIO 14 DAY SENSOR) MISC, 1 sensor every 14 days (2 per month), Disp: 2 each, Rfl: 11    Continuous Blood Gluc Sensor (FREESTYLE TIBURCIO 14 DAY SENSOR) MISC, Change every 14 days, Disp: 1 each, Rfl: 3    atorvastatin (LIPITOR) 10 MG tablet, TAKE 1 TABLET BY MOUTH EVERY DAY, Disp: 90 tablet, Rfl: 3    quinapril (ACCUPRIL) 10 MG tablet, [id number], Disp: 90 tablet, Rfl: 3    pantoprazole (PROTONIX) 40 MG tablet, TAKE 1 TABLET BY MOUTH EVERY DAY, Disp: 90 tablet, Rfl: 2    latanoprost (XALATAN) 0.005 % ophthalmic solution, Apply 1 drop to eye nightly, Disp: , Rfl:     dorzolamide-timolol (COSOPT) 22.3-6.8 MG/ML ophthalmic solution, Place 1 drop into the right eye 2 times daily, Disp: , Rfl:     difluprednate (DUREZOL) 0.05 % EMUL, Place 1 drop into the left eye 2 times daily, Disp: , Rfl:     Sodium Chloride, Hypertonic, (ADÁN 128 OP), Apply 1 drop to eye 2 times daily Right eye, Disp: , Rfl:     blood glucose test strips (ONE TOUCH ULTRA TEST) strip, Use to test blood glucose levels up to 6 times per day.  Diagnosis: E10.9, Disp: 550 strip, Rfl: 1    glucagon 1 MG injection, Inject 1 mg into the skin See Admin Instructions Follow package directions for low blood sugar., Disp: 1 kit, Rfl: 3    aspirin 81 MG tablet, Take 81 mg by mouth nightly , Disp: , Rfl:     Insulin Syringe-Needle U-100 31G X 5/16\" 0.5 ML MISC, 1 each by Does not apply route 4 times daily, Disp: 100 each, Rfl: 2      SOCIAL HISTORY     Social History     Social History Narrative    Not on file     Social History     Tobacco Use    Smoking status: Former Smoker     Packs/day: 0.25     Years: 15.00     Pack years: 3.75     Quit date: 1/26/2002     Years since quittin.8    Smokeless tobacco: Current User     Types: Chew   Vaping Use    Vaping Use: Never used   Substance Use Topics    Alcohol use: Yes     Alcohol/week: 3.0 standard drinks     Types: 3 Cans of beer per week     Comment: daily    Drug use: No         ALLERGIES     Allergies   Allergen Reactions    Seasonal          FAMILY HISTORY     Family History   Problem Relation Age of Onset    Heart Disease Father     Asthma Father     High Blood Pressure Father          PREVIOUS RECORDS   Previous records reviewed: Patient was seen on 2021 at [facility] urgent care for infective otitis externa of the right ear. PHYSICAL EXAM     ED Triage Vitals   BP Temp Temp src Pulse Resp SpO2 Height Weight   -- -- -- -- -- -- -- --     Initial vital signs and nursing assessment reviewed and abnormal from Tachycardic, hypertensive. Pulsoximetry is normal per my interpretation. Additional Vital Signs:  Vitals:    21 0638   BP: (!) 162/84   Pulse: 97   Resp: 17   Temp:    SpO2: 97%       Physical Exam  Vitals and nursing note reviewed. Constitutional:       General: He is in acute distress. Appearance: Normal appearance. He is ill-appearing. He is not toxic-appearing. HENT:      Head: Normocephalic and atraumatic. Right Ear: External ear normal.      Left Ear: External ear normal.      Nose: Nose normal.      Mouth/Throat:      Mouth: Mucous membranes are moist.      Pharynx: Oropharynx is clear. Eyes:      General: No scleral icterus. Conjunctiva/sclera: Conjunctivae normal.   Cardiovascular:      Rate and Rhythm: Normal rate and regular rhythm. Pulses: Normal pulses. Heart sounds: Normal heart sounds. Pulmonary:      Effort: Pulmonary effort is normal. No respiratory distress. Breath sounds: Normal breath sounds. Abdominal:      General: Abdomen is flat. There is no distension. Palpations: Abdomen is soft. Tenderness:  There is abdominal tenderness in the right lower quadrant. There is guarding and rebound. Comments: Right lower quadrant tenderness palpation with voluntary guarding or rebound tenderness. Musculoskeletal:         General: Normal range of motion. Cervical back: Normal range of motion and neck supple. No rigidity. No muscular tenderness. Lymphadenopathy:      Cervical: No cervical adenopathy. Skin:     General: Skin is warm and dry. Capillary Refill: Capillary refill takes less than 2 seconds. Coloration: Skin is not jaundiced. Neurological:      General: No focal deficit present. Mental Status: He is alert and oriented to person, place, and time. Psychiatric:         Mood and Affect: Mood normal.         Behavior: Behavior normal.           MEDICAL DECISION MAKING   Initial Assessment: This is a 20-year-old male with type 1 diabetes mellitus who presents with periumbilical abdominal pain that is been present past 2 days and has worsened over the past day with the localizing the right lower quadrant. He is tender on palpation. Mentions one episode of nonbilious nonbloody emesis and two episodes of loose nonbloody stools. Differential Diagnosis Included but not limited to: Diverticulitis appendicitis mesenteric adenitis, gastritis, gastroenteritis    MDM:   Patient's findings are concerning for appendicitis versus diverticulitis at this time is. He has a mild leukocytosis upon laboratory studies. He is still pending a CT abdomen pelvis with IV contrast.  Patient's case was signed out to [facility] for further evaluation.         ED RESULTS   Laboratory results:  Labs Reviewed   CBC WITH AUTO DIFFERENTIAL - Abnormal; Notable for the following components:       Result Value    WBC 15.9 (*)     Segs Absolute 12.4 (*)     Monocytes Absolute 1.4 (*)     Immature Grans (Abs) 0.09 (*)     All other components within normal limits   BASIC METABOLIC PANEL - Abnormal; Notable for the following components:    CO2 19 (*)

## 2021-12-03 NOTE — ED NOTES
Patient states he doesn't want insulin due to being NPO at this time     Lady Velásquez, RN  12/03/21 9235

## 2021-12-03 NOTE — PROGRESS NOTES
1618 Awake and oriented on arrival to PACU , pt c/o # 7 - 8 ABD pain ,   1625 medicated with Dilaudid 0.5 mg IV  1630 no change in pain , medicated with Dilaudid 0.5 mg IV  1642 pt states his Insulin pump reading 204 , adjusted pump   1650 eyes closed resp easy   1656 pt states pain tolerable , denies any needs, meets criteria for discharge , transported to  K wife to meet pt at elevator to go to room

## 2021-12-03 NOTE — ANESTHESIA POSTPROCEDURE EVALUATION
Department of Anesthesiology  Postprocedure Note    Patient: Renee Cortez  MRN: [id number]  YOB: 1970  Date of evaluation: 12/3/2021  Time:  6:39 PM     Procedure Summary     Date: 12/03/21 Room / Location: [facility]    Anesthesia Start: 0009 Anesthesia Stop: 3882    Procedure: LAP APPENDECTOMY (N/A Abdomen) Diagnosis: (ABDOMINAL PAIN)    Surgeons: Karen Avila MD Responsible Provider: Marshall Gomes DO    Anesthesia Type: general ASA Status: 2          Anesthesia Type: general    Tony Phase I: Tony Score: 10    Tony Phase II:      Last vitals: Reviewed and per EMR flowsheets. Anesthesia Post Evaluation    Patient location during evaluation: PACU  Patient participation: complete - patient participated  Level of consciousness: awake and alert  Airway patency: patent  Nausea & Vomiting: no nausea and no vomiting  Complications: no  Cardiovascular status: hemodynamically stable  Respiratory status: acceptable  Hydration status: euvolemic      [facility]  POST-ANESTHESIA NOTE       Name:  Renee Cortez                                         Age:  53 y.o.   MRN:  [id number]      Last Vitals:  /72   Pulse 90   Temp 98.2 °F (36.8 °C) (Oral)   Resp 16   Ht 6' (1.829 m)   Wt 195 lb (88.5 kg)   SpO2 96%   BMI 26.45 kg/m²   Patient Vitals for the past 4 hrs:   BP Temp Temp src Pulse Resp SpO2   12/03/21 1707 133/72 98.2 °F (36.8 °C) Oral 90 16 96 %   12/03/21 1640 (!) 141/73   88 17 93 %   12/03/21 1635 (!) 148/72   88 21 92 %   12/03/21 1630 (!) 155/78   90 20 97 %   12/03/21 1625 (!) 168/76   92 22 97 %   12/03/21 1620 (!) 164/80   103 20 95 %   12/03/21 1618 (!) 183/96 97 °F (36.1 °C) Temporal 92 22 97 %       Level of Consciousness:  Awake    Respiratory:  Stable    Oxygen Saturation:  Stable    Cardiovascular:  Stable    Hydration:  Adequate    PONV:  Stable    Post-op Pain:  Adequate analgesia    Post-op Assessment:  No apparent anesthetic complications    Additional Follow-Up / Treatment / Comment:  Ananya Moreno MD  December 3, 2021   6:39 PM

## 2021-12-03 NOTE — ED TRIAGE NOTES
Presents to ED with c/o RLQ abdominal pain that started 2 days ago. Reports nausea. Denies constipation/ diarrhea. Denies urinary problems.

## 2021-12-03 NOTE — BRIEF OP NOTE
Brief Postoperative Note      Patient: Fuentes Del Rio  YOB: 1970  MRN: [id number]    Date of Procedure: 12/3/2021    Pre-Op Diagnosis: ABDOMINAL PAIN acute appendicitis    Post-Op Diagnosis: Same       Procedure(s):  LAP APPENDECTOMY    Surgeon(s):  Radha Perez MD    Assistant:  First Assistant: Delmis Dickson RN    Anesthesia: General    Estimated Blood Loss (mL): less than 50     Complications: None    Specimens:   ID Type Source Tests Collected by Time Destination   A : appendix Tissue Appendix 1 Solis Zamora MD 12/3/2021 1541        Implants:  * No implants in log *      Drains: * No LDAs found *    Findings:  Acute fibrinopurulent appendicitis    Electronically signed by Radha Perez MD on 12/3/2021 at 4:10 PM

## 2021-12-03 NOTE — H&P
History   has a past surgical history that includes Rogers tooth extraction (2005); cyst removal (1992); Refractive surgery (2016); eye surgery; vitrectomy (Right, 08/15/2018); pr office/outpt visit,procedure only (Right, 8/15/2018); vitrectomy (Right, 08/15/2018); vitrectomy (Right, 09/19/2018); and pr office/outpt visit,procedure only (Right, 9/19/2018). Medications  Prior to Admission medications    Medication Sig Start Date End Date Taking?  Authorizing Provider   insulin aspart (NOVOLOG) 100 UNIT/ML injection vial INJECT AS DIRECTED PER PHYSICIAN MAX 80 UNITS PER DAY 11/4/21  Yes Christo Fernandes MD   escitalopram (LEXAPRO) 20 MG tablet Take 1 tablet by mouth every day 8/16/21  Yes Turner Lakhani MD   prednisoLONE acetate (PRED FORTE) 1 % ophthalmic suspension Place 1 drop into both eyes 2 times daily 6/26/21  Yes Historical Provider, MD   atorvastatin (LIPITOR) 10 MG tablet TAKE 1 TABLET BY MOUTH EVERY DAY 6/30/21  Yes Christo Fernandes MD   quinapril (ACCUPRIL) 10 MG tablet [id number] 6/30/21  Yes Christo Fernandes MD   pantoprazole (PROTONIX) 40 MG tablet TAKE 1 TABLET BY MOUTH EVERY DAY 6/17/21  Yes Turner Lakhani MD   latanoprost (XALATAN) 0.005 % ophthalmic solution Apply 1 drop to eye nightly 5/20/21  Yes Historical Provider, MD   dorzolamide-timolol (COSOPT) 22.3-6.8 MG/ML ophthalmic solution Place 1 drop into the right eye 2 times daily 5/20/21  Yes Historical Provider, MD   Sodium Chloride, Hypertonic, (ADÁN 128 OP) Apply 1 drop to eye 2 times daily Right eye   Yes Historical Provider, MD   aspirin 81 MG tablet Take 81 mg by mouth nightly    Yes Historical Provider, MD   Continuous Blood Gluc Sensor (FREESTYLE TIBURCIO 14 DAY SENSOR) MISC 1 sensor every 14 days (2 per month) 7/12/21   Christo Fernandes MD   Continuous Blood Gluc Sensor (FREESTYLE TIBURCIO 14 DAY SENSOR) MISC Change every 14 days 7/12/21   Christo Fernandes MD   blood glucose test strips (ONE TOUCH ULTRA TEST) strip Use to test blood glucose levels up to 6 times per day. Diagnosis: E10.9 7/16/18   Sadaf Tripp Luan, APRN - CNP   glucagon 1 MG injection Inject 1 mg into the skin See Admin Instructions Follow package directions for low blood sugar. 3/8/18   Barbra Gillette MD   Insulin Syringe-Needle U-100 31G X 5/16\" 0.5 ML MISC 1 each by Does not apply route 4 times daily 12/21/15   Bhupinder Motta MD    Scheduled Meds:   difluprednate  1 drop Left Eye BID    [START ON 12/4/2021] escitalopram  20 mg Oral Daily    latanoprost  1 drop Ophthalmic Nightly    [START ON 12/4/2021] pantoprazole  40 mg Oral Daily    prednisoLONE acetate  1 drop Both Eyes BID    [START ON 12/4/2021] lisinopril  10 mg Oral Daily    sodium chloride flush  5-40 mL IntraVENous 2 times per day    piperacillin-tazobactam (ZOSYN) 3375 mg in dextrose 5% IVPB extended infusion (mini-bag)  3,375 mg IntraVENous Q8H    insulin lispro  0-12 Units SubCUTAneous TID WC    insulin lispro  0-6 Units SubCUTAneous Nightly    dorzolamide  1 drop Right Eye BID    And    timolol  1 drop Right Eye BID     Continuous Infusions:   sodium chloride      sodium chloride       PRN Meds:.sodium chloride, sodium chloride flush, sodium chloride, ondansetron **OR** ondansetron, morphine **OR** morphine  Allergies  is allergic to seasonal.  Family History  family history includes Asthma in his father; Heart Disease in his father; High Blood Pressure in his father. Social History   reports that he quit smoking about 19 years ago. He has a 3.75 pack-year smoking history. His smokeless tobacco use includes chew. He reports current alcohol use of about 3.0 standard drinks of alcohol per week. He reports that he does not use drugs.   Review of Systems  General Denies any fever or chills  HEENT Denies any diplopia, tinnitus or vertigo  Resp Denies any shortness of breath, cough or wheezing  Cardiac Denies any chest pain, palpitations, claudication or edema  GI Abdominal pain, nausea and vomiting. Denies any melena, hematochezia, hematemesis or pyrosis   Denies any frequency, urgency, hesitancy or incontinence  Heme Denies bruising or bleeding easily  Endocrine Juvnile DM T1, denies history of thyroid problems  Neuro Denies any focal motor or sensory deficits  SUBJECTIVE:   CURRENT VITALS:  height is 6' (1.829 m) and weight is 195 lb (88.5 kg). His oral temperature is 98.2 °F (36.8 °C). His blood pressure is 146/81 (abnormal) and his pulse is 97. His respiration is 16 and oxygen saturation is 97%. Body mass index is 26.45 kg/m². Temperature Range (24h):Temp: 98.2 °F (36.8 °C) Temp  Av °F (36.7 °C)  Min: 97.8 °F (36.6 °C)  Max: 98.2 °F (36.8 °C)  BP Range (59V): Systolic (95SFY), BBB:768 , Min:146 , PYC:335     Diastolic (69PWV), YAD:98, Min:77, Max:105    Pulse Range (24h): Pulse  Av.5  Min: 96  Max: 120  Respiration Range (24h): Resp  Av.5  Min: 16  Max: 17  Current Pulse Ox (24h):  SpO2: 97 %  Pulse Ox Range (24h):  SpO2  Av.5 %  Min: 96 %  Max: 97 %  Oxygen Amount and Delivery:    CONSTITUTIONAL: Alert and oriented times 3, no acute distress and cooperative to examination with proper mood and affect. SKIN: Skin color, texture, turgor normal. No rashes or lesions. LYMPH: no cervical nodes, no inguinal nodes  HEENT: Head is normocephalic, atraumatic. EOMI, PERRLA. NECK: Supple, symmetrical, trachea midline, no adenopathy, thyroid symmetric, not enlarged and no tenderness, skin normal.  CHEST/LUNGS: chest symmetric with normal A/P diameter, normal respiratory rate and rhythm, lungs clear to auscultation without wheezes, rales or rhonchi. No accessory muscle use. Scars None   CARDIOVASCULAR: Heart sounds are normal.  Regular rate and rhythm without murmur, gallop or rub. Normal S1 and S2. Carotid and femoral pulses 2+/4 and equal bilaterally.   ABDOMEN: Tender to palpation in RLQ with positive guarding and rebound tenderness consistent with peritonitis, softly distended  NEUROLOGIC: There are no focalizing motor or sensory deficits. CN II-XII are grossly intact. Gladys Duckworth EXTREMITIES: no cyanosis, no clubbing and no edema. LABS:     Recent Labs     12/03/21  0510   WBC 15.9*   HGB 16.6   HCT 48.8         K 4.0   CL 99   CO2 19*   BUN 14   CREATININE 0.9   CALCIUM 9.7     RADIOLOGY:     CT ABDOMEN PELVIS W IV CONTRAST Additional Contrast? None   Final Result   Acute appendicitis without evidence of perforation or abscess. This document has been electronically signed by: Mina Peraza MD    on 12/03/2021 06:56 AM      All CTs at this facility use dose modulation techniques and iterative    reconstructions, and/or weight-based dosing   when appropriate to reduce radiation to a low as reasonably achievable. Electronically signed by Bev Porter PA-C on 12/3/2021 at 12:40 PM    Patient seen and evaluated in the emergency department. Pain onset Wednesday evening progress. Localized to the right lower quadrant. Care coordinated with Jacqueline Carrera. Nonoperative and operative management of acute appendicitis discussed. Patient wishes to proceed with surgical intervention. Discussed techniques and risks of appendectomy. All patient's questions answered. We'll continue IV antibiotics until such time as operating suite is available to complete procedure. Agree as documented.  Patient instructed to keep insulin pump off for now it is going to run out we will cover with sliding scale in the interval. Patient reports last glucose 1

## 2021-12-03 NOTE — ED NOTES
Patient transferred back to room 39 in stable condition. Patient rates abd pain 5/10. Patient reports from his insulin pump blood glucose 174. Patient is NPO with sips of water with meds.      Jose Raul Cesar RN  12/03/21 9487

## 2021-12-03 NOTE — ED PROVIDER NOTES
[address]    Pt Name: Erendira Jones  MRN: [id number]  [place] 1970  Date of evaluation: 9/12/20      I personally saw and examined the patient. I have reviewed and agree with the Resident findings, including all diagnostic interpretations and treatment plans as written. I was present for the key portion of any procedures performed and the inclusive time noted in any critical care statement. History: This patient was seen with Pratik Felipe, resident physician. This is a 23-year-old male who presents with abdominal pain that actually now does seem to be localized to the right lower quadrant. He feels better when he holds still. Now his CT scan has come back positive as suggestive of acute appendicitis. White count is just under 16,000. He does not have peritoneal signs.   We are contacting general surgery to arrange for admission    Diagnosis:  acute appendicitis  Admitted                Ace Elise DO  12/03/21 6873

## 2021-12-04 VITALS
TEMPERATURE: 97.8 F | WEIGHT: 195 LBS | HEIGHT: 72 IN | OXYGEN SATURATION: 97 % | SYSTOLIC BLOOD PRESSURE: 161 MMHG | DIASTOLIC BLOOD PRESSURE: 76 MMHG | RESPIRATION RATE: 18 BRPM | HEART RATE: 99 BPM | BODY MASS INDEX: 26.41 KG/M2

## 2021-12-04 LAB
BASOPHILS # BLD: 0.5 %
BASOPHILS ABSOLUTE: 0.1 THOU/MM3 (ref 0–0.1)
EOSINOPHIL # BLD: 0.5 %
EOSINOPHILS ABSOLUTE: 0.1 THOU/MM3 (ref 0–0.4)
ERYTHROCYTE [DISTWIDTH] IN BLOOD BY AUTOMATED COUNT: 12.9 % (ref 11.5–14.5)
ERYTHROCYTE [DISTWIDTH] IN BLOOD BY AUTOMATED COUNT: 42.6 FL (ref 35–45)
HCT VFR BLD CALC: 40.2 % (ref 42–52)
HEMOGLOBIN: 13.2 GM/DL (ref 14–18)
IMMATURE GRANS (ABS): 0.04 THOU/MM3 (ref 0–0.07)
IMMATURE GRANULOCYTES: 0.4 %
LYMPHOCYTES # BLD: 18.7 %
LYMPHOCYTES ABSOLUTE: 2 THOU/MM3 (ref 1–4.8)
MCH RBC QN AUTO: 29.4 PG (ref 26–33)
MCHC RBC AUTO-ENTMCNC: 32.8 GM/DL (ref 32.2–35.5)
MCV RBC AUTO: 89.5 FL (ref 80–94)
MONOCYTES # BLD: 10.2 %
MONOCYTES ABSOLUTE: 1.1 THOU/MM3 (ref 0.4–1.3)
NUCLEATED RED BLOOD CELLS: 0 /100 WBC
PLATELET # BLD: 222 THOU/MM3 (ref 130–400)
PMV BLD AUTO: 9.9 FL (ref 9.4–12.4)
RBC # BLD: 4.49 MILL/MM3 (ref 4.7–6.1)
SEG NEUTROPHILS: 69.7 %
SEGMENTED NEUTROPHILS ABSOLUTE COUNT: 7.6 THOU/MM3 (ref 1.8–7.7)
WBC # BLD: 10.9 THOU/MM3 (ref 4.8–10.8)

## 2021-12-04 PROCEDURE — 36415 COLL VENOUS BLD VENIPUNCTURE: CPT

## 2021-12-04 PROCEDURE — 85025 COMPLETE CBC W/AUTO DIFF WBC: CPT

## 2021-12-04 PROCEDURE — 96375 TX/PRO/DX INJ NEW DRUG ADDON: CPT

## 2021-12-04 PROCEDURE — 6370000000 HC RX 637 (ALT 250 FOR IP): Performed by: SURGERY

## 2021-12-04 PROCEDURE — 2580000003 HC RX 258: Performed by: SURGERY

## 2021-12-04 PROCEDURE — 6360000002 HC RX W HCPCS: Performed by: SURGERY

## 2021-12-04 PROCEDURE — 96376 TX/PRO/DX INJ SAME DRUG ADON: CPT

## 2021-12-04 PROCEDURE — 99024 POSTOP FOLLOW-UP VISIT: CPT | Performed by: SURGERY

## 2021-12-04 PROCEDURE — G0378 HOSPITAL OBSERVATION PER HR: HCPCS

## 2021-12-04 RX ORDER — HYDROCODONE BITARTRATE AND ACETAMINOPHEN 5; 325 MG/1; MG/1
2 TABLET ORAL EVERY 4 HOURS PRN
Status: DISCONTINUED | OUTPATIENT
Start: 2021-12-04 | End: 2021-12-04 | Stop reason: HOSPADM

## 2021-12-04 RX ORDER — HYDROCODONE BITARTRATE AND ACETAMINOPHEN 5; 325 MG/1; MG/1
1 TABLET ORAL EVERY 4 HOURS PRN
Qty: 32 TABLET | Refills: 0 | Status: SHIPPED | OUTPATIENT
Start: 2021-12-04 | End: 2021-12-11

## 2021-12-04 RX ORDER — HYDROCODONE BITARTRATE AND ACETAMINOPHEN 5; 325 MG/1; MG/1
1 TABLET ORAL EVERY 4 HOURS PRN
Status: DISCONTINUED | OUTPATIENT
Start: 2021-12-04 | End: 2021-12-04 | Stop reason: HOSPADM

## 2021-12-04 RX ORDER — KETOROLAC TROMETHAMINE 30 MG/ML
30 INJECTION, SOLUTION INTRAMUSCULAR; INTRAVENOUS ONCE
Status: COMPLETED | OUTPATIENT
Start: 2021-12-04 | End: 2021-12-04

## 2021-12-04 RX ORDER — AMOXICILLIN AND CLAVULANATE POTASSIUM 875; 125 MG/1; MG/1
1 TABLET, FILM COATED ORAL 2 TIMES DAILY
Qty: 10 TABLET | Refills: 0 | Status: SHIPPED | OUTPATIENT
Start: 2021-12-04 | End: 2021-12-09

## 2021-12-04 RX ADMIN — ESCITALOPRAM 20 MG: 20 TABLET, FILM COATED ORAL at 09:27

## 2021-12-04 RX ADMIN — PIPERACILLIN AND TAZOBACTAM 3375 MG: 3; .375 INJECTION, POWDER, LYOPHILIZED, FOR SOLUTION INTRAVENOUS at 03:13

## 2021-12-04 RX ADMIN — LISINOPRIL 10 MG: 10 TABLET ORAL at 09:27

## 2021-12-04 RX ADMIN — MORPHINE SULFATE 4 MG: 4 INJECTION, SOLUTION INTRAMUSCULAR; INTRAVENOUS at 02:04

## 2021-12-04 RX ADMIN — MORPHINE SULFATE 4 MG: 4 INJECTION, SOLUTION INTRAMUSCULAR; INTRAVENOUS at 00:02

## 2021-12-04 RX ADMIN — MORPHINE SULFATE 4 MG: 4 INJECTION, SOLUTION INTRAMUSCULAR; INTRAVENOUS at 07:04

## 2021-12-04 RX ADMIN — PANTOPRAZOLE SODIUM 40 MG: 40 TABLET, DELAYED RELEASE ORAL at 09:26

## 2021-12-04 RX ADMIN — HYDROCODONE BITARTRATE AND ACETAMINOPHEN 2 TABLET: 5; 325 TABLET ORAL at 09:26

## 2021-12-04 RX ADMIN — PIPERACILLIN AND TAZOBACTAM 3375 MG: 3; .375 INJECTION, POWDER, LYOPHILIZED, FOR SOLUTION INTRAVENOUS at 12:07

## 2021-12-04 RX ADMIN — MORPHINE SULFATE 4 MG: 4 INJECTION, SOLUTION INTRAMUSCULAR; INTRAVENOUS at 04:56

## 2021-12-04 RX ADMIN — KETOROLAC TROMETHAMINE 30 MG: 30 INJECTION, SOLUTION INTRAMUSCULAR; INTRAVENOUS at 09:26

## 2021-12-04 RX ADMIN — DIFLUPREDNATE 1 DROP: 0.5 EMULSION OPHTHALMIC at 09:30

## 2021-12-04 ASSESSMENT — PAIN SCALES - GENERAL
PAINLEVEL_OUTOF10: 5
PAINLEVEL_OUTOF10: 10
PAINLEVEL_OUTOF10: 8
PAINLEVEL_OUTOF10: 9
PAINLEVEL_OUTOF10: 8
PAINLEVEL_OUTOF10: 8

## 2021-12-04 ASSESSMENT — PAIN DESCRIPTION - FREQUENCY
FREQUENCY: CONTINUOUS
FREQUENCY: CONTINUOUS

## 2021-12-04 ASSESSMENT — PAIN DESCRIPTION - PROGRESSION
CLINICAL_PROGRESSION: NOT CHANGED
CLINICAL_PROGRESSION: GRADUALLY WORSENING
CLINICAL_PROGRESSION: NOT CHANGED
CLINICAL_PROGRESSION: NOT CHANGED

## 2021-12-04 ASSESSMENT — PAIN DESCRIPTION - PAIN TYPE
TYPE: ACUTE PAIN;SURGICAL PAIN
TYPE: ACUTE PAIN;SURGICAL PAIN

## 2021-12-04 ASSESSMENT — PAIN DESCRIPTION - ORIENTATION
ORIENTATION: LOWER;MID
ORIENTATION: MID

## 2021-12-04 ASSESSMENT — PAIN DESCRIPTION - DESCRIPTORS
DESCRIPTORS: ACHING
DESCRIPTORS: ACHING

## 2021-12-04 ASSESSMENT — PAIN DESCRIPTION - ONSET
ONSET: ON-GOING
ONSET: ON-GOING

## 2021-12-04 ASSESSMENT — PAIN DESCRIPTION - LOCATION
LOCATION: ABDOMEN
LOCATION: ABDOMEN

## 2021-12-04 ASSESSMENT — PAIN - FUNCTIONAL ASSESSMENT
PAIN_FUNCTIONAL_ASSESSMENT: ACTIVITIES ARE NOT PREVENTED
PAIN_FUNCTIONAL_ASSESSMENT: ACTIVITIES ARE NOT PREVENTED

## 2021-12-04 NOTE — OP NOTE
[address] [place] [address]                                OPERATIVE REPORT    PATIENT NAME: FIDEL AGARWAL                      :        1970  MED REC NO:   [id number]                           ROOM:       0028  ACCOUNT NO:   [de-identified]                           ADMIT DATE: 2021  PROVIDER:     JORGE Hassaner:  2021    PREOPERATIVE DIAGNOSIS:  Acute appendicitis    POSTOPERATIVE DIAGNOSIS:  Acute fibrinopurulent appendicitis. Procedure: Laparoscopic appendectomy    ANESTHESIA:  General.    ESTIMATED BLOOD LOSS:  Less than 50 mL. SPECIMEN:  Appendix to Pathology. ANESTHESIA:  General.    INDICATION:  See history and physical examination. DESCRIPTION OF PROCEDURE:  The patient brought to the operating suite,  placed supine on the operating table. He was re-dosed with Zosyn. After induction of general anesthesia, abdomen was clipped, prepped, and  draped. He had voided preoperatively. Incision was made just above the  umbilicus, dissection carried down the fascia. Fascia was elevated with  Kocher clamps. 0 Vicryl stay sutures were placed and the fascia was  incised. A 12-mm Fifi cannula was inserted and CO2 pneumoperitoneum  was introduced followed by the camera. A suprapubic 12-mm port was  placed as well. A 5-mm port was placed in the left lower quadrant. The  patient was placed in reverse Trendelenburg with the left side down. The appendix was visualized in the right lower quadrant. It was  distended, inflamed. There was some fibrinopurulent peel adherent to  the terminal ileum which was removed. There was no gross evidence of  perforation. The base of the appendix was divided with a GI load of the  endoscopic 45 mm stapling device. The mesoappendix was then divided  with a vascular load of the 45 mm endoscopic stapling device.   There was  an oozing vessel of the mid aspect of the staple line. This was  clipped. Hemostasis was good. The appendix was placed in EndoCatch type bag and removed through the  abdominal cavity. The left lower quadrant port was removed. There was  some bleeding from the site which was intra-abdominal.  Sutures were  placed, there was still bleeding, so that suture was enlarged at the  skin. Suture ligation of the bleeding vessel was performed with good  result. Laparoscopy was performed. There was no evidence of any  intra-abdominal bleeding. Remaining ports were removed. CO2  pneumoperitoneum suctioned from the abdominal cavity. Larger port site  fascia was closed with Ethibond sutures in an interrupted fashion. Skin  incisions were closed in a subcuticular fashion with absorbable suture. Skin glue was applied. The patient was spontaneously breathing and  transported to the recovery area in stable condition.         Max Jain M.D.    D: 12/03/2021 16:18:52       T: 12/03/2021 16:21:16     [email or web address]  Job#: [id number]     Doc#: [id number]    CC:

## 2021-12-04 NOTE — PROGRESS NOTES
Discharge instructions explained to patient and wife with verbalized understanding. Patient returning home with all personal belongings. Daily CHG bathing explained with verbalized understanding, with soap sent with patient.

## 2021-12-04 NOTE — PROGRESS NOTES
Mariam North MD  Postoperative Progress Note    Pt Name: Shelli Joshi  Medical Record Number: [id number]  Date of Birth 1970   Today's Date: 12/4/2021    JOANN Frederick is doing well. Pain is not well controlled. He has no nausea or vomiting. He complains of some back pain from laying in bed as well. OBJECTIVE  VITALS: VITALS:  BP (!) 144/84   Pulse 100   Temp 98.8 °F (37.1 °C) (Oral)   Resp 18   Ht 6' (1.829 m)   Wt 195 lb (88.5 kg)   SpO2 97%   BMI 26.45 kg/m²   GENERAL: alert, cooperative, no acute distress  LUNGS: Clear  HEART: Normal rate  ABDOMEN: Soft incisional tenderness no bruising   INCISION: clean, dry and intact  EXTERMITY: no cyanosis or edema  INTAKE/OUTPUT :   INTAKE/OUTPUT:    Intake/Output Summary (Last 24 hours) at 12/4/2021 0802  Last data filed at 12/4/2021 0544  Gross per 24 hour   Intake 1251.46 ml   Output 0 ml   Net 1251.46 ml        LABS  CBC with Differential:    Lab Results   Component Value Date    WBC 10.9 12/04/2021    RBC 4.49 12/04/2021    HGB 13.2 12/04/2021    HCT 40.2 12/04/2021     12/04/2021    MCV 89.5 12/04/2021    MCH 29.4 12/04/2021    MCHC 32.8 12/04/2021    RDW 14.0 06/26/2015    NRBC 0 12/04/2021    SEGSPCT 69.7 12/04/2021    MONOPCT 10.2 12/04/2021    MONOSABS 1.1 12/04/2021    LYMPHSABS 2.0 12/04/2021    EOSABS 0.1 12/04/2021    BASOSABS 0.1 12/04/2021         Hemoglobin stable    ASSESSMENT  1. POD # 1 laparoscopic appendectomy fibrinopurulent appendicitis  2. Poor pain control    PLAN  1. start oral pain medications. May have IV for breakthrough  2. VTE: SCDs hold Lovenox risk of bleeding had bleeding out of port site  3. Encourage activity  4. Possible discharge later today on 5 days of oral antibiotics and oral pain medications if pain is better controlled.   Demetria North MD,  Electronically signed 12/4/2021 at 8:01 AM No

## 2021-12-05 NOTE — DISCHARGE SUMMARY
Discharge Summary     Patient Identification:  Marleta Cushing  : 1970  MRN: [id number]   Account: [de-identified]     Admit date: 12/3/2021  Discharge date:2021  Attending provider: No att. providers found        Primary care provider: Lobo Ma MD     Discharge Diagnoses:   Principal Problem:    Acute appendicitis  Resolved Problems:    * No resolved hospital problems. *  Diabetes mellitus  Fibrinopurulent appendicitis without perforation. Hospital Course:   Marleta Cushing is a 53 y.o. male admitted to [facility] on 12/3/2021 for evaluation of abdominal pain. He was diagnosed with acute appendicitis and opted for surgical intervention. He underwent a laparoscopic appendectomy. He was admitted overnight. Had some issues with pain control which resolved with treatment and he was discharged home on 2021. Discharge Medications:     Medication List      START taking these medications    amoxicillin-clavulanate 875-125 MG per tablet  Commonly known as: AUGMENTIN  Take 1 tablet by mouth 2 times daily for 5 days     HYDROcodone-acetaminophen 5-325 MG per tablet  Commonly known as: NORCO  Take 1 tablet by mouth every 4 hours as needed for Pain for up to 7 days. CONTINUE taking these medications    aspirin 81 MG tablet     atorvastatin 10 MG tablet  Commonly known as: LIPITOR  TAKE 1 TABLET BY MOUTH EVERY DAY     blood glucose test strips strip  Commonly known as: ONE TOUCH ULTRA TEST  Use to test blood glucose levels up to 6 times per day.  Diagnosis: E10.9     dorzolamide-timolol 22.3-6.8 MG/ML ophthalmic solution  Commonly known as: COSOPT     escitalopram 20 MG tablet  Commonly known as: LEXAPRO  Take 1 tablet by mouth every day     * FreeStyle Marcos 14 Day Sensor Misc  1 sensor every 14 days (2 per month)     * FreeStyle Marcos 14 Day Sensor Misc  Change every 14 days     glucagon 1 MG injection  Inject 1 mg into the skin See Admin Instructions Follow package directions for low blood sugar. insulin aspart 100 UNIT/ML injection vial  Commonly known as: NovoLOG  INJECT AS DIRECTED PER PHYSICIAN MAX 80 UNITS PER DAY     Insulin Syringe-Needle U-100 31G X 5/16\" 0.5 ML Misc  1 each by Does not apply route 4 times daily     latanoprost 0.005 % ophthalmic solution  Commonly known as: XALATAN     ADÁN 128 OP     pantoprazole 40 MG tablet  Commonly known as: PROTONIX  TAKE 1 TABLET BY MOUTH EVERY DAY     prednisoLONE acetate 1 % ophthalmic suspension  Commonly known as: PRED FORTE     quinapril 10 MG tablet  Commonly known as: ACCUPRIL  [id number]         * This list has 2 medication(s) that are the same as other medications prescribed for you. Read the directions carefully, and ask your doctor or other care provider to review them with you. STOP taking these medications    Durezol 0.05 % Emul  Generic drug: difluprednate           Where to Get Your Medications      These medications were sent to [facility]/pharmacy #[id number]- [place], [place] - [address] [address] [facility] [phone] [facility] [phone]  [address] [place] [address]    Phone: [phone]   · amoxicillin-clavulanate 875-125 MG per tablet  · HYDROcodone-acetaminophen 5-325 MG per tablet       Follow-up visits:   Dr. Monty Kwok 2 weeks    Procedures: Laparoscopic appendectomy    Examination:  Vitals:  Vitals:    12/03/21 1707 12/03/21 2000 12/04/21 0311 12/04/21 0916   BP: 133/72 (!) 146/78 (!) 144/84 (!) 161/76   Pulse: 90 87 100 99   Resp: 16 18 18 18   Temp: 98.2 °F (36.8 °C) 98.9 °F (37.2 °C) 98.8 °F (37.1 °C) 97.8 °F (36.6 °C)   TempSrc: Oral Oral Oral Oral   SpO2: 96% 97% 97% 97%   Weight:       Height:         Weight: Weight: 195 lb (88.5 kg)     Significant Diagnostics:   Radiology: CT ABDOMEN PELVIS W IV CONTRAST Additional Contrast? None    Result Date: 12/3/2021  * ADDENDUM #1 * This report was discussed with Ephraim Reed RN on Dec 03, 2021 06:58:00 EST.  This document has been electronically signed by: An Saini [facility] on 12/03/2021 06:59 AM * ORIGINAL REPORT * EXAM: CT OF ABDOMEN/PELVIS WITH CONTRAST: COMPARISON: None TECHNIQUE: Enteric contrast was not administered. Contiguous axial images from lung bases through ischial tuberosities during uneventful intravenous administration of iodine contrast. Coronal and sagittal reformatted images were performed and reviewed. FINDINGS: The lung bases are clear. No pleural effusion identified. The retroperitoneal structures are unremarkable. No renal stone or urinary tract obstruction is identified. Gallbladder and bile ducts unremarkable. No abnormality of liver, spleen, or pancreas identified. Aorta and inferior vena cava unremarkable. No adenopathy or ascites is identified. Stomach and small bowel structures are unremarkable. Periappendiceal stranding, wall enhancement, and fluid distention of the appendix, with maximum diameter of 1.4 cm. No appendicolith, perforation or abscess is identified. No significant large bowel pathology is appreciated. Pelvic soft tissue structures are normal appearing. No significant bone lesion is identified. Acute appendicitis without evidence of perforation or abscess. This document has been electronically signed by: Ovi Alva MD on 12/03/2021 06:56 AM All CTs at this facility use dose modulation techniques and iterative reconstructions, and/or weight-based dosing when appropriate to reduce radiation to a low as reasonably achievable.       Labs:   Recent Results (from the past 72 hour(s))   CBC auto differential    Collection Time: 12/03/21  5:10 AM   Result Value Ref Range    WBC 15.9 (H) 4.8 - 10.8 thou/mm3    RBC 5.73 4.70 - 6.10 mill/mm3    Hemoglobin 16.6 14.0 - 18.0 gm/dl    Hematocrit 48.8 42.0 - 52.0 %    MCV 85.2 80.0 - 94.0 fL    MCH 29.0 26.0 - 33.0 pg    MCHC 34.0 32.2 - 35.5 gm/dl    RDW-CV 12.9 11.5 - 14.5 %    RDW-SD 39.4 35.0 - 45.0 fL    Platelets 096 054 - 117 thou/mm3    MPV 10.2 9.4 - 12.4 fL    Seg Neutrophils

## 2021-12-08 ENCOUNTER — TELEPHONE (OUTPATIENT)
Dept: FAMILY MEDICINE CLINIC | Age: 51
End: 2021-12-08

## 2021-12-08 NOTE — TELEPHONE ENCOUNTER
[facility] Trans[facility] Initial Follow Up Call    Call within 2 business days of discharge: Yes     Patient: Mohan Chen Patient : 1970   MRN: [id number]  Reason for Admission: acute appendicitis  Discharge Date: 21 RARS: No data recorded     Spoke with: LM for call back  Discharge department/facility: [facility]    Non-face-to-face services provided:       Follow Up  Future Appointments   Date Time Provider Shell Kenney   2021 10:45 AM MD Jm Livingston Surg [facility] [place]   2021 10:30 AM Jade Wooten RN [facility]   2021  8:30 AM Jake Concepcion  Roderick Baig  MED [facility] [place], [address] [facility]

## 2021-12-09 ENCOUNTER — OFFICE VISIT (OUTPATIENT)
Dept: FAMILY MEDICINE CLINIC | Age: 51
End: 2021-12-09
Payer: COMMERCIAL

## 2021-12-09 VITALS
BODY MASS INDEX: 27.3 KG/M2 | SYSTOLIC BLOOD PRESSURE: 136 MMHG | WEIGHT: 201.6 LBS | HEIGHT: 72 IN | HEART RATE: 86 BPM | DIASTOLIC BLOOD PRESSURE: 78 MMHG

## 2021-12-09 DIAGNOSIS — E10.8 CONTROLLED DIABETES MELLITUS TYPE 1 WITH COMPLICATIONS (HCC): ICD-10-CM

## 2021-12-09 DIAGNOSIS — Z90.49 S/P APPENDECTOMY: ICD-10-CM

## 2021-12-09 DIAGNOSIS — T81.41XA SUPERFICIAL INCISIONAL SURGICAL SITE INFECTION: ICD-10-CM

## 2021-12-09 DIAGNOSIS — Z09 HOSPITAL DISCHARGE FOLLOW-UP: Primary | ICD-10-CM

## 2021-12-09 DIAGNOSIS — K59.03 DRUG-INDUCED CONSTIPATION: ICD-10-CM

## 2021-12-09 PROCEDURE — 1111F DSCHRG MED/CURRENT MED MERGE: CPT | Performed by: NURSE PRACTITIONER

## 2021-12-09 PROCEDURE — 99495 TRANSJ CARE MGMT MOD F2F 14D: CPT | Performed by: NURSE PRACTITIONER

## 2021-12-09 RX ORDER — CEPHALEXIN 500 MG/1
500 CAPSULE ORAL 2 TIMES DAILY
Qty: 14 CAPSULE | Refills: 0 | Status: SHIPPED | OUTPATIENT
Start: 2021-12-09 | End: 2021-12-16

## 2021-12-09 ASSESSMENT — ENCOUNTER SYMPTOMS
EYE DISCHARGE: 0
COLOR CHANGE: 1
VOMITING: 0
COUGH: 0
WHEEZING: 0
EYE REDNESS: 0
SORE THROAT: 0
CONSTIPATION: 1
NAUSEA: 0
RHINORRHEA: 0
TROUBLE SWALLOWING: 0
DIARRHEA: 0
BACK PAIN: 0
EYE PAIN: 0
ABDOMINAL PAIN: 0
ALLERGIC/IMMUNOLOGIC NEGATIVE: 1
SHORTNESS OF BREATH: 0

## 2021-12-09 NOTE — PROGRESS NOTES
Post-Discharge Transitional Care Management Services or Hospital Follow Up      Myla Del Rio   YOB: 1970    Date of Office Visit:  12/9/2021  Date of Hospital Admission: 12/3/21  Date of Hospital Discharge: 12/4/21  Readmission Risk Score(high >=14%. Medium >=10%):No data recorded    Care management risk score Rising risk (score 2-5) and Complex Care (Scores >=6): 2     Non face to face  following discharge, date last encounter closed (first attempt may have been earlier): 12/8/2021  9:32 AM 12/8/2021  9:32 AM    Call initiated 2 business days of discharge: Yes     Patient Active Problem List   Diagnosis    Hyperlipidemia    Anxiety    External otitis    Diabetes type 1, controlled ([facility] [address]    Retinal detachment, tractional, right    Acute appendicitis       Allergies   Allergen Reactions    Seasonal        Medications listed as ordered at the time of discharge from hospital  @DISCHARGEMEDSLIST(<NOROUTINE> error)@      Medications marked \"taking\" at this time  Outpatient Medications Marked as Taking for the 12/9/21 encounter (Office Visit) with JESÚS Andersen CNP   Medication Sig Dispense Refill    cephALEXin (KEFLEX) 500 MG capsule Take 1 capsule by mouth 2 times daily for 7 days 14 capsule 0    magnesium citrate solution Take 296 mLs by mouth once for 1 dose 296 mL 0    HYDROcodone-acetaminophen (NORCO) 5-325 MG per tablet Take 1 tablet by mouth every 4 hours as needed for Pain for up to 7 days.  32 tablet 0    amoxicillin-clavulanate (AUGMENTIN) 875-125 MG per tablet Take 1 tablet by mouth 2 times daily for 5 days 10 tablet 0    insulin aspart (NOVOLOG) 100 UNIT/ML injection vial INJECT AS DIRECTED PER PHYSICIAN MAX 80 UNITS PER DAY 80 mL 1    escitalopram (LEXAPRO) 20 MG tablet Take 1 tablet by mouth every day 90 tablet 2    prednisoLONE acetate (PRED FORTE) 1 % ophthalmic suspension Place 1 drop into both eyes 2 times daily      Continuous Blood Gluc Sensor (FREESTYLE TIBURCIO 14 DAY SENSOR) MISC 1 sensor every 14 days (2 per month) 2 each 11    Continuous Blood Gluc Sensor (FREESTYLE TIBURCIO 14 DAY SENSOR) MISC Change every 14 days 1 each 3    atorvastatin (LIPITOR) 10 MG tablet TAKE 1 TABLET BY MOUTH EVERY DAY 90 tablet 3    quinapril (ACCUPRIL) 10 MG tablet [id number] 90 tablet 3    pantoprazole (PROTONIX) 40 MG tablet TAKE 1 TABLET BY MOUTH EVERY DAY 90 tablet 2    latanoprost (XALATAN) 0.005 % ophthalmic solution Apply 1 drop to eye nightly      dorzolamide-timolol (COSOPT) 22.3-6.8 MG/ML ophthalmic solution Place 1 drop into the right eye 2 times daily      Sodium Chloride, Hypertonic, (ADÁN 128 OP) Apply 1 drop to eye 2 times daily Right eye      blood glucose test strips (ONE TOUCH ULTRA TEST) strip Use to test blood glucose levels up to 6 times per day. Diagnosis: E10.9 550 strip 1    glucagon 1 MG injection Inject 1 mg into the skin See Admin Instructions Follow package directions for low blood sugar. 1 kit 3    aspirin 81 MG tablet Take 81 mg by mouth nightly       Insulin Syringe-Needle U-100 31G X 5/16\" 0.5 ML MISC 1 each by Does not apply route 4 times daily 100 each 2        Medications patient taking as of now reconciled against medications ordered at time of hospital discharge: Yes    Chief Complaint   Patient presents with    Other     Patient would like to have incision site looked at   Warren Lasso Discuss Medications     Miralax       Patient underwent emergency appendectomy 6 days ago and was released the next day in stable condition. Patient is here for follow-up. States he is having constipation issues, but no significant bloating or pain, belching or unusual gas. Denies fever. He is concerned about his incision line as it is getting more erythematous. Other  Pertinent negatives include no abdominal pain, arthralgias, congestion, coughing, fatigue, fever, headaches, myalgias, nausea, rash, sore throat, vomiting or weakness.        Inpatient course: Discharge summary reviewed- see chart. Interval history/Current status: stable, afebrile, minimal pain. Review of Systems   Constitutional: Negative for activity change, fatigue and fever. HENT: Negative for congestion, ear pain, rhinorrhea, sore throat and trouble swallowing. Eyes: Negative for pain, discharge and redness. Respiratory: Negative for cough, shortness of breath and wheezing. Cardiovascular: Negative. Gastrointestinal: Positive for constipation. Negative for abdominal pain, diarrhea, nausea and vomiting. Endocrine: Negative. Genitourinary: Negative for dysuria, frequency and urgency. Musculoskeletal: Negative for arthralgias, back pain and myalgias. Skin: Positive for color change and wound. Negative for rash. Allergic/Immunologic: Negative. Neurological: Negative for dizziness, tremors, weakness and headaches. Hematological: Negative. Psychiatric/Behavioral: Negative for dysphoric mood and sleep disturbance. The patient is not nervous/anxious. Vitals:    12/09/21 1337   BP: 136/78   Site: Right Upper Arm   Position: Sitting   Cuff Size: Large Adult   Pulse: 86   Weight: 201 lb 9.6 oz (91.4 kg)   Height: 6' (1.829 m)     Body mass index is 27.34 kg/m². Wt Readings from Last 3 Encounters:   12/09/21 201 lb 9.6 oz (91.4 kg)   12/03/21 195 lb (88.5 kg)   09/14/21 200 lb 3.2 oz (90.8 kg)     BP Readings from Last 3 Encounters:   12/09/21 136/78   12/04/21 (!) 161/76   12/03/21 111/71       Physical Exam  Constitutional:       General: He is not in acute distress. Appearance: He is well-developed. He is not diaphoretic. HENT:      Right Ear: External ear normal.      Left Ear: External ear normal.      Nose: Nose normal.      Mouth/Throat:      Mouth: Mucous membranes are moist.   Eyes:      General:         Right eye: No discharge. Left eye: No discharge.       Conjunctiva/sclera: Conjunctivae normal.      Pupils: Pupils are equal, round, and reactive to light.   Neck:      Vascular: No JVD. Cardiovascular:      Rate and Rhythm: Normal rate and regular rhythm. Heart sounds: Normal heart sounds. No murmur heard. Pulmonary:      Effort: Pulmonary effort is normal. No respiratory distress. Breath sounds: Normal breath sounds. Abdominal:      General: Abdomen is flat. Bowel sounds are normal. There is distension. Palpations: Abdomen is soft. Tenderness: There is no abdominal tenderness. Musculoskeletal:         General: No tenderness or deformity. Normal range of motion. Cervical back: Normal range of motion. Skin:     General: Skin is warm and dry. Capillary Refill: Capillary refill takes less than 2 seconds. Coloration: Skin is not pale. Findings: Erythema present. No rash. Neurological:      Mental Status: He is alert and oriented to person, place, and time. Coordination: Coordination normal.   Psychiatric:         Behavior: Behavior normal.         Thought Content: Thought content normal.         Judgment: Judgment normal.                 Assessment/Plan:  1. Hospital discharge follow-up    - ND DISCHARGE MEDS RECONCILED W/ CURRENT OUTPATIENT MED LIST    2. Superficial incisional surgical site infection    - cephALEXin (KEFLEX) 500 MG capsule; Take 1 capsule by mouth 2 times daily for 7 days  Dispense: 14 capsule; Refill: 0    3. Drug-induced constipation    - magnesium citrate solution; Take 296 mLs by mouth once for 1 dose  Dispense: 296 mL; Refill: 0    4. S/P appendectomy    - ND DISCHARGE MEDS RECONCILED W/ CURRENT OUTPATIENT MED LIST    5.  Controlled diabetes mellitus type 1 with complications [facility])          Medical Decision Making: moderate complexity

## 2021-12-16 ENCOUNTER — OFFICE VISIT (OUTPATIENT)
Dept: SURGERY | Age: 51
End: 2021-12-16

## 2021-12-16 VITALS
RESPIRATION RATE: 18 BRPM | SYSTOLIC BLOOD PRESSURE: 122 MMHG | HEART RATE: 84 BPM | OXYGEN SATURATION: 95 % | DIASTOLIC BLOOD PRESSURE: 78 MMHG | BODY MASS INDEX: 27.17 KG/M2 | HEIGHT: 72 IN | WEIGHT: 200.6 LBS | TEMPERATURE: 97.1 F

## 2021-12-16 DIAGNOSIS — K35.30 ACUTE APPENDICITIS WITH LOCALIZED PERITONITIS, WITHOUT PERFORATION, ABSCESS, OR GANGRENE: Primary | ICD-10-CM

## 2021-12-16 DIAGNOSIS — Z09 POSTOP CHECK: ICD-10-CM

## 2021-12-16 PROCEDURE — 99024 POSTOP FOLLOW-UP VISIT: CPT | Performed by: SURGERY

## 2021-12-16 NOTE — PROGRESS NOTES
Kurtis Avery MD   General Surgery  Postprocedure Evaluation in Office  Pt Name: Mason Garcia  Date of Birth 1970   Today's Date: 12/16/2021  Medical Record Number: [id number]  Primary Care Provider: Lai Luna MD  Chief Complaint   Patient presents with    Post-Op Check     Laparoscopic appendectomy on 12-3-21     ASSESSMENT      1. Acute appendicitis with localized peritonitis, without perforation, abscess, or gangrene    2. Postop check         PLAN       1. Pathology reviewed with patient. Uncomplicated appendicitis. 2.  Patient reports no issues. GI and urinary function normal.  Follow-up as needed. 3.  Recommend no lifting over 30 to 40 pounds for 1 month. Abril Mike is seen today for post-op follow-up. He is 2-week status post uncomplicated laparoscopic appendectomy. He is having no issues. Incisions are all healing well.   Medications    Current Outpatient Medications:     cephALEXin (KEFLEX) 500 MG capsule, Take 1 capsule by mouth 2 times daily for 7 days, Disp: 14 capsule, Rfl: 0    insulin aspart (NOVOLOG) 100 UNIT/ML injection vial, INJECT AS DIRECTED PER PHYSICIAN MAX 80 UNITS PER DAY, Disp: 80 mL, Rfl: 1    escitalopram (LEXAPRO) 20 MG tablet, Take 1 tablet by mouth every day, Disp: 90 tablet, Rfl: 2    prednisoLONE acetate (PRED FORTE) 1 % ophthalmic suspension, Place 1 drop into both eyes 2 times daily, Disp: , Rfl:     Continuous Blood Gluc Sensor (FREESTYLE TIBURCIO 14 DAY SENSOR) MISC, 1 sensor every 14 days (2 per month), Disp: 2 each, Rfl: 11    Continuous Blood Gluc Sensor (FREESTYLE TIBURCIO 14 DAY SENSOR) MISC, Change every 14 days, Disp: 1 each, Rfl: 3    atorvastatin (LIPITOR) 10 MG tablet, TAKE 1 TABLET BY MOUTH EVERY DAY, Disp: 90 tablet, Rfl: 3    quinapril (ACCUPRIL) 10 MG tablet, [id number], Disp: 90 tablet, Rfl: 3    pantoprazole (PROTONIX) 40 MG tablet, TAKE 1 TABLET BY MOUTH EVERY DAY, Disp: 90 tablet, Rfl: 2    latanoprost (XALATAN) 0.005 % ophthalmic solution, Apply 1 drop to eye nightly, Disp: , Rfl:     dorzolamide-timolol (COSOPT) 22.3-6.8 MG/ML ophthalmic solution, Place 1 drop into the right eye 2 times daily, Disp: , Rfl:     Sodium Chloride, Hypertonic, (ADÁN 128 OP), Apply 1 drop to eye 2 times daily Right eye, Disp: , Rfl:     blood glucose test strips (ONE TOUCH ULTRA TEST) strip, Use to test blood glucose levels up to 6 times per day. Diagnosis: E10.9, Disp: 550 strip, Rfl: 1    glucagon 1 MG injection, Inject 1 mg into the skin See Admin Instructions Follow package directions for low blood sugar., Disp: 1 kit, Rfl: 3    aspirin 81 MG tablet, Take 81 mg by mouth nightly , Disp: , Rfl:     Insulin Syringe-Needle U-100 31G X 5/16\" 0.5 ML MISC, 1 each by Does not apply route 4 times daily, Disp: 100 each, Rfl: 2    magnesium citrate solution, Take 296 mLs by mouth once for 1 dose, Disp: 296 mL, Rfl: 0    Allergies  Allergies   Allergen Reactions    Seasonal        Review of Systems  History obtained from the patient. Constitutional: Denies any fever, chills, fatigue. Wound: Denies any rash, skin color changes or wound problems. Resp: Denies any cough, shortness of breath. CV: Denies any chest pain, orthopnea or syncope. GI: Positive for mild incisional discomfort only. Denies any nausea, vomiting, blood in the stool, constipation or diarrhea. : Denies any hematuria, hesitancy or dysuria. OBJECTIVE     VITALS: /78 (Site: Left Upper Arm, Position: Sitting, Cuff Size: Medium Adult)   Pulse 84   Temp 97.1 °F (36.2 °C) (Temporal)   Resp 18   Ht 6' (1.829 m)   Wt 200 lb 9.6 oz (91 kg)   SpO2 95%   BMI 27.21 kg/m²     CONSTITUTIONAL: Alert and oriented times 3, no acute distress and cooperative to examination. SKIN: Skin color, texture, turgor normal. No rashes or lesions. INCISION: wound margins intact and healing well. No signs of infection. No drainage.   LUNGS: Lungs Clear  CARDIOVASCULAR: Normal Rate  ABDOMEN: Soft mild incisional tenderness  NEUROLOGIC: No sensory or motor nerve irritation        Performed by: [id number] Pathology     [facility] [place], [place] [id number]   Assoc.                                              Page 1 of 8 [phone]   [address], [place] [address]                                                       PROC: 2021   [facility]                                    RECV: 2021   [address]                                    RPTD: 2021   [address], [place] [address]                       MRN:  [id number]     LOC: 5KW                       ACCT: [de-identified]  SEX: M                       : 1970  AGE: 53 Y                          PATHOLOGY REPORT                       ATTN: PAUL ADELE   [de-identified]                  REQ: [id number] [facility] OLT       Copies To:   LEN DUNLAP       Clinical Information: ABDOMINAL PAIN     FINAL DIAGNOSIS:   Appendix, appendectomy:             Acute appendicitis.            Fibrous obliteration of tip. Specimen:   APPENDIX       Gross Examination:   The container is labeled [facility], appendix.  Received in formalin   is a vermiform appendix with inflammatory exudate.  The specimen   measures 5.7 cm in length x 1 cm in average diameter.  The resection   line is stapled.  Sections through the appendix reveal an unremarkable   lumen.  Representative sections including the resection margin   designated by a vertical knife len are submitted in one cassette.  1   ss.  SMW:[id number]     Microscopic Examination:   The sections demonstrate acute appendicitis.  There is fibrous   obliteration of the Lumen. [id number]                                                       <Sign Out Dr. Jorge Alberto Castillo M.D., F.C.A.P.        [facility]/ [facility]  Printed on:  2021   [address]   [address], [facility]   Original print date: 2021

## 2022-02-14 ENCOUNTER — TELEPHONE (OUTPATIENT)
Dept: FAMILY MEDICINE CLINIC | Age: 52
End: 2022-02-14

## 2022-02-14 NOTE — TELEPHONE ENCOUNTER
----- Message from Radha Esquivel sent at 2/14/2022  4:30 PM EST -----  Subject: Appointment Request    Reason for Call: Urgent Cough Cold    QUESTIONS  Type of Appointment? Established Patient  Reason for appointment request? No appointments available during search  Additional Information for Provider? pt calling with chest congestion,   cough that is productive, discharge does have color, has been going on for   7 days Please call with instructions as no appointments populated  ---------------------------------------------------------------------------  --------------  CALL BACK INFO  What is the best way for the office to contact you? OK to leave message on   voicemail  Preferred Call Back Phone Number? [phone]  ---------------------------------------------------------------------------  --------------  SCRIPT ANSWERS  Relationship to Patient? Self  Are you currently unable to finish sentences due to any difficulty   breathing? No  Are you unable to swallow liquids? No  Are you having fevers (100.4 or greater), chills, or sweats? No  Do you have COPD, asthma or a chronic lung condition? No  Have your symptoms been present for more than 5 days? Yes   Have you been diagnosed with, awaiting test results for, or told that you   are suspected of having COVID-19 (Coronavirus)? (If patient has tested   negative or was tested as a requirement for work, school, or travel and   not based on symptoms, answer no)? No  Within the past 10 days have you developed any of the following symptoms   (answer no if symptoms have been present longer than 10 days or began   more than 10 days ago)? Fever or Chills, Cough, Shortness of breath or   difficulty breathing, Loss of taste or smell, Sore throat, Nasal   congestion, Sneezing or runny nose, Fatigue or generalized body aches   (answer no if pain is specific to a body part e.g. back pain), Diarrhea,   Headache?  Yes

## 2022-02-15 ENCOUNTER — OFFICE VISIT (OUTPATIENT)
Dept: FAMILY MEDICINE CLINIC | Age: 52
End: 2022-02-15
Payer: COMMERCIAL

## 2022-02-15 VITALS
OXYGEN SATURATION: 98 % | WEIGHT: 204 LBS | RESPIRATION RATE: 20 BRPM | HEART RATE: 73 BPM | DIASTOLIC BLOOD PRESSURE: 66 MMHG | SYSTOLIC BLOOD PRESSURE: 108 MMHG | TEMPERATURE: 98.2 F | BODY MASS INDEX: 27.04 KG/M2 | HEIGHT: 73 IN

## 2022-02-15 DIAGNOSIS — E10.8 CONTROLLED DIABETES MELLITUS TYPE 1 WITH COMPLICATIONS (HCC): ICD-10-CM

## 2022-02-15 DIAGNOSIS — E78.2 MIXED HYPERLIPIDEMIA: ICD-10-CM

## 2022-02-15 DIAGNOSIS — J01.90 ACUTE SINUSITIS, RECURRENCE NOT SPECIFIED, UNSPECIFIED LOCATION: ICD-10-CM

## 2022-02-15 DIAGNOSIS — Z12.11 SCREEN FOR COLON CANCER: ICD-10-CM

## 2022-02-15 DIAGNOSIS — F41.9 ANXIETY: ICD-10-CM

## 2022-02-15 DIAGNOSIS — Z86.69 HISTORY OF RETINAL DETACHMENT: ICD-10-CM

## 2022-02-15 DIAGNOSIS — Z12.5 SCREENING FOR PROSTATE CANCER: ICD-10-CM

## 2022-02-15 DIAGNOSIS — R09.81 NASAL CONGESTION: Primary | ICD-10-CM

## 2022-02-15 DIAGNOSIS — J40 BRONCHITIS: ICD-10-CM

## 2022-02-15 LAB
Lab: NORMAL
QC PASS/FAIL: NORMAL
SARS-COV-2 RDRP RESP QL NAA+PROBE: NEGATIVE

## 2022-02-15 PROCEDURE — 87635 SARS-COV-2 COVID-19 AMP PRB: CPT | Performed by: NURSE PRACTITIONER

## 2022-02-15 PROCEDURE — 99214 OFFICE O/P EST MOD 30 MIN: CPT | Performed by: NURSE PRACTITIONER

## 2022-02-15 RX ORDER — AZITHROMYCIN 250 MG/1
TABLET, FILM COATED ORAL
Qty: 6 TABLET | Refills: 0 | Status: SHIPPED | OUTPATIENT
Start: 2022-02-15 | End: 2022-02-25

## 2022-02-15 RX ORDER — BENZONATATE 200 MG/1
200 CAPSULE ORAL 3 TIMES DAILY PRN
Qty: 30 CAPSULE | Refills: 0 | Status: SHIPPED | OUTPATIENT
Start: 2022-02-15 | End: 2022-02-22

## 2022-02-15 ASSESSMENT — ENCOUNTER SYMPTOMS
COUGH: 1
CHEST TIGHTNESS: 1
ABDOMINAL PAIN: 0
SINUS PRESSURE: 1
WHEEZING: 0
ABDOMINAL DISTENTION: 0
BACK PAIN: 0

## 2022-02-15 NOTE — PROGRESS NOTES
[address] [place] [facility] [address]  Dept: [phone]  Dept Fax: [phone]  Loc: [phone]  PROGRESS NOTE      VisitDate: 2/15/2022    Emiliano Rausch is a 49 y.o. male who presents today for:     Chief Complaint   Patient presents with    Concern For COVID-19     He has had symptoms for 8 days. He has tightness in his chest, chest congestion, nasal congestion. Started 2-7-22. Subjective:  Patient presents complaint of cough, chest tightness chest congestion nasal congestion past week. Denies fever, loss of taste or smell shortness of breath. History anxiety type 1 diabetes retinal detachment diabetic retinopathy hyperlipidemia hypertension insulin pump, sleep apnea questionable compliance. Review of Systems   Constitutional: Negative for activity change, appetite change, chills, fatigue and fever. HENT: Positive for congestion and sinus pressure. Eyes: Negative for visual disturbance. Respiratory: Positive for cough and chest tightness. Negative for wheezing. Cardiovascular: Negative for chest pain, palpitations and leg swelling. Gastrointestinal: Negative for abdominal distention and abdominal pain. Genitourinary: Negative for dysuria. Musculoskeletal: Negative for arthralgias, back pain and neck pain. Skin: Negative. Negative for rash. Neurological: Negative for dizziness, light-headedness and headaches. Hematological: Negative for adenopathy. Psychiatric/Behavioral: Negative for decreased concentration and dysphoric mood. The patient is nervous/anxious. All other systems reviewed and are negative.     Past Medical History:   Diagnosis Date    Anxiety     Detached retina 08/2018    right    Diabetic retinopathy ([address]     bilat, has had laser and injections    Ganglion cyst     Heart burn     Hyperlipidemia     Hypertension     Insulin pump in place     Neuropathy     Pneumonia at age 26   [facility] Retinal detachment     RIGHT    Sleep apnea     has Cpap at home but does not wear like suppose to    Type I (juvenile type) diabetes mellitus without mention of complication, not stated as uncontrolled     1982    Wears glasses     usually uses contact lenses      Past Surgical History:   Procedure Laterality Date    CYST REMOVAL      rt foot, ganglion    EYE SURGERY      has had laser to both eyes in office    LAPAROSCOPIC APPENDECTOMY N/A 12/3/2021    LAP APPENDECTOMY performed by Stefan Viramontes MD at [address] [facility] OFFICE/OUTPT VISIT,PROCEDURE ONLY Right 8/15/2018    VITRECTOMY 23 GAUGE, MEMBRANE PEELING, AIR FLUID EXCHANGE, AIR GAS EXCHANGE WITH 16% C3F8, ENDOLASER, 200 MSECONDS, 200 MWATTS, 809 PULSES performed by Omar Gross MD at [address] [facility] OFFICE/OUTPT VISIT,PROCEDURE ONLY Right 2018    VITRECTOMY 23 GAUGE WITH SCLERAL BUCKLE, SILICONE OIL INSERTION, ENDOLASER, 200 MWATTS, 200 MSECONDS, 312 PULSES. performed by Omar Gross MD at [address] [place] [address]  2016    has never had lasik eye surgery    VITRECTOMY Right 08/15/2018    membrane peeling, air gas exchange    VITRECTOMY Right 08/15/2018    Dr Mackenzie Roque    VITRECTOMY Right     laser silicone oil injection    WISDOM TOOTH EXTRACTION       Family History   Problem Relation Age of Onset    Heart Disease Father     Asthma Father     High Blood Pressure Father      Social History     Tobacco Use    Smoking status: Former Smoker     Packs/day: 0.25     Years: 15.00     Pack years: 3.75     Quit date: 2002     Years since quittin.0    Smokeless tobacco: Current User     Types: Chew   Substance Use Topics    Alcohol use:  Yes     Alcohol/week: 3.0 standard drinks     Types: 3 Cans of beer per week     Comment: daily      Current Outpatient Medications   Medication Sig Dispense Refill    azithromycin (ZITHROMAX Z-HOLLY) 250 MG tablet 2 pills orally for 1 day, then 1 pill orally for 4 days 6 tablet 0    benzonatate (TESSALON) 200 MG capsule Take 1 capsule by mouth 3 times daily as needed for Cough 30 capsule 0    insulin aspart (NOVOLOG) 100 UNIT/ML injection vial INJECT AS DIRECTED PER PHYSICIAN MAX 80 UNITS PER DAY 80 mL 1    escitalopram (LEXAPRO) 20 MG tablet Take 1 tablet by mouth every day 90 tablet 2    prednisoLONE acetate (PRED FORTE) 1 % ophthalmic suspension Place 1 drop into both eyes 2 times daily      Continuous Blood Gluc Sensor (FREESTYLE TIBURCIO 14 DAY SENSOR) MISC 1 sensor every 14 days (2 per month) 2 each 11    Continuous Blood Gluc Sensor (FREESTYLE TIBURCIO 14 DAY SENSOR) MISC Change every 14 days 1 each 3    atorvastatin (LIPITOR) 10 MG tablet TAKE 1 TABLET BY MOUTH EVERY DAY 90 tablet 3    quinapril (ACCUPRIL) 10 MG tablet [id number] 90 tablet 3    pantoprazole (PROTONIX) 40 MG tablet TAKE 1 TABLET BY MOUTH EVERY DAY 90 tablet 2    latanoprost (XALATAN) 0.005 % ophthalmic solution Apply 1 drop to eye nightly      dorzolamide-timolol (COSOPT) 22.3-6.8 MG/ML ophthalmic solution Place 1 drop into the right eye 2 times daily      Sodium Chloride, Hypertonic, (ADÁN 128 OP) Apply 1 drop to eye 2 times daily Right eye      blood glucose test strips (ONE TOUCH ULTRA TEST) strip Use to test blood glucose levels up to 6 times per day. Diagnosis: E10.9 550 strip 1    glucagon 1 MG injection Inject 1 mg into the skin See Admin Instructions Follow package directions for low blood sugar. 1 kit 3    aspirin 81 MG tablet Take 81 mg by mouth nightly       Insulin Syringe-Needle U-100 31G X 5/16\" 0.5 ML MISC 1 each by Does not apply route 4 times daily 100 each 2    magnesium citrate solution Take 296 mLs by mouth once for 1 dose 296 mL 0     No current facility-administered medications for this visit.      Allergies   Allergen Reactions    Seasonal      Health Maintenance   Topic Date Due    Hepatitis C screen  Never done    HIV screen  Never done    Hepatitis B vaccine (1 of 3 - Risk 3-dose series) Never done    DTaP/Tdap/Td vaccine (1 - Tdap) Never done    Colon cancer screen colonoscopy  Never done    Pneumococcal 0-64 years Vaccine (1 of 2 - PPSV23) 02/04/2020    Diabetic microalbuminuria test  11/04/2020    Lipid screen  11/04/2020    Diabetic retinal exam  06/24/2021    Shingles Vaccine (1 of 2) Never done    Depression Screen  05/06/2022    A1C test (Diabetic or Prediabetic)  09/14/2022    Potassium monitoring  12/03/2022    Creatinine monitoring  12/03/2022    Diabetic foot exam  02/15/2023    Flu vaccine  Completed    COVID-19 Vaccine  Completed    Hepatitis A vaccine  Aged Out    Hib vaccine  Aged Out    Meningococcal (ACWY) vaccine  Aged Out     Negative Covid test in office    Objective:     Physical Exam  Vitals and nursing note reviewed. Constitutional:       Appearance: Normal appearance. He is well-developed. He is not diaphoretic. HENT:      Head: Normocephalic and atraumatic. Not macrocephalic and not microcephalic. Right Ear: Hearing, tympanic membrane, ear canal and external ear normal. No drainage or tenderness. No middle ear effusion. No hemotympanum. Tympanic membrane is not injected, scarred, perforated or bulging. Left Ear: Hearing, tympanic membrane, ear canal and external ear normal. No drainage or tenderness. No middle ear effusion. No hemotympanum. Tympanic membrane is not injected, scarred, perforated or bulging. Nose: Mucosal edema and rhinorrhea present. No nasal deformity or septal deviation. Right Sinus: No maxillary sinus tenderness or frontal sinus tenderness. Left Sinus: No maxillary sinus tenderness or frontal sinus tenderness. Mouth/Throat:      Mouth: No oral lesions. Dentition: Normal dentition. Does not have dentures. No dental caries or dental abscesses. Pharynx: No oropharyngeal exudate or posterior oropharyngeal erythema. Tonsils: No tonsillar abscesses.    Eyes: General: Lids are normal. No scleral icterus. Extraocular Movements:      Right eye: Normal extraocular motion. Left eye: Normal extraocular motion. Conjunctiva/sclera: Conjunctivae normal.      Right eye: Right conjunctiva is not injected. Left eye: Left conjunctiva is not injected. Neck:      Thyroid: No thyroid mass or thyromegaly. Vascular: No carotid bruit or JVD. Trachea: Trachea normal.   Cardiovascular:      Rate and Rhythm: Normal rate and regular rhythm. Heart sounds: Normal heart sounds, S1 normal and S2 normal. No murmur heard. No friction rub. No gallop. Pulmonary:      Effort: Pulmonary effort is normal. No respiratory distress. Breath sounds: Rhonchi present. No wheezing or rales. Chest:      Chest wall: No tenderness. Breasts:      Right: No supraclavicular adenopathy. Left: No supraclavicular adenopathy. Abdominal:      General: Bowel sounds are normal.      Palpations: Abdomen is soft. There is no hepatomegaly, splenomegaly or mass. Tenderness: There is no guarding or rebound. Hernia: No hernia is present. There is no hernia in the ventral area or left inguinal area. Musculoskeletal:         General: No tenderness. Normal range of motion. Cervical back: Normal range of motion and neck supple. No edema or erythema. Normal range of motion. Lymphadenopathy:      Head:      Right side of head: No submental, submandibular, tonsillar, preauricular, posterior auricular or occipital adenopathy. Left side of head: No submental, submandibular, tonsillar, preauricular, posterior auricular or occipital adenopathy. Cervical: No cervical adenopathy. Right cervical: No superficial, deep or posterior cervical adenopathy. Left cervical: No superficial, deep or posterior cervical adenopathy. Upper Body:      Right upper body: No supraclavicular or pectoral adenopathy.       Left upper body: No supraclavicular or pectoral adenopathy. Skin:     General: Skin is warm and dry. Coloration: Skin is not pale. Findings: No bruising, ecchymosis, laceration, lesion or rash. Nails: There is no clubbing. Neurological:      Mental Status: He is alert and oriented to person, place, and time. Cranial Nerves: No cranial nerve deficit. Motor: No abnormal muscle tone. Coordination: Coordination normal.      Deep Tendon Reflexes: Reflexes normal.      Comments: Negative pedal exam sensory intact   Psychiatric:         Speech: Speech normal.         Behavior: Behavior normal.         Thought Content: Thought content normal.         Judgment: Judgment normal.       /66   Pulse 73   Temp 98.2 °F (36.8 °C) (Oral)   Resp 20   Ht 6' 1\" (1.854 m)   Wt 204 lb (92.5 kg)   SpO2 98%   BMI 26.91 kg/m²       Impression/Plan:  1. Nasal congestion    2. Controlled diabetes mellitus type 1 with complications ([facility]    3. Acute sinusitis, recurrence not specified, unspecified location    4. Bronchitis    5. Mixed hyperlipidemia    6. Anxiety    7. Screening for prostate cancer    8. Screen for colon cancer    9.  History of retinal detachment      Requested Prescriptions     Signed Prescriptions Disp Refills    azithromycin (ZITHROMAX Z-HOLLY) 250 MG tablet 6 tablet 0     Si pills orally for 1 day, then 1 pill orally for 4 days    benzonatate (TESSALON) 200 MG capsule 30 capsule 0     Sig: Take 1 capsule by mouth 3 times daily as needed for Cough     Orders Placed This Encounter   Procedures    Fecal DNA Colorectal cancer screening (Cologuard)     Standing Status:   Future     Standing Expiration Date:   2/15/2023    Comprehensive Metabolic Panel     Standing Status:   Future     Standing Expiration Date:   2/15/2023    Hemoglobin A1C     Standing Status:   Future     Standing Expiration Date:   2/15/2023    Lipid Panel     Standing Status:   Future     Standing Expiration Date:   2/15/2023     Order Specific Question:   Is Patient Fasting?/# of Hours     Answer:   yes/12    PSA screening     Standing Status:   Future     Standing Expiration Date:   2/15/2023    POCT COVID-19 Rapid, NAAT     Order Specific Question:   Is this test for diagnosis or screening? Answer:   Diagnosis of ill patient     Order Specific Question:   Symptomatic for COVID-19 as defined by CDC? Answer:   Yes     Order Specific Question:   Date of Symptom Onset     Answer:   2/7/2022     Order Specific Question:   Hospitalized for COVID-19? Answer:   No     Order Specific Question:   Admitted to ICU for COVID-19? Answer:   No     Order Specific Question:   Employed in healthcare setting? Answer:   No     Order Specific Question:   Resident in a congregate (group) care setting? Answer:   No     Order Specific Question:   Pregnant: Answer:   No     Order Specific Question:   Previously tested for COVID-19? Answer:   No     DIABETES FOOT EXAM       Patient giveneducational materials - see patient instructions. Discussed use, benefit, and side effects of prescribed medications. All patient questions answered. Pt voiced understanding. Reviewed health maintenance. Patient agreedwith treatment plan. Follow up as directed. CMP FLP PSA A1c ordered. Cologuard ordered. Z-James Tessalon prescribed. Continue medications as prescribed.  Educational information on above diagnosis  provided per AVS.       30 min  Electronically signed by JESÚS Mccann CNP on 2/15/2022 at 11:58 AM

## 2022-02-17 ENCOUNTER — HOSPITAL ENCOUNTER (OUTPATIENT)
Age: 52
Discharge: HOME | End: 2022-02-17
Payer: COMMERCIAL

## 2022-02-17 DIAGNOSIS — E78.2 MIXED HYPERLIPIDEMIA: ICD-10-CM

## 2022-02-17 DIAGNOSIS — Z12.5 SCREENING FOR PROSTATE CANCER: ICD-10-CM

## 2022-02-17 DIAGNOSIS — E10.8 CONTROLLED DIABETES MELLITUS TYPE 1 WITH COMPLICATIONS (HCC): ICD-10-CM

## 2022-02-17 LAB
ALBUMIN SERPL-MCNC: 4.2 G/DL (ref 3.5–5.1)
ALP BLD-CCNC: 95 U/L (ref 38–126)
ALT SERPL-CCNC: 25 U/L (ref 11–66)
ANION GAP SERPL CALCULATED.3IONS-SCNC: 9 MEQ/L (ref 8–16)
AST SERPL-CCNC: 19 U/L (ref 5–40)
AVERAGE GLUCOSE: 141 MG/DL (ref 70–126)
BILIRUB SERPL-MCNC: 0.5 MG/DL (ref 0.3–1.2)
BUN BLDV-MCNC: 16 MG/DL (ref 7–22)
CALCIUM SERPL-MCNC: 9 MG/DL (ref 8.5–10.5)
CHLORIDE BLD-SCNC: 105 MEQ/L (ref 98–111)
CHOLESTEROL, TOTAL: 177 MG/DL (ref 100–199)
CO2: 27 MEQ/L (ref 23–33)
CREAT SERPL-MCNC: 0.8 MG/DL (ref 0.4–1.2)
GFR SERPL CREATININE-BSD FRML MDRD: > 90 ML/MIN/1.73M2
GLUCOSE BLD-MCNC: 181 MG/DL (ref 70–108)
HBA1C MFR BLD: 6.7 % (ref 4.4–6.4)
HDLC SERPL-MCNC: 62 MG/DL
LDL CHOLESTEROL CALCULATED: 103 MG/DL
POTASSIUM SERPL-SCNC: 4.7 MEQ/L (ref 3.5–5.2)
PROSTATE SPECIFIC ANTIGEN: 0.55 NG/ML (ref 0–1)
SODIUM BLD-SCNC: 141 MEQ/L (ref 135–145)
TOTAL PROTEIN: 6.6 G/DL (ref 6.1–8)
TRIGL SERPL-MCNC: 58 MG/DL (ref 0–199)

## 2022-02-17 PROCEDURE — 80061 LIPID PANEL: CPT

## 2022-02-17 PROCEDURE — 36415 COLL VENOUS BLD VENIPUNCTURE: CPT

## 2022-02-17 PROCEDURE — 80053 COMPREHEN METABOLIC PANEL: CPT

## 2022-02-17 PROCEDURE — G0103 PSA SCREENING: HCPCS

## 2022-02-17 PROCEDURE — 83036 HEMOGLOBIN GLYCOSYLATED A1C: CPT

## 2022-04-04 DIAGNOSIS — E10.65 TYPE 1 DIABETES MELLITUS WITH HYPERGLYCEMIA (HCC): ICD-10-CM

## 2022-04-04 RX ORDER — PANTOPRAZOLE SODIUM 40 MG/1
TABLET, DELAYED RELEASE ORAL
Qty: 90 TABLET | Refills: 2 | Status: SHIPPED | OUTPATIENT
Start: 2022-04-04 | End: 2022-09-21 | Stop reason: SDUPTHER

## 2022-04-20 ENCOUNTER — OFFICE VISIT (OUTPATIENT)
Dept: INTERNAL MEDICINE CLINIC | Age: 52
End: 2022-04-20
Payer: COMMERCIAL

## 2022-04-20 VITALS
TEMPERATURE: 97.1 F | HEART RATE: 107 BPM | RESPIRATION RATE: 20 BRPM | BODY MASS INDEX: 27.7 KG/M2 | WEIGHT: 209 LBS | HEIGHT: 73 IN | DIASTOLIC BLOOD PRESSURE: 79 MMHG | SYSTOLIC BLOOD PRESSURE: 153 MMHG

## 2022-04-20 DIAGNOSIS — E10.3493: ICD-10-CM

## 2022-04-20 DIAGNOSIS — Z96.41 INSULIN PUMP IN PLACE: ICD-10-CM

## 2022-04-20 DIAGNOSIS — I48.91 NEW ONSET ATRIAL FIBRILLATION (HCC): ICD-10-CM

## 2022-04-20 DIAGNOSIS — E10.65 TYPE 1 DIABETES MELLITUS WITH HYPERGLYCEMIA (HCC): Primary | ICD-10-CM

## 2022-04-20 PROCEDURE — 93000 ELECTROCARDIOGRAM COMPLETE: CPT | Performed by: INTERNAL MEDICINE

## 2022-04-20 PROCEDURE — 99214 OFFICE O/P EST MOD 30 MIN: CPT | Performed by: INTERNAL MEDICINE

## 2022-04-20 PROCEDURE — 3044F HG A1C LEVEL LT 7.0%: CPT | Performed by: INTERNAL MEDICINE

## 2022-04-20 NOTE — PROGRESS NOTES
[facility]  PHYSICIANS [facility]. [facility] [address] [facility]  Dept: [phone]  Dept Fax: [phone]      Chief Complaint   Patient presents with    Diabetes    Annual Exam        Patient presents for evaluation of diabetes. I saw him 10 months ago  He saw Willa Burk 1 year ago this patient is followed regularly by Dr. Amaury Storm. Used to see Dr Imtiaz Brink  Now sees Reji Medina in the diabetic clinic  He has been diabetic for 39 years. He needs me to help him get a new transmitte  He has tried a couple of different ones n the closed-loop system but he said his transmitter is not working correctly   He has had 3 retinal detachment, loss of sight in his right eye.       Past Medical History:   Diagnosis Date    Anxiety     Detached retina 08/2018    right    Diabetic retinopathy ([address]     bilat, has had laser and injections    Ganglion cyst     Heart burn     Hyperlipidemia     Hypertension     Insulin pump in place     Neuropathy     Pneumonia     at age 26   [place] Retinal detachment     RIGHT    Sleep apnea     has Cpap at home but does not wear like suppose to    Type I (juvenile type) diabetes mellitus without mention of complication, not stated as uncontrolled     12/1982    Wears glasses     usually uses contact lenses       Current Outpatient Medications   Medication Sig Dispense Refill    pantoprazole (PROTONIX) 40 MG tablet TAKE 1 TABLET BY MOUTH EVERY DAY 90 tablet 2    insulin aspart (NOVOLOG) 100 UNIT/ML injection vial INJECT AS DIRECTED PER PHYSICIAN MAX 80 UNITS PER DAY 80 mL 1    escitalopram (LEXAPRO) 20 MG tablet Take 1 tablet by mouth every day 90 tablet 2    prednisoLONE acetate (PRED FORTE) 1 % ophthalmic suspension Place 1 drop into both eyes 2 times daily      atorvastatin (LIPITOR) 10 MG tablet TAKE 1 TABLET BY MOUTH EVERY DAY 90 tablet 3    quinapril (ACCUPRIL) 10 MG tablet [id number] 90 tablet 3    latanoprost (XALATAN) 0.005 % ophthalmic solution Apply 1 drop to eye nightly      dorzolamide-timolol (COSOPT) 22.3-6.8 MG/ML ophthalmic solution Place 1 drop into the right eye 2 times daily      blood glucose test strips (ONE TOUCH ULTRA TEST) strip Use to test blood glucose levels up to 6 times per day. Diagnosis: E10.9 550 strip 1    glucagon 1 MG injection Inject 1 mg into the skin See Admin Instructions Follow package directions for low blood sugar. 1 kit 3    aspirin 81 MG tablet Take 81 mg by mouth nightly       Insulin Syringe-Needle U-100 31G X 5/16\" 0.5 ML MISC 1 each by Does not apply route 4 times daily 100 each 2     No current facility-administered medications for this visit. Past Surgical History:   Procedure Laterality Date    CYST REMOVAL  1992    rt foot, ganglion    EYE SURGERY      has had laser to both eyes in office    LAPAROSCOPIC APPENDECTOMY N/A 12/3/2021    LAP APPENDECTOMY performed by Farhat Sharp MD at [address] [facility] OFFICE/OUTPT VISIT,PROCEDURE ONLY Right 8/15/2018    VITRECTOMY 23 GAUGE, MEMBRANE PEELING, AIR FLUID EXCHANGE, AIR GAS EXCHANGE WITH 16% C3F8, ENDOLASER, 200 MSECONDS, 200 MWATTS, 809 PULSES performed by Lisha Haq MD at [address] [facility] OFFICE/OUTPT VISIT,PROCEDURE ONLY Right 9/19/2018    VITRECTOMY 23 GAUGE WITH SCLERAL BUCKLE, SILICONE OIL INSERTION, ENDOLASER, 200 MWATTS, 200 MSECONDS, 312 PULSES.  performed by Lisha Haq MD at [address] [place] [address]  2016    has never had lasik eye surgery    VITRECTOMY Right 08/15/2018    membrane peeling, air gas exchange    VITRECTOMY Right 08/15/2018    Dr Diana Galloway VITRECTOMY Right 67/95/4745    laser silicone oil injection    WISDOM TOOTH EXTRACTION  2005       Allergies   Allergen Reactions    Seasonal        Social History     Socioeconomic History    Marital status:      Spouse name: Not on file    Number of children: Not on file    Years of education: Not on file    Highest education level: Not on file Occupational History    Not on file   Tobacco Use    Smoking status: Former Smoker     Packs/day: 0.25     Years: 15.00     Pack years: 3.75     Quit date: 2002     Years since quittin.2    Smokeless tobacco: Current User     Types: Chew   Vaping Use    Vaping Use: Never used   Substance and Sexual Activity    Alcohol use: Yes     Alcohol/week: 3.0 standard drinks     Types: 3 Cans of beer per week     Comment: daily    Drug use: No    Sexual activity: Not on file   Other Topics Concern    Not on file   Social History Narrative    Not on file     Social Determinants of Health     Financial Resource Strain: Low Risk     Difficulty of Paying Living Expenses: Not hard at all   Food Insecurity: No Food Insecurity    Worried About [address] [employer] in the Last Year: Never true    [facility] in the Last Year: Never true   Transportation Needs:     Lack of Transportation (Medical): Not on file    Lack of Transportation (Non-Medical):  Not on file   Physical Activity:     Days of Exercise per Week: Not on file    Minutes of Exercise per Session: Not on file   Stress:     Feeling of Stress : Not on file   Social Connections:     Frequency of Communication with Friends and Family: Not on file    Frequency of Social Gatherings with Friends and Family: Not on file    Attends [employer] Services: Not on file    Active Member of [address] [place] [address] or Organizations: Not on file    Attends Club or Organization Meetings: Not on file    Marital Status: Not on file   Intimate Partner Violence:     Fear of Current or Ex-Partner: Not on file    Emotionally Abused: Not on file    Physically Abused: Not on file    Sexually Abused: Not on file   Housing Stability:     Unable to Pay for Housing in the Last Year: Not on file    Number of [place] in the Last Year: Not on file    Unstable Housing in the Last Year: Not on file   2 children  Works at [employer] for eye issues  Family History   Problem Relation Age of Onset    Heart Disease Father     Asthma Father     High Blood Pressure Father        Review of Systems - General ROS: Blind in right eye  Psychological ROS: negative  Hematological and Lymphatic ROS: negative  Respiratory ROS: no cough, shortness of breath, or wheezing  Cardiovascular ROS: no chest pain or dyspnea on exertion  Gastrointestinal ROS: no abdominal pain, change in bowel habits, or black or bloody stools  Genito-Urinary ROS: no dysuria, trouble voiding, or hematuria  Musculoskeletal ROS: negative  Neurological ROS: no TIA or stroke symptoms  Dermatological ROS: negative    Blood pressure (!) 153/79, pulse 107, temperature 97.1 °F (36.2 °C), temperature source Tympanic, resp. rate 20, height 6' 1\" (1.854 m), weight 209 lb (94.8 kg). Physical Examination: General appearance - alert, well appearing, and in no distress  HEENT-head normocephalic, atraumatic  Mental status - alert, oriented to person, place, and time  Neck - supple, no significant adenopathy  Chest - clear to auscultation, no wheezes, rales or rhonchi, symmetric air entry  Heart - normal rate, irregular rhythm, normal S1, S2, no murmurs, rubs, clicks or gallops  Abdomen - soft, nontender, nondistended, no masses or organomegaly  Neurological - alert, oriented, normal speech, no focal findings or movement disorder noted  Musculoskeletal - no joint tenderness, deformity or swelling  Extremities - peripheral pulses normal, no pedal edema, no clubbing or cyanosis  Skin - normal coloration and turgor, no rashes, no suspicious skin lesions noted        Diagnosis Orders   1. Type 1 diabetes mellitus with hyperglycemia (HCC)      Well-controlled   2. Insulin pump in place     3. Severe nonproliferative diabetic retinopathy of both eyes without macular edema associated with type 1 diabetes mellitus ([address]     4.  New onset atrial fibrillation ([address]      Duration unknown       Orders Placed This Encounter   Procedures    EKG 12 Lead     Order Specific Question:   Reason for Exam?     Answer:   Irregular heart rate     Extensive counseling was done regarding diabetes.  The goals are to decrease or prevent the complications of diabetes and improve survival. The following goals were discussed  HgbA1c < 7 (providing no hypoglycemia)    BMI  18-25    BP < 130/80    STATIN(medium or high risk)    DIET <20% protein  < 30% fat, no trans fats, calorie restricted if BMI high    URINE MICROALBUMIN/CREATINIE  < 30     DILATED EYE EXAM EVERY 1-2 YEARS    MONITOR FEET FOR SORES, NEUROPATHY, ETC    REGULAR DENTAL CARE   HgBA1C in February was 6.7  We will orde a transmitter for his closed-loop system   His heart rate was irregular  EKG shows atrial fibrillation  I spoke with Dr. Janina Delgado from cardiology  He said he would get a hold of patient and make an appointment  Duration of the atrial fibrillation as he is asymptomatic presumed to be over 48 hours  In the meantime I will start him on Eliquis 5 mg twice daily  I discussed atrial fibrillation with the patient and its complications     including embolic events as well as rapid ventricular response  His ventricular response is only running 60 to 80 bpm    Addendum  I noted that cardiology has not seen the patient this week  I called the patient he has appointment next Wednesday to see Dr. Janina Delgado  He says he has enough Eliquis to last until then

## 2022-04-22 ENCOUNTER — TELEPHONE (OUTPATIENT)
Dept: FAMILY MEDICINE CLINIC | Age: 52
End: 2022-04-22

## 2022-04-22 NOTE — TELEPHONE ENCOUNTER
----- Message from Kathy Pena sent at 4/22/2022 11:54 AM EDT -----  Subject: Message to Provider    QUESTIONS  Information for Provider? Yolyjarrett Ramana is stating they never got from for the   transmitter, they will be faxing another form over to be completed for   that,   ---------------------------------------------------------------------------  --------------  [address]  What is the best way for the office to contact you? OK to leave message on   voicemail  Preferred Call Back Phone Number? [phone]  ---------------------------------------------------------------------------  --------------  SCRIPT ANSWERS  Relationship to Patient?  Self

## 2022-04-27 ENCOUNTER — OFFICE VISIT (OUTPATIENT)
Dept: CARDIOLOGY CLINIC | Age: 52
End: 2022-04-27
Payer: COMMERCIAL

## 2022-04-27 VITALS
HEIGHT: 73 IN | WEIGHT: 210.6 LBS | DIASTOLIC BLOOD PRESSURE: 84 MMHG | HEART RATE: 73 BPM | BODY MASS INDEX: 27.91 KG/M2 | SYSTOLIC BLOOD PRESSURE: 158 MMHG

## 2022-04-27 DIAGNOSIS — I48.91 ATRIAL FIBRILLATION, UNSPECIFIED TYPE (HCC): Primary | ICD-10-CM

## 2022-04-27 PROCEDURE — 99204 OFFICE O/P NEW MOD 45 MIN: CPT | Performed by: INTERNAL MEDICINE

## 2022-04-27 PROCEDURE — 93000 ELECTROCARDIOGRAM COMPLETE: CPT | Performed by: INTERNAL MEDICINE

## 2022-04-27 RX ORDER — DIFLUPREDNATE 0.5 MG/ML
1 EMULSION OPHTHALMIC
COMMUNITY

## 2022-04-27 NOTE — PROGRESS NOTES
[address] ([place]) [address] [facility]  Dept: [phone]  Cardiac Electrophysiology: Consultation Note  Patient's demographics:  Date:   4/27/2022  Patient name:              Fani Pichardo  YOB: 1970  Sex:    male   MRN:   [id number]    Primary Care Physician:  Winnie Rouse MD    Referring Physician:  Kerri Sanders MD    Reason for Consultation:  New atrial fibrillation. Clinical Summary:  51/M during a routine visit with Dr. Karsten Lemos on 4/20/2022 was noted to have irregular pulse. ECG confirmed atrial fibrillation with controlled ventricular rate (96 bpm). The patient did not report any symptom at that time. He was started on apixaban and referred here for further management. Patient has no complaints. Denies chest pain, palpitations, shortness of breath, lower extremity swelling or syncope. No known prior history of atrial arrhythmias. The patient has hypertension per medical records but he denies it. Reports that he is taking ACE inhibitor's for diabetic nephropathy. Medical Hx: HTN (per EMR patients denies), HPL, DM1 complicated by ne[phropathy, neuropathy and retinopathy, retinal detachment blindness right eye, and sleep apnea on CPAP. Review of systems:  Constitutional: Negative for chills and fever  HENT: Negative for congestion, sinus pressure, sneezing and sore throat. Eyes Blind right eye. Respiratory: Negative for apnea, cough  Gastrointestinal: Negative for blood in stool, constipation, diarrhea   Endocrine: Negative for cold intolerance, heat intolerance, polydipsia. Genitourinary: Negative for dysuria, enuresis, flank pain and hematuria. Musculoskeletal: Negative for arthralgias, joint swelling and neck pain. Neurological: Negative for numbness and headaches. Psychiatric/Behavioral: Negative for agitation, confusion, decreased concentration and dysphoric mood.       Past Medical History[de-identified]  Past Medical History:   Diagnosis Date    Anxiety     Detached retina 08/2018    right    Diabetic retinopathy ([address]     bilat, has had laser and injections    Ganglion cyst     Heart burn     Hyperlipidemia     Hypertension     Insulin pump in place     Neuropathy     Pneumonia     at age 26   Armida Stabs Retinal detachment     RIGHT    Sleep apnea     has Cpap at home but does not wear like suppose to    Type I (juvenile type) diabetes mellitus without mention of complication, not stated as uncontrolled     12/1982    Wears glasses     usually uses contact lenses       Past Surgical History:  Past Surgical History:   Procedure Laterality Date    CYST REMOVAL  1992    rt foot, ganglion    EYE SURGERY      has had laser to both eyes in office    LAPAROSCOPIC APPENDECTOMY N/A 12/3/2021    LAP APPENDECTOMY performed by Sridevi Fuentes MD at [address] [facility] OFFICE/OUTPT VISIT,PROCEDURE ONLY Right 8/15/2018    VITRECTOMY 23 GAUGE, MEMBRANE PEELING, AIR FLUID EXCHANGE, AIR GAS EXCHANGE WITH 16% C3F8, ENDOLASER, 200 MSECONDS, 200 MWATTS, 809 PULSES performed by Mercedez Cordova MD at [address] [facility] OFFICE/OUTPT VISIT,PROCEDURE ONLY Right 9/19/2018    VITRECTOMY 23 GAUGE WITH SCLERAL BUCKLE, SILICONE OIL INSERTION, ENDOLASER, 200 MWATTS, 200 MSECONDS, 312 PULSES.  performed by Mercedez Cordova MD at [address] [facility]  2016    has never had lasik eye surgery    VITRECTOMY Right 08/15/2018    membrane peeling, air gas exchange    VITRECTOMY Right 08/15/2018    Dr Lincoln Nick    VITRECTOMY Right 73/25/7071    laser silicone oil injection    WISDOM TOOTH EXTRACTION  2005       Family History:  Family History   Problem Relation Age of Onset    Heart Disease Father     Asthma Father     High Blood Pressure Father         Social History:  Social History     Socioeconomic History    Marital status:      Spouse name: None    Number of children: None    Years of education: None    Highest education level: None   Occupational History    None   Tobacco Use    Smoking status: Former Smoker     Packs/day: 0.25     Years: 15.00     Pack years: 3.75     Quit date: 2002     Years since quittin.2    Smokeless tobacco: Current User     Types: Chew   Vaping Use    Vaping Use: Never used   Substance and Sexual Activity    Alcohol use: Yes     Alcohol/week: 3.0 standard drinks     Types: 3 Cans of beer per week     Comment: daily    Drug use: No    Sexual activity: None   Other Topics Concern    None   Social History Narrative    None     Social Determinants of Health     Financial Resource Strain: Low Risk     Difficulty of Paying Living Expenses: Not hard at all   Food Insecurity: No Food Insecurity    Worried About Running Out of Food in the Last Year: Never true    Halle of Food in the Last Year: Never true   Transportation Needs:     Lack of Transportation (Medical): Not on file    Lack of Transportation (Non-Medical): Not on file   Physical Activity:     Days of Exercise per Week: Not on file    Minutes of Exercise per Session: Not on file   Stress:     Feeling of Stress : Not on file   Social Connections:     Frequency of Communication with Friends and Family: Not on file    Frequency of Social Gatherings with Friends and Family: Not on file    Attends Cheondoism Services: Not on file    Active Member of [address] [place] [address] or Organizations: Not on file    Attends Club or Organization Meetings: Not on file    Marital Status: Not on file   Intimate Partner Violence:     Fear of Current or Ex-Partner: Not on file    Emotionally Abused: Not on file    Physically Abused: Not on file    Sexually Abused: Not on file   Housing Stability:     Unable to Pay for Housing in the Last Year: Not on file    Number of [place] in the Last Year: Not on file    Unstable Housing in the Last Year: Not on file        Allergies:   Allergies   Allergen Reactions    Seasonal Medications:  Current Outpatient Medications   Medication Sig Dispense Refill    difluprednate (DUREZOL) 0.05 % EMUL 1 drop      apixaban (ELIQUIS) 5 MG TABS tablet Take 5 mg by mouth 2 times daily      pantoprazole (PROTONIX) 40 MG tablet TAKE 1 TABLET BY MOUTH EVERY DAY 90 tablet 2    insulin aspart (NOVOLOG) 100 UNIT/ML injection vial INJECT AS DIRECTED PER PHYSICIAN MAX 80 UNITS PER DAY 80 mL 1    escitalopram (LEXAPRO) 20 MG tablet Take 1 tablet by mouth every day 90 tablet 2    prednisoLONE acetate (PRED FORTE) 1 % ophthalmic suspension Place 1 drop into both eyes 2 times daily      atorvastatin (LIPITOR) 10 MG tablet TAKE 1 TABLET BY MOUTH EVERY DAY 90 tablet 3    quinapril (ACCUPRIL) 10 MG tablet [id number] 90 tablet 3    latanoprost (XALATAN) 0.005 % ophthalmic solution Apply 1 drop to eye nightly      dorzolamide-timolol (COSOPT) 22.3-6.8 MG/ML ophthalmic solution Place 1 drop into the right eye 2 times daily      glucagon 1 MG injection Inject 1 mg into the skin See Admin Instructions Follow package directions for low blood sugar. 1 kit 3    aspirin 81 MG tablet Take 81 mg by mouth nightly       blood glucose test strips (ONE TOUCH ULTRA TEST) strip Use to test blood glucose levels up to 6 times per day. Diagnosis: E10.9 550 strip 1    Insulin Syringe-Needle U-100 31G X 5/16\" 0.5 ML MISC 1 each by Does not apply route 4 times daily 100 each 2     No current facility-administered medications for this visit. Physical Examination:  Vitals:    04/27/22 1222   BP: (!) 158/84   Pulse: 73     Body mass index is 27.79 kg/m². GENERAL: Alert and oriented. No distress. EYES: Right eye is blind. Reasonable vision left eye. ENT: No cyanosis. No thyromegaly or cervical LAP. VESSELS: No jugular venous distension or carotid bruits. HEART: Normal S1/S2. No murmur, rub or gallop. LUNGS: Clear to auscultation. ABDOMEN: Soft and non-tender. EXTREMITIES: No lower extremity edema.  Feet are warm.   NEUROLOGICAL: Grossly normal.     Laboratory And Diagnostic Data  I have personally reviewed and interpreted the results of the following diagnostic testing    Lab Results   Component Value Date    WBC 10.9 (H) 12/04/2021    HGB 13.2 (L) 12/04/2021    HCT 40.2 (L) 12/04/2021     12/04/2021    CHOL 177 02/17/2022    TRIG 58 02/17/2022    HDL 62 02/17/2022    ALT 25 02/17/2022    AST 19 02/17/2022     02/17/2022    K 4.7 02/17/2022     02/17/2022    CREATININE 0.8 02/17/2022    BUN 16 02/17/2022    CO2 27 02/17/2022    TSH 0.977 07/11/2018    LABA1C 6.7 (H) 02/17/2022    LABMICR 2.43 11/04/2019      Echocardiogram NA.   ECG 4/20/2022: Atrial fibrillation controlled ventricular rate (94 bpm). Normal QRS duration. ECG 4/27/2022: Sinus rhythm.  Stress test NA. Impression:  · Incidentally detected atrial fibrillation, spontaneous conversion to sinus rhythm. [id number] score 2 (HTN and DM2). The patient denies having high blood patient reports that he takes quinapril for diabetic nephropathy. · Complicated type 1 diabetes mellitus. · Sleep apnea on CPAP    Assessment And Recommendations:  Patient is currently in sinus rhythm. Denies palpitations. No prior history of atrial arrhythmias. We will request an echocardiogram to evaluate his valves and ventricular function. We will also request a 30 event monitor. Based on the atrial fibrillation burden we will decide about long-term anticoagulation. We will hold on antiarrhythmic therapy or beta-blockers at this time. Discussed plan with the patient's. Questions answered. Verbalized understanding of the discussion. Thank you Dr. Angella Pérez for allowing me to participate in the care of your patient. Please call me if you have any questions. **This report has been created using voice recognition software. It may contain minor errors which are inherent in voice recognition technology. **       Electronically signed by La Nena Morrow MD Alma Rosa, MRCP, Kel Hoyos on 4/27/2022 at 1:16 PM

## 2022-05-05 ENCOUNTER — TELEPHONE (OUTPATIENT)
Dept: CARDIOLOGY CLINIC | Age: 52
End: 2022-05-05

## 2022-05-05 NOTE — TELEPHONE ENCOUNTER
Pt called the office to see why he cannot get his eliquis that dr Julienne Viramontes prescribed. I called the pharmacy and the phamacist said his insurance will pay for xarelto or coumadin? ?      Will you switch him to xarelto ? ? Spoke to pt and told him we are waiting to hearing from dr Julienne Viramontes. Will see if we have samples for the eliquis. If you do not switch him, told pt we need to do a prior auth and those can take awhile   There are samples in cabinet if needed     SPOKE TO DR ORDONEZ. VERBAL ORDER TO CHANGE TO XARELTO 20 MG DAILY  PLEASE AGREE DR ORDONEZ.  RX PENDED FOR SIG

## 2022-05-05 NOTE — TELEPHONE ENCOUNTER
rx for xarelto signed per Alma Rosa, pt knows to switch from eliquis to xarelto.   No samples were given out, rx sent to pharmacy

## 2022-05-17 ENCOUNTER — HOSPITAL ENCOUNTER (OUTPATIENT)
Dept: NON INVASIVE DIAGNOSTICS | Age: 52
Discharge: HOME | End: 2022-05-17
Payer: COMMERCIAL

## 2022-05-17 DIAGNOSIS — I48.91 ATRIAL FIBRILLATION, UNSPECIFIED TYPE (HCC): ICD-10-CM

## 2022-05-17 LAB
LV EF: 58 %
LVEF MODALITY: NORMAL

## 2022-05-17 PROCEDURE — 93270 REMOTE 30 DAY ECG REV/REPORT: CPT

## 2022-05-17 PROCEDURE — 93306 TTE W/DOPPLER COMPLETE: CPT

## 2022-06-18 NOTE — PROCEDURES
[address] [id number]                                 EVENT MONITOR    PATIENT NAME: FIDEL AGARWAL                      :        1970  MED REC NO:   [id number]                           ROOM:  ACCOUNT NO:   [de-identified]                           ADMIT DATE: 2022  PROVIDER:     Sunshine Salinas M.D.      TEST TYPE:  EVENT MONITOR    CLINICAL HISTORY AND INDICATIONS:  This is a patient with atrial  fibrillation. EVENT MONITOR DESCRIPTION:  Event monitor was attached to the patient  between 2022 and 06/15/2022. EVENT MONITOR FINDINGS:  Baseline rhythm showed sinus rhythm. Pretty  much unremarkable event monitor with no significant arrhythmias. CONCLUSION:  Unremarkable event monitor with no significant arrhythmias.         Dayron Tang M.D.    D: 2022 11:02:02       T: 2022 11:34:17     [email or web address]  Job#: [id number]     Doc#: [id number]    CC:

## 2022-06-22 RX ORDER — ESCITALOPRAM OXALATE 20 MG/1
TABLET ORAL
Qty: 90 TABLET | Refills: 2 | Status: SHIPPED | OUTPATIENT
Start: 2022-06-22

## 2022-06-22 NOTE — TELEPHONE ENCOUNTER
Please approve or deny     Last Visit Date:  2/15/2022       Next Visit Date:    Visit date not found

## 2022-06-22 NOTE — TELEPHONE ENCOUNTER
PT LM on nurse line stating he was mistakenly taking his Xarelto BID, instead of daily. It looks like he was originally prescribed Eliquis 5 mg BID but it was changed d/t insurance not covering. Assuming that is where the confusion came in. Pt will be out of pills and needs assistance until he can get filled at pharmacy. Called [facility] on [address], [phone], and they state the soonest he can fill will be 7/12 (and even that is not a guaranteed date). Samples prepared and ready for . Spoke to patient, instructed on correct dosing. Pt denies bleeding, blood in stool, urine. Pt will come to office this afternoon to  samples.

## 2022-06-29 ENCOUNTER — OFFICE VISIT (OUTPATIENT)
Dept: CARDIOLOGY CLINIC | Age: 52
End: 2022-06-29
Payer: COMMERCIAL

## 2022-06-29 VITALS
BODY MASS INDEX: 26.98 KG/M2 | WEIGHT: 203.6 LBS | HEIGHT: 73 IN | HEART RATE: 78 BPM | DIASTOLIC BLOOD PRESSURE: 93 MMHG | SYSTOLIC BLOOD PRESSURE: 143 MMHG

## 2022-06-29 DIAGNOSIS — I48.91 ATRIAL FIBRILLATION, UNSPECIFIED TYPE (HCC): Primary | ICD-10-CM

## 2022-06-29 PROCEDURE — 93000 ELECTROCARDIOGRAM COMPLETE: CPT | Performed by: INTERNAL MEDICINE

## 2022-06-29 PROCEDURE — 99214 OFFICE O/P EST MOD 30 MIN: CPT | Performed by: INTERNAL MEDICINE

## 2022-06-29 NOTE — PROGRESS NOTES
[address] ([place])  [address] [address]  Dept: [phone]  Cardiac Electrophysiology: Follow up Note  Patient's demographics:  Date:   6/29/2022  Patient name:              Evelin Dee  YOB: 1970  Sex:    male   MRN:   [id number]    Primary Care Physician:  Rob Piper MD    Referring Physician:  Katharine Carmona MD    Reason for Follow up:  Atrial fibrillation. Clinical Summary:  51/M was recently diagnosed to have atrial fibrillation on 4/20/2022. He spontaneously converted to sinus rhythm. He is here to discuss the results of his echocardiogram and event monitor. Will happen event monitor was unremarkable. Echocardiogram showed preserved left ventricle size and function, ejection fraction 55 to 60%. No complaints. Denies chest pain, palpitations syncope lower extremity swelling. Medical Hx: HTN (per EMR patients denies), HPL, DM1 complicated by ne[phropathy, neuropathy and retinopathy, retinal detachment blindness right eye, and sleep apnea on CPAP. Review of systems:  Constitutional: Negative for chills and fever  HENT: Negative for congestion, sinus pressure, sneezing and sore throat. Eyes Blind right eye. Respiratory: Negative for apnea, cough  Gastrointestinal: Negative for blood in stool, constipation, diarrhea   Endocrine: Negative for cold intolerance, heat intolerance, polydipsia. Genitourinary: Negative for dysuria, enuresis, flank pain and hematuria. Musculoskeletal: Negative for arthralgias, joint swelling and neck pain. Neurological: Negative for numbness and headaches. Psychiatric/Behavioral: Negative for agitation, confusion, decreased concentration and dysphoric mood.       Past Medical History[de-identified]  Past Medical History:   Diagnosis Date    Anxiety     Detached retina 08/2018    right    Diabetic retinopathy ([address]     bilat, has had laser and injections    Ganglion cyst     Heart burn  Hyperlipidemia     Hypertension     Insulin pump in place     Neuropathy     Pneumonia     at age 26   Connye Sole Retinal detachment     RIGHT    Sleep apnea     has Cpap at home but does not wear like suppose to    Type I (juvenile type) diabetes mellitus without mention of complication, not stated as uncontrolled     12/1982    Wears glasses     usually uses contact lenses       Past Surgical History:  Past Surgical History:   Procedure Laterality Date    CYST REMOVAL  1992    rt foot, ganglion    EYE SURGERY      has had laser to both eyes in office    LAPAROSCOPIC APPENDECTOMY N/A 12/3/2021    LAP APPENDECTOMY performed by Iesha Richardson MD at [address] OFFICE/OUTPT VISIT,PROCEDURE ONLY Right 8/15/2018    VITRECTOMY 23 GAUGE, MEMBRANE PEELING, AIR FLUID EXCHANGE, AIR GAS EXCHANGE WITH 16% C3F8, ENDOLASER, 200 MSECONDS, 200 MWATTS, 809 PULSES performed by Bisi Ospina MD at [address] OFFICE/OUTPT VISIT,PROCEDURE ONLY Right 9/19/2018    VITRECTOMY 23 GAUGE WITH SCLERAL BUCKLE, SILICONE OIL INSERTION, ENDOLASER, 200 MWATTS, 200 MSECONDS, 312 PULSES.  performed by Bisi Ospina MD at [address] [place] [address]  2016    has never had lasik eye surgery    VITRECTOMY Right 08/15/2018    membrane peeling, air gas exchange    VITRECTOMY Right 08/15/2018    Dr Vani Hairston    VITRECTOMY Right 25/03/3156    laser silicone oil injection    WISDOM TOOTH EXTRACTION  2005       Family History:  Family History   Problem Relation Age of Onset    Heart Disease Father     Asthma Father     High Blood Pressure Father         Social History:  Social History     Socioeconomic History    Marital status:      Spouse name: Not on file    Number of children: Not on file    Years of education: Not on file    Highest education level: Not on file   Occupational History    Not on file   Tobacco Use    Smoking status: Former Smoker     Packs/day: 0.25     Years: 15.00     Pack years: 3.75     Quit date: 2002     Years since quittin.4    Smokeless tobacco: Current User     Types: Chew   Vaping Use    Vaping Use: Never used   Substance and Sexual Activity    Alcohol use: Yes     Alcohol/week: 3.0 standard drinks     Types: 3 Cans of beer per week     Comment: daily    Drug use: No    Sexual activity: Not on file   Other Topics Concern    Not on file   Social History Narrative    Not on file     Social Determinants of Health     Financial Resource Strain:     Difficulty of Paying Living Expenses: Not on file   Food Insecurity:     Worried About Running Out of Food in the Last Year: Not on file    Halle of Food in the Last Year: Not on file   Transportation Needs:     Lack of Transportation (Medical): Not on file    Lack of Transportation (Non-Medical): Not on file   Physical Activity:     Days of Exercise per Week: Not on file    Minutes of Exercise per Session: Not on file   Stress:     Feeling of Stress : Not on file   Social Connections:     Frequency of Communication with Friends and Family: Not on file    Frequency of Social Gatherings with Friends and Family: Not on file    Attends Oriental orthodox Services: Not on file    Active Member of [address] [place] [address] or Organizations: Not on file    Attends Club or Organization Meetings: Not on file    Marital Status: Not on file   Intimate Partner Violence:     Fear of Current or Ex-Partner: Not on file    Emotionally Abused: Not on file    Physically Abused: Not on file    Sexually Abused: Not on file   Housing Stability:     Unable to Pay for Housing in the Last Year: Not on file    Number of [place] in the Last Year: Not on file    Unstable Housing in the Last Year: Not on file        Allergies:   Allergies   Allergen Reactions    Seasonal         Medications:  Current Outpatient Medications   Medication Sig Dispense Refill    escitalopram (LEXAPRO) 20 MG tablet TAKE 1 TABLET BY MOUTH EVERY DAY 90 tablet 2    rivaroxaban (XARELTO) 20 MG TABS tablet Take 1 tablet by mouth daily (with breakfast) 28 tablet 0    rivaroxaban (XARELTO) 20 MG TABS tablet Take 1 tablet by mouth daily (with breakfast) 90 tablet 1    difluprednate (DUREZOL) 0.05 % EMUL 1 drop      pantoprazole (PROTONIX) 40 MG tablet TAKE 1 TABLET BY MOUTH EVERY DAY 90 tablet 2    insulin aspart (NOVOLOG) 100 UNIT/ML injection vial INJECT AS DIRECTED PER PHYSICIAN MAX 80 UNITS PER DAY 80 mL 1    prednisoLONE acetate (PRED FORTE) 1 % ophthalmic suspension Place 1 drop into both eyes 2 times daily      atorvastatin (LIPITOR) 10 MG tablet TAKE 1 TABLET BY MOUTH EVERY DAY 90 tablet 3    quinapril (ACCUPRIL) 10 MG tablet [id number] 90 tablet 3    latanoprost (XALATAN) 0.005 % ophthalmic solution Apply 1 drop to eye nightly      dorzolamide-timolol (COSOPT) 22.3-6.8 MG/ML ophthalmic solution Place 1 drop into the right eye 2 times daily      blood glucose test strips (ONE TOUCH ULTRA TEST) strip Use to test blood glucose levels up to 6 times per day. Diagnosis: E10.9 550 strip 1    glucagon 1 MG injection Inject 1 mg into the skin See Admin Instructions Follow package directions for low blood sugar. 1 kit 3    aspirin 81 MG tablet Take 81 mg by mouth nightly       Insulin Syringe-Needle U-100 31G X 5/16\" 0.5 ML MISC 1 each by Does not apply route 4 times daily 100 each 2     No current facility-administered medications for this visit. Physical Examination:  Vitals:    06/29/22 1036   BP: (!) 143/93   Pulse: 78     Body mass index is 26.86 kg/m². GENERAL: Alert and oriented. No distress. EYES: Right eye is blind. Reasonable vision left eye. ENT: No cyanosis. No thyromegaly or cervical LAP. VESSELS: No jugular venous distension or carotid bruits. HEART: Normal S1/S2. No murmur, rub or gallop. LUNGS: Clear to auscultation. ABDOMEN: Soft and non-tender. EXTREMITIES: No lower extremity edema. Feet are warm.    NEUROLOGICAL: Grossly normal.     Laboratory And Diagnostic Data  I have personally reviewed and interpreted the results of the following diagnostic testing    Lab Results   Component Value Date    WBC 10.9 (H) 12/04/2021    HGB 13.2 (L) 12/04/2021    HCT 40.2 (L) 12/04/2021     12/04/2021    CHOL 177 02/17/2022    TRIG 58 02/17/2022    HDL 62 02/17/2022    ALT 25 02/17/2022    AST 19 02/17/2022     02/17/2022    K 4.7 02/17/2022     02/17/2022    CREATININE 0.8 02/17/2022    BUN 16 02/17/2022    CO2 27 02/17/2022    TSH 0.977 07/11/2018    LABA1C 6.7 (H) 02/17/2022    LABMICR 2.43 11/04/2019      Echocardiogram 5/17/2022: LVEF 55 to 60%, WT 0.9 cm and TULIO 22. No significant valvular abnormalities.  ECG 4/20/2022: Atrial fibrillation controlled ventricular rate (94 bpm). Normal QRS duration. ECG Sinus rhythm.  Stress test NA.  Event monitor 5/17/2022: No arrhythmias. Impression:  · PAF, (single episode), spontaneous conversion to sinus rhythm. [id number] score 1 (DM2). Takes quinapril for diabetic nephropathy. · Complicated type 1 diabetes mellitus. · Sleep apnea on CPAP    Assessment And Recommendations: The patient is asymptomatic. No documented arrhythmias on event monitor. He has preserved left ventricle size and function. In view of single episode of atrial fibrillation will hold on antiarrhythmic therapy and beta-blockers at this time. Due to low WAI2LX5-DDGb score we will discontinue his Xarelto. Follow-up in a year with Holter monitor. Thank you for allowing me to participate in the care of your patient. Please call me if you have any questions. Thank you Dr. Raffaele Fernandez for allowing me to participate in the care of your patient. Please call me if you have any questions. **This report has been created using voice recognition software. It may contain minor errors which are inherent in voice recognition technology. **       Wood Cabrera MD, MRCP, [facility], [facility] on 6/29/2022 at 10:44 AM

## 2022-07-11 RX ORDER — ATORVASTATIN CALCIUM 10 MG/1
TABLET, FILM COATED ORAL
Qty: 90 TABLET | Refills: 3 | OUTPATIENT
Start: 2022-07-11

## 2022-07-11 RX ORDER — QUINAPRIL 10 MG/1
TABLET ORAL
Qty: 90 TABLET | Refills: 3 | OUTPATIENT
Start: 2022-07-11

## 2022-07-13 RX ORDER — ATORVASTATIN CALCIUM 10 MG/1
TABLET, FILM COATED ORAL
Qty: 90 TABLET | Refills: 3 | Status: SHIPPED | OUTPATIENT
Start: 2022-07-13

## 2022-07-13 RX ORDER — ATORVASTATIN CALCIUM 10 MG/1
TABLET, FILM COATED ORAL
Qty: 90 TABLET | Refills: 3 | OUTPATIENT
Start: 2022-07-13

## 2022-07-13 RX ORDER — QUINAPRIL 10 MG/1
TABLET ORAL
Qty: 90 TABLET | Refills: 3 | OUTPATIENT
Start: 2022-07-13

## 2022-07-13 RX ORDER — QUINAPRIL 10 MG/1
TABLET ORAL
Qty: 90 TABLET | Refills: 3 | Status: SHIPPED | OUTPATIENT
Start: 2022-07-13 | End: 2022-10-20 | Stop reason: SDUPTHER

## 2022-09-20 DIAGNOSIS — E10.65 TYPE 1 DIABETES MELLITUS WITH HYPERGLYCEMIA (HCC): ICD-10-CM

## 2022-09-21 ENCOUNTER — OFFICE VISIT (OUTPATIENT)
Dept: FAMILY MEDICINE CLINIC | Age: 52
End: 2022-09-21
Payer: COMMERCIAL

## 2022-09-21 VITALS
SYSTOLIC BLOOD PRESSURE: 126 MMHG | BODY MASS INDEX: 26.65 KG/M2 | WEIGHT: 202 LBS | HEART RATE: 81 BPM | DIASTOLIC BLOOD PRESSURE: 80 MMHG | RESPIRATION RATE: 16 BRPM | OXYGEN SATURATION: 97 %

## 2022-09-21 DIAGNOSIS — E10.8 CONTROLLED DIABETES MELLITUS TYPE 1 WITH COMPLICATIONS (HCC): Primary | ICD-10-CM

## 2022-09-21 DIAGNOSIS — E78.2 MIXED HYPERLIPIDEMIA: ICD-10-CM

## 2022-09-21 DIAGNOSIS — Z23 FLU VACCINE NEED: ICD-10-CM

## 2022-09-21 DIAGNOSIS — Z12.5 SCREENING PSA (PROSTATE SPECIFIC ANTIGEN): ICD-10-CM

## 2022-09-21 LAB — HBA1C MFR BLD: 6.3 %

## 2022-09-21 PROCEDURE — 90674 CCIIV4 VAC NO PRSV 0.5 ML IM: CPT | Performed by: FAMILY MEDICINE

## 2022-09-21 PROCEDURE — 3044F HG A1C LEVEL LT 7.0%: CPT | Performed by: FAMILY MEDICINE

## 2022-09-21 PROCEDURE — 99214 OFFICE O/P EST MOD 30 MIN: CPT | Performed by: FAMILY MEDICINE

## 2022-09-21 PROCEDURE — 83036 HEMOGLOBIN GLYCOSYLATED A1C: CPT | Performed by: FAMILY MEDICINE

## 2022-09-21 PROCEDURE — 90471 IMMUNIZATION ADMIN: CPT | Performed by: FAMILY MEDICINE

## 2022-09-21 RX ORDER — PANTOPRAZOLE SODIUM 40 MG/1
TABLET, DELAYED RELEASE ORAL
Qty: 90 TABLET | Refills: 2 | Status: SHIPPED | OUTPATIENT
Start: 2022-09-21

## 2022-09-21 SDOH — ECONOMIC STABILITY: FOOD INSECURITY: WITHIN THE PAST 12 MONTHS, THE FOOD YOU BOUGHT JUST DIDN'T LAST AND YOU DIDN'T HAVE MONEY TO GET MORE.: NEVER TRUE

## 2022-09-21 SDOH — ECONOMIC STABILITY: FOOD INSECURITY: WITHIN THE PAST 12 MONTHS, YOU WORRIED THAT YOUR FOOD WOULD RUN OUT BEFORE YOU GOT MONEY TO BUY MORE.: NEVER TRUE

## 2022-09-21 ASSESSMENT — PATIENT HEALTH QUESTIONNAIRE - PHQ9
2. FEELING DOWN, DEPRESSED OR HOPELESS: 0
SUM OF ALL RESPONSES TO PHQ QUESTIONS 1-9: 0
SUM OF ALL RESPONSES TO PHQ9 QUESTIONS 1 & 2: 0
SUM OF ALL RESPONSES TO PHQ QUESTIONS 1-9: 0
1. LITTLE INTEREST OR PLEASURE IN DOING THINGS: 0

## 2022-09-21 ASSESSMENT — SOCIAL DETERMINANTS OF HEALTH (SDOH): HOW HARD IS IT FOR YOU TO PAY FOR THE VERY BASICS LIKE FOOD, HOUSING, MEDICAL CARE, AND HEATING?: NOT HARD AT ALL

## 2022-09-21 NOTE — PROGRESS NOTES
Immunization(s) given during visit:    Immunizations Administered       Name Date Dose Route    Influenza, FLUCELVAX, (age 10 mo+), MDCK, PF, 0.5mL 9/21/2022 0.5 mL Intramuscular    Site: Deltoid- Right    Lot: [id number]    NDC: 63679-327-53            Most recent Vaccine Information Sheet  given to pt

## 2022-09-25 ASSESSMENT — ENCOUNTER SYMPTOMS
ABDOMINAL PAIN: 0
SORE THROAT: 0
EYE PAIN: 0
SINUS PRESSURE: 0
COUGH: 0
NAUSEA: 0
RHINORRHEA: 0
SHORTNESS OF BREATH: 0
DIARRHEA: 0
ABDOMINAL DISTENTION: 0
CONSTIPATION: 0

## 2022-09-25 NOTE — PROGRESS NOTES
[address] [place] [address]  Dept: [phone]  Dept Fax: [phone]  Loc: [phone]  PROGRESS NOTE      VisitDate: 9/21/2022    Marleta Cushing is a 49 y.o. male who presents today for:     Chief Complaint   Patient presents with    Discuss Labs    Flu Vaccine         Subjective:  HPI  Type I diabetic comes in for follow-up sugars been stable well-controlled A1c is normal.  History of hyperlipidemia reviewed labs with patient. Review of Systems   Constitutional:  Negative for appetite change and fever. HENT:  Negative for congestion, ear pain, postnasal drip, rhinorrhea, sinus pressure and sore throat. Eyes:  Negative for pain and visual disturbance. Respiratory:  Negative for cough and shortness of breath. Cardiovascular:  Negative for chest pain. Gastrointestinal:  Negative for abdominal distention, abdominal pain, constipation, diarrhea and nausea. Genitourinary:  Negative for dysuria, frequency and urgency. Musculoskeletal:  Negative for arthralgias. Skin:  Negative for rash. Neurological:  Negative for dizziness.    Past Medical History:   Diagnosis Date    Anxiety     Detached retina 08/2018    right    Diabetic retinopathy ([address]     bilat, has had laser and injections    Ganglion cyst     Heart burn     Hyperlipidemia     Hypertension     Insulin pump in place     Neuropathy     Pneumonia     at age 26    Retinal detachment     RIGHT    Sleep apnea     has Cpap at home but does not wear like suppose to    Type I (juvenile type) diabetes mellitus without mention of complication, not stated as uncontrolled     12/1982    Wears glasses     usually uses contact lenses      Past Surgical History:   Procedure Laterality Date    CYST REMOVAL  1992    rt foot, ganglion    EYE SURGERY      has had laser to both eyes in office    LAPAROSCOPIC APPENDECTOMY N/A 12/3/2021    LAP APPENDECTOMY performed by Kyle Vallejo MD Deric at [facility] OFFICE/OUTPT VISIT,PROCEDURE ONLY Right 8/15/2018    VITRECTOMY 23 GAUGE, MEMBRANE PEELING, AIR FLUID EXCHANGE, AIR GAS EXCHANGE WITH 16% C3F8, ENDOLASER, 200 MSECONDS, 200 MWATTS, 809 PULSES performed by Kristan Serrato MD at [facility] OFFICE/OUTPT VISIT,PROCEDURE ONLY Right 2018    VITRECTOMY 23 GAUGE WITH SCLERAL BUCKLE, SILICONE OIL INSERTION, ENDOLASER, 200 MWATTS, 200 MSECONDS, 312 PULSES. performed by Kristan Serrato MD at [address] [place] [address]  2016    has never had lasik eye surgery    VITRECTOMY Right 08/15/2018    membrane peeling, air gas exchange    VITRECTOMY Right 08/15/2018    Dr Anabel Bal    VITRECTOMY Right     laser silicone oil injection    WISDOM TOOTH EXTRACTION       Family History   Problem Relation Age of Onset    Heart Disease Father     Asthma Father     High Blood Pressure Father      Social History     Tobacco Use    Smoking status: Former     Packs/day: 0.25     Years: 15.00     Pack years: 3.75     Types: Cigarettes     Quit date: 2002     Years since quittin.6    Smokeless tobacco: Current     Types: Chew   Substance Use Topics    Alcohol use:  Yes     Alcohol/week: 3.0 standard drinks     Types: 3 Cans of beer per week     Comment: daily      Current Outpatient Medications   Medication Sig Dispense Refill    pantoprazole (PROTONIX) 40 MG tablet TAKE 1 TABLET BY MOUTH EVERY DAY 90 tablet 2    atorvastatin (LIPITOR) 10 MG tablet TAKE 1 TABLET BY MOUTH EVERY DAY 90 tablet 3    escitalopram (LEXAPRO) 20 MG tablet TAKE 1 TABLET BY MOUTH EVERY DAY 90 tablet 2    difluprednate (DUREZOL) 0.05 % EMUL 1 drop      insulin aspart (NOVOLOG) 100 UNIT/ML injection vial INJECT AS DIRECTED PER PHYSICIAN MAX 80 UNITS PER DAY 80 mL 1    prednisoLONE acetate (PRED FORTE) 1 % ophthalmic suspension Place 1 drop into both eyes 2 times daily      latanoprost (XALATAN) 0.005 % ophthalmic solution Apply 1 drop to eye nightly dorzolamide-timolol (COSOPT) 22.3-6.8 MG/ML ophthalmic solution Place 1 drop into the left eye 2 times daily       blood glucose test strips (ONE TOUCH ULTRA TEST) strip Use to test blood glucose levels up to 6 times per day. Diagnosis: E10.9 550 strip 1    glucagon 1 MG injection Inject 1 mg into the skin See Admin Instructions Follow package directions for low blood sugar. 1 kit 3    aspirin 81 MG tablet Take 81 mg by mouth nightly       Insulin Syringe-Needle U-100 31G X 5/16\" 0.5 ML MISC 1 each by Does not apply route 4 times daily 100 each 2    quinapril (ACCUPRIL) 10 MG tablet TAKE 1 TABLET BY MOUTH EVERY DAY 90 tablet 3     No current facility-administered medications for this visit. Allergies   Allergen Reactions    Seasonal      Health Maintenance   Topic Date Due    Hepatitis B vaccine (1 of 3 - 3-dose series) Never done    HIV screen  Never done    Hepatitis C screen  Never done    DTaP/Tdap/Td vaccine (1 - Tdap) Never done    Diabetic microalbuminuria test  11/04/2020    Pneumococcal 0-64 years Vaccine (2 - PPSV23 or PCV20) 12/10/2020    Shingles vaccine (1 of 2) Never done    COVID-19 Vaccine (4 - Booster for Pfizer series) 04/29/2022    Diabetic foot exam  02/15/2023    Lipids  02/17/2023    Diabetic retinal exam  08/30/2023    A1C test (Diabetic or Prediabetic)  09/21/2023    Depression Screen  09/21/2023    Colorectal Cancer Screen  04/23/2025    Flu vaccine  Completed    Hepatitis A vaccine  Aged Out    Hib vaccine  Aged Out    Meningococcal (ACWY) vaccine  Aged Out         Objective:     Physical Exam  Constitutional:       General: He is not in acute distress. Appearance: He is well-developed. He is not diaphoretic. HENT:      Head: Normocephalic and atraumatic. Right Ear: External ear normal.      Left Ear: External ear normal.   Eyes:      Conjunctiva/sclera: Conjunctivae normal.   Neck:      Vascular: No JVD. Cardiovascular:      Rate and Rhythm: Normal rate and regular rhythm. recognition technology. **       Electronically signed by Darien Nelson MD on 9/25/2022 at 11:57 AM

## 2022-09-27 RX ORDER — INSULIN ASPART 100 [IU]/ML
INJECTION, SOLUTION INTRAVENOUS; SUBCUTANEOUS
Qty: 80 ML | Refills: 1 | Status: SHIPPED | OUTPATIENT
Start: 2022-09-27 | End: 2022-10-20 | Stop reason: SDUPTHER

## 2022-10-20 ENCOUNTER — OFFICE VISIT (OUTPATIENT)
Dept: INTERNAL MEDICINE CLINIC | Age: 52
End: 2022-10-20
Payer: COMMERCIAL

## 2022-10-20 VITALS
WEIGHT: 203 LBS | SYSTOLIC BLOOD PRESSURE: 138 MMHG | BODY MASS INDEX: 26.9 KG/M2 | DIASTOLIC BLOOD PRESSURE: 73 MMHG | HEIGHT: 73 IN | HEART RATE: 87 BPM

## 2022-10-20 DIAGNOSIS — I48.91 NEW ONSET ATRIAL FIBRILLATION (HCC): ICD-10-CM

## 2022-10-20 DIAGNOSIS — Z96.41 INSULIN PUMP IN PLACE: ICD-10-CM

## 2022-10-20 DIAGNOSIS — E10.9 WELL CONTROLLED TYPE 1 DIABETES MELLITUS (HCC): Primary | ICD-10-CM

## 2022-10-20 DIAGNOSIS — E10.3493: ICD-10-CM

## 2022-10-20 PROCEDURE — 99214 OFFICE O/P EST MOD 30 MIN: CPT | Performed by: INTERNAL MEDICINE

## 2022-10-20 PROCEDURE — 3044F HG A1C LEVEL LT 7.0%: CPT | Performed by: INTERNAL MEDICINE

## 2022-10-20 RX ORDER — QUINAPRIL 10 MG/1
TABLET ORAL
Qty: 90 TABLET | Refills: 3 | Status: SHIPPED | OUTPATIENT
Start: 2022-10-20

## 2022-10-20 RX ORDER — INSULIN ASPART 100 [IU]/ML
INJECTION, SOLUTION INTRAVENOUS; SUBCUTANEOUS
Qty: 80 ML | Refills: 1 | Status: SHIPPED | OUTPATIENT
Start: 2022-10-20

## 2022-10-20 ASSESSMENT — PATIENT HEALTH QUESTIONNAIRE - PHQ9
SUM OF ALL RESPONSES TO PHQ QUESTIONS 1-9: 0
2. FEELING DOWN, DEPRESSED OR HOPELESS: 0
SUM OF ALL RESPONSES TO PHQ9 QUESTIONS 1 & 2: 0
1. LITTLE INTEREST OR PLEASURE IN DOING THINGS: 0

## 2022-10-20 ASSESSMENT — ENCOUNTER SYMPTOMS
COUGH: 0
BACK PAIN: 0
CONSTIPATION: 0
VOMITING: 0
SORE THROAT: 0
NAUSEA: 0
ABDOMINAL PAIN: 0
SHORTNESS OF BREATH: 0
WHEEZING: 0
TROUBLE SWALLOWING: 0

## 2022-10-20 NOTE — PROGRESS NOTES
CC: 6 month follow up    HPI:  The patient is a 49year old with PMH HTN, HLD, DM1 complicated with DM retinopathy s/p laser and injection treatment, SCOUT on CPAP, GERD, retinal detachment x3 and anxiety here for 6 month follow up. On previous visit the patient was noted to in Afib on EKG. He was referred to Dr. Dalila Ellis, cardiology. On re-evaluation the patient was in [place] [facility]. Echo EF 55-60% with no regional wall motion or valvular abnormalities. 30 day holter monitor was unremarkable for arrhythmias. Given his EXN3OB3-DGFa 1, and single episode of PAF, he was taken off of anticoagulation. Since then the patient reports feeling well. He denies any chest pain, palpitations or shortness of breath. The patient reports his diabetes is well controlled. Last Hgb 6.3 9/2022. He endorses good physical activity. He reports he does not watch is diet carefully. [facility] for DM retinopathy injections to left eye. PMH: Per HPI  SH: Former smoker 3.75 PPY, chews smokeless tobacco, endorses daily EtOH, reports occasional marijuana use. [address]: Father: Heart disease, asthma, HTN    Review of Systems   Constitutional:  Negative for activity change, appetite change, fatigue, fever and unexpected weight change. HENT:  Negative for congestion, sore throat and trouble swallowing. Eyes:  Negative for visual disturbance. Respiratory:  Negative for cough, shortness of breath and wheezing. Cardiovascular:  Negative for chest pain and palpitations. Gastrointestinal:  Negative for abdominal pain, constipation, nausea and vomiting. Endocrine: Negative for polyuria. Genitourinary:  Negative for difficulty urinating and dysuria. Musculoskeletal:  Negative for back pain, myalgias and neck stiffness. Neurological:  Negative for dizziness, weakness, numbness and headaches. Psychiatric/Behavioral:  Negative for agitation, behavioral problems, confusion and hallucinations.  The patient is not nervous/anxious. Physical Examination: General appearance - alert, well appearing, and in no distress  HEENT-head normocephalic, atraumatic. Chronic right sided blindness  Mental status - alert, oriented to person, place, and time  Neck - supple, no significant adenopathy, no thyromegaly  Chest - clear to auscultation, no wheezes, rales or rhonchi, symmetric air entry  Heart - normal rate, regular rhythm, normal S1, S2, no murmurs, rubs, clicks or gallops  Abdomen - soft, nontender, nondistended, no masses or organomegaly  Neurological - alert, oriented, normal speech, no focal findings or movement disorder noted  Musculoskeletal - no joint tenderness, deformity or swelling  Extremities - peripheral pulses normal, no pedal edema, no clubbing or cyanosis  Skin - normal coloration and turgor, no rashes, no suspicious skin lesions noted        Encounter Diagnosis   Name Primary? Type 1 diabetes mellitus with hyperglycemia (HCC)         Type 1 DM: Last HgbA1c 6.3 9/2022. Current diabetic regiment includes Novolog 60 units daily via pump on closed loop system and quinapril 10 mg once daily. Gets retinal injection regularly for DM retinopathy. Denies any symptoms. Counseled on lifestyle modifications including diet and exercise. Follow up in 6 months. I reviewed with the resident the medical history and the resident's findings on the physical examination. I discussed with the resident the patient's diagnosis and concur with the plan.   Hemoglobin A1c last month was 6.7  Patient is followed in the diabetic clinic and he sees Dr. Frederic Tang  I told him I would see him on an as-needed basis  Electronically signed by Dannie Awad DO on 10/20/2022 at 11:27 AM   Staffed with: Dr. Juani Rojas MD

## 2022-12-21 RX ORDER — ESCITALOPRAM OXALATE 20 MG/1
20 TABLET ORAL DAILY
Qty: 90 TABLET | Refills: 2 | Status: SHIPPED | OUTPATIENT
Start: 2022-12-21

## 2023-03-01 RX ORDER — INSULIN ASPART 100 [IU]/ML
INJECTION, SOLUTION INTRAVENOUS; SUBCUTANEOUS
Qty: 80 ML | Refills: 1 | Status: SHIPPED | OUTPATIENT
Start: 2023-03-01

## 2023-03-14 DIAGNOSIS — E10.65 TYPE 1 DIABETES MELLITUS WITH HYPERGLYCEMIA (HCC): ICD-10-CM

## 2023-03-14 DIAGNOSIS — Z96.41 INSULIN PUMP IN PLACE: Primary | ICD-10-CM

## 2023-03-14 RX ORDER — INSULIN ASPART 100 [IU]/ML
INJECTION, SOLUTION INTRAVENOUS; SUBCUTANEOUS
Qty: 80 ML | Refills: 1 | Status: SHIPPED | OUTPATIENT
Start: 2023-03-14

## 2023-03-14 NOTE — TELEPHONE ENCOUNTER
Last visit- 10/20/2022  Next visit- Visit date not found    Requested Prescriptions     Pending Prescriptions Disp Refills    NOVOLOG 100 UNIT/ML injection vial 80 mL 1     Sig: INJECT AS DIRECTED PER PHYSICIAN MAX 80 UNITS PER DAY

## 2023-03-28 ENCOUNTER — HOSPITAL ENCOUNTER (OUTPATIENT)
Age: 53
Discharge: HOME | End: 2023-03-28
Payer: COMMERCIAL

## 2023-03-28 DIAGNOSIS — E78.2 MIXED HYPERLIPIDEMIA: ICD-10-CM

## 2023-03-28 DIAGNOSIS — Z12.5 SCREENING PSA (PROSTATE SPECIFIC ANTIGEN): ICD-10-CM

## 2023-03-28 DIAGNOSIS — E10.8 CONTROLLED DIABETES MELLITUS TYPE 1 WITH COMPLICATIONS (HCC): ICD-10-CM

## 2023-03-28 LAB
ALBUMIN SERPL BCG-MCNC: 4 G/DL (ref 3.5–5.1)
ALP SERPL-CCNC: 77 U/L (ref 38–126)
ALT SERPL W/O P-5'-P-CCNC: 20 U/L (ref 11–66)
ANION GAP SERPL CALC-SCNC: 8 MEQ/L (ref 8–16)
AST SERPL-CCNC: 20 U/L (ref 5–40)
BILIRUB SERPL-MCNC: 0.5 MG/DL (ref 0.3–1.2)
BUN SERPL-MCNC: 12 MG/DL (ref 7–22)
CALCIUM SERPL-MCNC: 8.9 MG/DL (ref 8.5–10.5)
CHLORIDE SERPL-SCNC: 103 MEQ/L (ref 98–111)
CHOLEST SERPL-MCNC: 154 MG/DL (ref 100–199)
CO2 SERPL-SCNC: 26 MEQ/L (ref 23–33)
CREAT SERPL-MCNC: 0.6 MG/DL (ref 0.4–1.2)
CREAT UR-MCNC: 202 MG/DL
DEPRECATED MEAN GLUCOSE BLD GHB EST-ACNC: 141 MG/DL (ref 70–126)
GFR SERPL CREATININE-BSD FRML MDRD: > 60 ML/MIN/1.73M2
GLUCOSE SERPL-MCNC: 150 MG/DL (ref 70–108)
HBA1C MFR BLD HPLC: 6.7 % (ref 4.4–6.4)
HDLC SERPL-MCNC: 55 MG/DL
LDLC SERPL CALC-MCNC: 91 MG/DL
MICROALBUMIN UR-MCNC: 1.35 MG/DL
MICROALBUMIN/CREAT RATIO PNL UR: 7 MG/G (ref 0–30)
POTASSIUM SERPL-SCNC: 4.5 MEQ/L (ref 3.5–5.2)
PROT SERPL-MCNC: 6.5 G/DL (ref 6.1–8)
PSA SERPL-MCNC: 0.68 NG/ML (ref 0–1)
SODIUM SERPL-SCNC: 137 MEQ/L (ref 135–145)
TRIGL SERPL-MCNC: 39 MG/DL (ref 0–199)

## 2023-03-28 PROCEDURE — G0103 PSA SCREENING: HCPCS

## 2023-03-28 PROCEDURE — 83036 HEMOGLOBIN GLYCOSYLATED A1C: CPT

## 2023-03-28 PROCEDURE — 36415 COLL VENOUS BLD VENIPUNCTURE: CPT

## 2023-03-28 PROCEDURE — 80053 COMPREHEN METABOLIC PANEL: CPT

## 2023-03-28 PROCEDURE — 80061 LIPID PANEL: CPT

## 2023-03-28 PROCEDURE — 82043 UR ALBUMIN QUANTITATIVE: CPT

## 2023-04-04 ENCOUNTER — OFFICE VISIT (OUTPATIENT)
Dept: FAMILY MEDICINE CLINIC | Age: 53
End: 2023-04-04
Payer: COMMERCIAL

## 2023-04-04 VITALS
HEIGHT: 72 IN | OXYGEN SATURATION: 98 % | WEIGHT: 204 LBS | SYSTOLIC BLOOD PRESSURE: 132 MMHG | RESPIRATION RATE: 16 BRPM | HEART RATE: 102 BPM | DIASTOLIC BLOOD PRESSURE: 70 MMHG | TEMPERATURE: 97.7 F | BODY MASS INDEX: 27.63 KG/M2

## 2023-04-04 DIAGNOSIS — M15.9 OSTEOARTHRITIS OF MULTIPLE JOINTS, UNSPECIFIED OSTEOARTHRITIS TYPE: Primary | ICD-10-CM

## 2023-04-04 DIAGNOSIS — G56.03 BILATERAL CARPAL TUNNEL SYNDROME: ICD-10-CM

## 2023-04-04 DIAGNOSIS — M54.2 NECK PAIN: ICD-10-CM

## 2023-04-04 PROCEDURE — 99213 OFFICE O/P EST LOW 20 MIN: CPT | Performed by: FAMILY MEDICINE

## 2023-04-04 SDOH — ECONOMIC STABILITY: FOOD INSECURITY: WITHIN THE PAST 12 MONTHS, THE FOOD YOU BOUGHT JUST DIDN'T LAST AND YOU DIDN'T HAVE MONEY TO GET MORE.: NEVER TRUE

## 2023-04-04 SDOH — ECONOMIC STABILITY: FOOD INSECURITY: WITHIN THE PAST 12 MONTHS, YOU WORRIED THAT YOUR FOOD WOULD RUN OUT BEFORE YOU GOT MONEY TO BUY MORE.: NEVER TRUE

## 2023-04-04 SDOH — ECONOMIC STABILITY: INCOME INSECURITY: HOW HARD IS IT FOR YOU TO PAY FOR THE VERY BASICS LIKE FOOD, HOUSING, MEDICAL CARE, AND HEATING?: NOT HARD AT ALL

## 2023-04-04 SDOH — ECONOMIC STABILITY: HOUSING INSECURITY
IN THE LAST 12 MONTHS, WAS THERE A TIME WHEN YOU DID NOT HAVE A STEADY PLACE TO SLEEP OR SLEPT IN A SHELTER (INCLUDING NOW)?: NO

## 2023-04-04 ASSESSMENT — PATIENT HEALTH QUESTIONNAIRE - PHQ9
SUM OF ALL RESPONSES TO PHQ QUESTIONS 1-9: 0
SUM OF ALL RESPONSES TO PHQ9 QUESTIONS 1 & 2: 0
1. LITTLE INTEREST OR PLEASURE IN DOING THINGS: 0
SUM OF ALL RESPONSES TO PHQ QUESTIONS 1-9: 0
2. FEELING DOWN, DEPRESSED OR HOPELESS: 0

## 2023-04-09 ASSESSMENT — ENCOUNTER SYMPTOMS
SINUS PRESSURE: 0
CONSTIPATION: 0
ABDOMINAL DISTENTION: 0
RHINORRHEA: 0
SHORTNESS OF BREATH: 0
ABDOMINAL PAIN: 0
COUGH: 0
SORE THROAT: 0
DIARRHEA: 0
EYE PAIN: 0
NAUSEA: 0

## 2023-04-09 NOTE — PROGRESS NOTES
Aged Out    Meningococcal (ACWY) vaccine  Aged Out         Objective:     Physical Exam  Constitutional:       General: He is not in acute distress. Appearance: He is well-developed. He is not diaphoretic. HENT:      Head: Normocephalic and atraumatic. Right Ear: External ear normal.      Left Ear: External ear normal.   Eyes:      Conjunctiva/sclera: Conjunctivae normal.   Neck:      Vascular: No JVD. Cardiovascular:      Rate and Rhythm: Normal rate and regular rhythm. Heart sounds: Normal heart sounds. Pulmonary:      Effort: Pulmonary effort is normal.      Breath sounds: Normal breath sounds. No wheezing or rales. Musculoskeletal:         General: Tenderness present. Skin:     General: Skin is warm and dry. Coloration: Skin is not pale. Neurological:      Mental Status: He is alert and oriented to person, place, and time. /70   Pulse (!) 102   Temp 97.7 °F (36.5 °C) (Oral)   Resp 16   Ht 5' 11.5\" (1.816 m)   Wt 204 lb (92.5 kg)   SpO2 98%   BMI 28.06 kg/m²       Impression/Plan:  1. Osteoarthritis of multiple joints, unspecified osteoarthritis type    2. Neck pain    3. Bilateral carpal tunnel syndrome      Requested Prescriptions      No prescriptions requested or ordered in this encounter     No orders of the defined types were placed in this encounter. Patient giveneducational materials - see patient instructions. Discussed use, benefit, and side effects of prescribed medications. All patient questions answered. Pt voiced understanding. Reviewed health maintenance. Patient agreedwith treatment plan. Follow up as directed. **This report has been created using voice recognition software. It may contain minor errorswhich are inherent in voice recognition technology. **       Electronically signed by Ct Rojas MD on 4/9/2023 at 9:06 AM

## 2023-05-04 ENCOUNTER — OFFICE VISIT (OUTPATIENT)
Dept: INTERNAL MEDICINE CLINIC | Age: 53
End: 2023-05-04
Payer: COMMERCIAL

## 2023-05-04 VITALS
HEIGHT: 72 IN | BODY MASS INDEX: 27.85 KG/M2 | HEART RATE: 84 BPM | WEIGHT: 205.6 LBS | TEMPERATURE: 97.6 F | SYSTOLIC BLOOD PRESSURE: 132 MMHG | DIASTOLIC BLOOD PRESSURE: 74 MMHG

## 2023-05-04 DIAGNOSIS — K21.9 GASTROESOPHAGEAL REFLUX DISEASE, UNSPECIFIED WHETHER ESOPHAGITIS PRESENT: ICD-10-CM

## 2023-05-04 DIAGNOSIS — E10.3493: ICD-10-CM

## 2023-05-04 DIAGNOSIS — E10.8 CONTROLLED DIABETES MELLITUS TYPE 1 WITH COMPLICATIONS (HCC): Primary | ICD-10-CM

## 2023-05-04 DIAGNOSIS — E10.41 DIABETIC MONONEUROPATHY ASSOCIATED WITH TYPE 1 DIABETES MELLITUS (HCC): ICD-10-CM

## 2023-05-04 DIAGNOSIS — F41.9 ANXIETY: ICD-10-CM

## 2023-05-04 DIAGNOSIS — I10 PRIMARY HYPERTENSION: ICD-10-CM

## 2023-05-04 DIAGNOSIS — Z96.41 INSULIN PUMP IN PLACE: ICD-10-CM

## 2023-05-04 DIAGNOSIS — E78.5 HYPERLIPIDEMIA, UNSPECIFIED HYPERLIPIDEMIA TYPE: ICD-10-CM

## 2023-05-04 PROCEDURE — 99213 OFFICE O/P EST LOW 20 MIN: CPT

## 2023-05-04 PROCEDURE — 3075F SYST BP GE 130 - 139MM HG: CPT

## 2023-05-04 PROCEDURE — 3044F HG A1C LEVEL LT 7.0%: CPT

## 2023-05-04 PROCEDURE — 3078F DIAST BP <80 MM HG: CPT

## 2023-05-04 RX ORDER — BRIMONIDINE TARTRATE 2 MG/ML
SOLUTION/ DROPS OPHTHALMIC
COMMUNITY
Start: 2023-03-27

## 2023-05-04 RX ORDER — INSULIN ASPART 100 [IU]/ML
INJECTION, SOLUTION INTRAVENOUS; SUBCUTANEOUS
Qty: 80 ML | Refills: 1 | Status: SHIPPED | OUTPATIENT
Start: 2023-05-04

## 2023-05-04 NOTE — PROGRESS NOTES
1970    Chief Complaint   Patient presents with    Diabetes     6 months - previously saw Dr. Azael Maloney / A1c 6.7 on 3/28       Mynor Mao is a 47 yom with PMHx of HTN,HLD, complicated DM1, Anxiety, GERD, who was previously a Dr. Azael Maloney pt. HPI    Eleazar Wiggins is here for his 6 month followup. Doing well, has some neuropathic pain in feet. SH: Former smoker 3.75 PPY, stopped smokeless tobacco, occasional marijuana user. Social EtOH usage   FH: no specific. DM1, Controlled with complications; proliferative retinopathy of both eyes and macular edema s/p laser and injection treatment:   3/28/23: A1c 6.7%. Avg glucose: 138. Follows podiatry and ophthalmologist. Dr. Nick Santiago. -Dexecom 6 w omnipod ; Novolog 54 units daily via pump on closed loop system. HTN:   BP 5/4/23: 132/74  BMP 3/28/23: Stable.   -On quinapril 10mg     HLD:   Lipid Panel 03/28/23: Total cholesterol: 154/ HDL 55/ LDL 91/ TG 39  -On Lipitor 10mg     Unspecified Depression/ Anxiety: Lexapro 20mg     GERD: No EGD done. On Protonix 40mg     Screening: Cologuard 4/23/22; Negative. Hx of pAfib (single episode): CHADS-VASC: 1.   On 4/20/22 spontaneous conversion to NSR. Event monitor 5/17- 6/15/2022: No arrhythmias noted. Follows Dr. Jesus Middleton, EP. Xarelto discontinued. TTE 05/2022:  Normal LV size and systolic function. EF 55%. No RWMA. No chest pain on visit. Right tractional Retinal detachment/ proliferative retinopathy of both eyes and macular edema: Right sided blindness. Hx of retinal detachment x3. L Eye; good vision. Follows Dr. Hong Veras ophthalmologist at [facility] appt 4/13/23  -On prednisolone acetate + latanoprost + Cospot   -Recieves aflibercept / Eylea injections 1x month. Hx of appendicitis s/p laparoscopic appendectomy 12/3/21. Tobacco user: Former smoker 3.75 PPY, ex tobacco user, occasional marijuana user.        Past Medical History:   Diagnosis Date    Anxiety     Detached retina 08/2018

## 2023-05-08 DIAGNOSIS — E10.41 DIABETIC MONONEUROPATHY ASSOCIATED WITH TYPE 1 DIABETES MELLITUS (HCC): ICD-10-CM

## 2023-05-08 DIAGNOSIS — E10.8 CONTROLLED DIABETES MELLITUS TYPE 1 WITH COMPLICATIONS (HCC): ICD-10-CM

## 2023-05-08 RX ORDER — GABAPENTIN 100 MG/1
100 CAPSULE ORAL 3 TIMES DAILY
Qty: 90 CAPSULE | Refills: 2 | Status: SHIPPED | OUTPATIENT
Start: 2023-05-08 | End: 2023-11-04

## 2023-05-11 RX ORDER — ATORVASTATIN CALCIUM 10 MG/1
10 TABLET, FILM COATED ORAL DAILY
Qty: 90 TABLET | Refills: 3 | Status: SHIPPED | OUTPATIENT
Start: 2023-05-11

## 2023-05-30 RX ORDER — PANTOPRAZOLE SODIUM 40 MG/1
TABLET, DELAYED RELEASE ORAL
Qty: 90 TABLET | Refills: 3 | Status: SHIPPED | OUTPATIENT
Start: 2023-05-30

## 2023-06-12 ENCOUNTER — HOSPITAL ENCOUNTER (OUTPATIENT)
Age: 53
Discharge: HOME | End: 2023-06-12
Payer: COMMERCIAL

## 2023-06-12 PROCEDURE — 84550 ASSAY OF BLOOD/URIC ACID: CPT

## 2023-06-12 PROCEDURE — 36415 COLL VENOUS BLD VENIPUNCTURE: CPT

## 2023-06-13 LAB — URATE SERPL-MCNC: 6.1 MG/DL (ref 3.7–7)

## 2023-08-10 ENCOUNTER — OFFICE VISIT (OUTPATIENT)
Dept: FAMILY MEDICINE CLINIC | Age: 53
End: 2023-08-10
Payer: COMMERCIAL

## 2023-08-10 ENCOUNTER — TELEPHONE (OUTPATIENT)
Dept: FAMILY MEDICINE CLINIC | Age: 53
End: 2023-08-10

## 2023-08-10 VITALS
RESPIRATION RATE: 8 BRPM | BODY MASS INDEX: 26.51 KG/M2 | DIASTOLIC BLOOD PRESSURE: 76 MMHG | HEIGHT: 73 IN | HEART RATE: 70 BPM | SYSTOLIC BLOOD PRESSURE: 132 MMHG | WEIGHT: 200 LBS

## 2023-08-10 DIAGNOSIS — E10.8 CONTROLLED DIABETES MELLITUS TYPE 1 WITH COMPLICATIONS (HCC): Primary | ICD-10-CM

## 2023-08-10 LAB — HBA1C MFR BLD: 6.3 %

## 2023-08-10 PROCEDURE — 3044F HG A1C LEVEL LT 7.0%: CPT | Performed by: FAMILY MEDICINE

## 2023-08-10 PROCEDURE — 83037 HB GLYCOSYLATED A1C HOME DEV: CPT | Performed by: FAMILY MEDICINE

## 2023-08-10 PROCEDURE — 99213 OFFICE O/P EST LOW 20 MIN: CPT | Performed by: FAMILY MEDICINE

## 2023-08-10 RX ORDER — TADALAFIL 5 MG/1
5 TABLET ORAL DAILY
Qty: 90 TABLET | Refills: 1 | Status: SHIPPED | OUTPATIENT
Start: 2023-08-10

## 2023-08-10 RX ORDER — RAMIPRIL 2.5 MG/1
2.5 CAPSULE ORAL DAILY
Qty: 90 CAPSULE | Refills: 3 | Status: SHIPPED | OUTPATIENT
Start: 2023-08-10

## 2023-08-10 NOTE — TELEPHONE ENCOUNTER
----- Message from Justin Ponce sent at 8/10/2023 10:52 AM EDT -----  Subject: Referral Request    Reason for referral request? lab/A1C  Provider patient wants to be referred to(if known):     Provider Phone Number(if known):     Additional Information for Provider?   ---------------------------------------------------------------------------  --------------  [address]    [phone]; OK to leave message on voicemail  ---------------------------------------------------------------------------  --------------

## 2023-08-20 ASSESSMENT — ENCOUNTER SYMPTOMS
SINUS PRESSURE: 0
DIARRHEA: 0
SORE THROAT: 0
NAUSEA: 0
RHINORRHEA: 0
SHORTNESS OF BREATH: 0
ABDOMINAL DISTENTION: 0
ABDOMINAL PAIN: 0
EYE PAIN: 0
COUGH: 0
CONSTIPATION: 0

## 2023-08-22 ENCOUNTER — OFFICE VISIT (OUTPATIENT)
Dept: FAMILY MEDICINE CLINIC | Age: 53
End: 2023-08-22
Payer: COMMERCIAL

## 2023-08-22 VITALS
DIASTOLIC BLOOD PRESSURE: 80 MMHG | RESPIRATION RATE: 16 BRPM | TEMPERATURE: 97.9 F | BODY MASS INDEX: 26.89 KG/M2 | OXYGEN SATURATION: 93 % | SYSTOLIC BLOOD PRESSURE: 138 MMHG | HEART RATE: 80 BPM | WEIGHT: 201 LBS

## 2023-08-22 DIAGNOSIS — K43.9 VENTRAL HERNIA WITHOUT OBSTRUCTION OR GANGRENE: ICD-10-CM

## 2023-08-22 DIAGNOSIS — R19.4 ALTERED BOWEL HABITS: Primary | ICD-10-CM

## 2023-08-22 PROCEDURE — 99213 OFFICE O/P EST LOW 20 MIN: CPT | Performed by: FAMILY MEDICINE

## 2023-08-23 ENCOUNTER — TELEPHONE (OUTPATIENT)
Dept: FAMILY MEDICINE CLINIC | Age: 53
End: 2023-08-23

## 2023-08-23 ASSESSMENT — ENCOUNTER SYMPTOMS
DIARRHEA: 1
ABDOMINAL DISTENTION: 0
SORE THROAT: 0
ABDOMINAL PAIN: 0
NAUSEA: 0
RHINORRHEA: 0
COUGH: 0
SINUS PRESSURE: 0
EYE PAIN: 0
SHORTNESS OF BREATH: 0
CONSTIPATION: 1

## 2023-08-23 NOTE — TELEPHONE ENCOUNTER
----- Message from [employer] Quang sent at 8/23/2023 12:39 PM EDT -----  Regarding: Visit Follow-Up Question  Contact: [phone]  Good Afternoon Dr. Bruce Zepeda,    One quick question in regards to the hernia in my stomach. Am I able to do exercises. Push Ups, Sit Ups, Free Weights?     Thanks [employer] book

## 2023-08-23 NOTE — PROGRESS NOTES
[address] MEDICINE  [address] [facility] [address]  Dept: [phone]  Dept Fax: [phone]  Loc: [phone]  PROGRESS NOTE      VisitDate: 8/22/2023    Leonor Castaneda is a 47 y.o. male who presents today for:  Chief Complaint   Patient presents with    Constipation     And diarrhea on and off for the past week. He states when he leans back he notices a bump on his abdominal area. Impression/Plan:  1. Altered bowel habits    2. Ventral hernia without obstruction or gangrene      Requested Prescriptions      No prescriptions requested or ordered in this encounter     No orders of the defined types were placed in this encounter. Counseled diet for altered bowel pattern. Discussed expected management regarding essential hernia is very low risk for incarceration strangulation. Subjective:  HPI  Patient resents with complaint of altered bowel habits. He said\" between but he denies constipation or diarrhea. He has noticed some protrusion in his mid abdomen. This is not painful. He said no melena hematochezia. No fevers or chills. Review of Systems   Constitutional:  Negative for appetite change and fever. HENT:  Negative for congestion, ear pain, postnasal drip, rhinorrhea, sinus pressure and sore throat. Eyes:  Negative for pain and visual disturbance. Respiratory:  Negative for cough and shortness of breath. Cardiovascular:  Negative for chest pain. Gastrointestinal:  Positive for constipation and diarrhea. Negative for abdominal distention, abdominal pain and nausea. Genitourinary:  Negative for dysuria, frequency and urgency. Musculoskeletal:  Negative for arthralgias. Skin:  Negative for rash. Neurological:  Negative for dizziness.    Past Medical History:   Diagnosis Date    Anxiety     Detached retina 08/2018    right    Diabetic retinopathy ([address])     bilat, has had laser and injections    Ganglion cyst

## 2023-08-24 NOTE — TELEPHONE ENCOUNTER
Yes you are able to do those things.   The only caution would be anything that causes pain or discomfort in the abdominal area should be avoided

## 2023-09-07 RX ORDER — ESCITALOPRAM OXALATE 20 MG/1
TABLET ORAL
Qty: 90 TABLET | Refills: 3 | Status: SHIPPED | OUTPATIENT
Start: 2023-09-07

## 2023-10-19 ENCOUNTER — IMMUNIZATION (OUTPATIENT)
Dept: FAMILY MEDICINE CLINIC | Age: 53
End: 2023-10-19
Payer: COMMERCIAL

## 2023-10-19 DIAGNOSIS — Z23 NEED FOR INFLUENZA VACCINATION: Primary | ICD-10-CM

## 2023-10-19 PROCEDURE — 90674 CCIIV4 VAC NO PRSV 0.5 ML IM: CPT | Performed by: FAMILY MEDICINE

## 2023-10-19 PROCEDURE — 90471 IMMUNIZATION ADMIN: CPT | Performed by: FAMILY MEDICINE

## 2023-10-19 NOTE — PROGRESS NOTES
Immunization(s) given during visit:    Immunizations Administered       Name Date Dose Route    Influenza, FLUCELVAX, (age 10 mo+), MDCK, PF, 0.5mL 10/19/2023 0.5 mL Intramuscular    Site: Deltoid- Right    Lot: [id number]    NDC: 13322-454-69            Most recent Vaccine Information Sheet dated  given to pt

## 2023-11-13 DIAGNOSIS — E10.65 TYPE 1 DIABETES MELLITUS WITH HYPERGLYCEMIA (HCC): ICD-10-CM

## 2023-11-13 RX ORDER — ESCITALOPRAM OXALATE 20 MG/1
20 TABLET ORAL DAILY
Qty: 90 TABLET | Refills: 2 | Status: SHIPPED | OUTPATIENT
Start: 2023-11-13

## 2023-11-13 RX ORDER — QUINAPRIL 10 MG/1
TABLET ORAL
Qty: 90 TABLET | Refills: 3 | OUTPATIENT
Start: 2023-11-13

## 2023-11-13 NOTE — TELEPHONE ENCOUNTER
[facility]  H&P reviewed. No changes since last visit.  Patient states   % improvement since the last procedure/injection.    Diagnosis     * Degeneration of cervical intervertebral disc [M50.30]     * Spinal stenosis, cervical region [M48.02]     * Cervical radiculopathy [M54.12]      Airway assessed since last visit. Airway class equals: 2.  His pain originates in his cervical spine and radiates to both shoulders and his right hand.  Today his pain ranges between a 0 and a 5/10.  He is here for cervical epidural steroid injection #3.     LOV 8-22-23

## 2023-11-14 ENCOUNTER — OFFICE VISIT (OUTPATIENT)
Dept: INTERNAL MEDICINE CLINIC | Age: 53
End: 2023-11-14

## 2023-11-14 VITALS
TEMPERATURE: 97.6 F | BODY MASS INDEX: 27.23 KG/M2 | SYSTOLIC BLOOD PRESSURE: 136 MMHG | WEIGHT: 203.6 LBS | RESPIRATION RATE: 18 BRPM | DIASTOLIC BLOOD PRESSURE: 80 MMHG | HEART RATE: 101 BPM | OXYGEN SATURATION: 98 %

## 2023-11-14 DIAGNOSIS — E78.5 HYPERLIPIDEMIA, UNSPECIFIED HYPERLIPIDEMIA TYPE: ICD-10-CM

## 2023-11-14 DIAGNOSIS — E10.65 TYPE 1 DIABETES MELLITUS WITH HYPERGLYCEMIA (HCC): Primary | ICD-10-CM

## 2023-11-14 DIAGNOSIS — E10.41 DIABETIC MONONEUROPATHY ASSOCIATED WITH TYPE 1 DIABETES MELLITUS (HCC): ICD-10-CM

## 2023-11-14 LAB — HBA1C MFR BLD: 6.2 % (ref 4.3–5.7)

## 2023-11-14 RX ORDER — ATORVASTATIN CALCIUM 40 MG/1
40 TABLET, FILM COATED ORAL DAILY
Qty: 30 TABLET | Refills: 3 | Status: SHIPPED | OUTPATIENT
Start: 2023-11-14

## 2023-11-14 RX ORDER — GABAPENTIN 100 MG/1
100 CAPSULE ORAL 3 TIMES DAILY
Qty: 90 CAPSULE | Refills: 2 | Status: SHIPPED | OUTPATIENT
Start: 2023-11-14 | End: 2024-05-12

## 2023-11-14 RX ORDER — DIFLUPREDNATE OPHTHALMIC 0.5 MG/ML
EMULSION OPHTHALMIC
COMMUNITY
Start: 2023-09-07

## 2023-11-14 NOTE — PROGRESS NOTES
1970    Chief Complaint   Patient presents with    Diabetes    Hypertension    Hyperlipidemia    Gastroesophageal Reflux     Kailyn Tejada is a 47 yom with PMHx of HTN,HLD, complicated DM1, Anxiety, GERD, who was previously a Dr. May Cerna pt. SH: Former smoker 3.75 PPY, stopped smokeless tobacco, occasional marijuana user. Social EtOH usage   FH: no specific. HPI     Zachary Wilson is here for his 6 month followup. Doing well, has new acute issues. DM1, Controlled with complications; proliferative retinopathy of both eyes and macular edema s/p laser and injection treatment:   Diabetes Mellitus -  Last HgA1c 6.3 - controlled, on Tandem w omnipod; Novolog 54 units daily via pump on closed loop system. 8/10/23: A1c 6.3%.    11/14/23: A1c 6.2%. Follows podiatry and ophthalmologist. Dr. Elliot Thomas. Patient on Tandem. FSBS ranges 140, see media section for specific numbers. Normal renal function, eGFR >60, normal microalbumin/Creat: 12. LDL 91. Patient is on an ASA, ramipril, and statin. HTN:   BP 5/4/23: 132/74  BP 11/14/23: 136/80  BMP 3/28/23: Stable.   -On quinapril 10mg      HLD:   Lipid Panel 03/28/23: Total cholesterol: 154/ HDL 55/ LDL 91/ TG 39  -On Lipitor 10mg, will increase to Lipitor 40mg. The 10-year ASCVD risk score (Julia GORDON, et al., 2019) is: 5.2%    Values used to calculate the score:      Age: 47 years      Sex: Male      Is Non- : No      Diabetic: Yes      Tobacco smoker: No      Systolic Blood Pressure: 414 mmHg      Is BP treated: No      HDL Cholesterol: 55 mg/dL      Total Cholesterol: 154 mg/dL    Unspecified Depression/ Anxiety: Lexapro 20mg      GERD: No EGD done. On Protonix 40mg       Hx of pAfib (single episode): CHADS-VASC: 1.   On 4/20/22 spontaneous conversion to NSR. Event monitor 5/17- 6/15/2022: No arrhythmias noted. Follows Dr. Harris Yousif EP. Xarelto discontinued. TTE 05/2022:  Normal LV size and systolic function. EF 55%. No RWMA.

## 2023-12-16 DIAGNOSIS — E78.5 HYPERLIPIDEMIA, UNSPECIFIED HYPERLIPIDEMIA TYPE: ICD-10-CM

## 2023-12-18 RX ORDER — ATORVASTATIN CALCIUM 40 MG/1
40 TABLET, FILM COATED ORAL DAILY
Qty: 90 TABLET | Refills: 1 | OUTPATIENT
Start: 2023-12-18

## 2024-01-08 DIAGNOSIS — E78.5 HYPERLIPIDEMIA, UNSPECIFIED HYPERLIPIDEMIA TYPE: ICD-10-CM

## 2024-01-08 RX ORDER — QUINAPRIL 10 MG/1
TABLET ORAL
Qty: 90 TABLET | Refills: 3 | OUTPATIENT
Start: 2024-01-08

## 2024-01-08 RX ORDER — ATORVASTATIN CALCIUM 40 MG/1
40 TABLET, FILM COATED ORAL DAILY
Qty: 90 TABLET | Refills: 1 | OUTPATIENT
Start: 2024-01-08

## 2024-03-04 ENCOUNTER — OFFICE VISIT (OUTPATIENT)
Dept: FAMILY MEDICINE CLINIC | Age: 54
End: 2024-03-04
Payer: COMMERCIAL

## 2024-03-04 VITALS
HEIGHT: 73 IN | BODY MASS INDEX: 27.43 KG/M2 | HEART RATE: 80 BPM | SYSTOLIC BLOOD PRESSURE: 120 MMHG | WEIGHT: 207 LBS | DIASTOLIC BLOOD PRESSURE: 70 MMHG | TEMPERATURE: 97.9 F | RESPIRATION RATE: 16 BRPM

## 2024-03-04 DIAGNOSIS — J02.9 SORE THROAT: ICD-10-CM

## 2024-03-04 DIAGNOSIS — H65.02 NON-RECURRENT ACUTE SEROUS OTITIS MEDIA OF LEFT EAR: ICD-10-CM

## 2024-03-04 DIAGNOSIS — H61.23 BILATERAL IMPACTED CERUMEN: ICD-10-CM

## 2024-03-04 DIAGNOSIS — E10.29 CONTROLLED TYPE 1 DIABETES MELLITUS WITH OTHER DIABETIC KIDNEY COMPLICATION (HCC): ICD-10-CM

## 2024-03-04 DIAGNOSIS — H60.92 OTITIS EXTERNA OF LEFT EAR, UNSPECIFIED CHRONICITY, UNSPECIFIED TYPE: Primary | ICD-10-CM

## 2024-03-04 LAB
HBA1C MFR BLD: 6.5 %
STREPTOCOCCUS A RNA: NEGATIVE

## 2024-03-04 PROCEDURE — 83036 HEMOGLOBIN GLYCOSYLATED A1C: CPT | Performed by: NURSE PRACTITIONER

## 2024-03-04 PROCEDURE — 99214 OFFICE O/P EST MOD 30 MIN: CPT | Performed by: NURSE PRACTITIONER

## 2024-03-04 PROCEDURE — 87651 STREP A DNA AMP PROBE: CPT | Performed by: NURSE PRACTITIONER

## 2024-03-04 PROCEDURE — 3044F HG A1C LEVEL LT 7.0%: CPT | Performed by: NURSE PRACTITIONER

## 2024-03-04 RX ORDER — CIPROFLOXACIN AND DEXAMETHASONE 3; 1 MG/ML; MG/ML
4 SUSPENSION/ DROPS AURICULAR (OTIC) 2 TIMES DAILY
Qty: 1 EACH | Refills: 2 | Status: SHIPPED | OUTPATIENT
Start: 2024-03-04 | End: 2024-03-11

## 2024-03-04 RX ORDER — CEFDINIR 300 MG/1
300 CAPSULE ORAL 2 TIMES DAILY
Qty: 20 CAPSULE | Refills: 0 | Status: SHIPPED | OUTPATIENT
Start: 2024-03-04 | End: 2024-03-14

## 2024-03-04 RX ORDER — TADALAFIL 5 MG/1
5 TABLET ORAL DAILY
Qty: 90 TABLET | Refills: 3 | Status: SHIPPED | OUTPATIENT
Start: 2024-03-04

## 2024-03-04 ASSESSMENT — ENCOUNTER SYMPTOMS
SHORTNESS OF BREATH: 0
CONSTIPATION: 0
VOMITING: 0
NAUSEA: 0
WHEEZING: 0
BACK PAIN: 0
COUGH: 0
EYE PAIN: 0
DIARRHEA: 0
TROUBLE SWALLOWING: 0
ALLERGIC/IMMUNOLOGIC NEGATIVE: 1
RHINORRHEA: 0
EYE DISCHARGE: 0
ABDOMINAL PAIN: 0
SORE THROAT: 0
EYE REDNESS: 0

## 2024-03-04 ASSESSMENT — PATIENT HEALTH QUESTIONNAIRE - PHQ9
2. FEELING DOWN, DEPRESSED OR HOPELESS: 0
SUM OF ALL RESPONSES TO PHQ QUESTIONS 1-9: 0
SUM OF ALL RESPONSES TO PHQ9 QUESTIONS 1 & 2: 0
1. LITTLE INTEREST OR PLEASURE IN DOING THINGS: 0
SUM OF ALL RESPONSES TO PHQ QUESTIONS 1-9: 0

## 2024-03-20 ENCOUNTER — TELEPHONE (OUTPATIENT)
Dept: FAMILY MEDICINE CLINIC | Age: 54
End: 2024-03-20

## 2024-03-20 DIAGNOSIS — H60.92 OTITIS EXTERNA OF LEFT EAR, UNSPECIFIED CHRONICITY, UNSPECIFIED TYPE: Primary | ICD-10-CM

## 2024-03-20 DIAGNOSIS — H65.02 NON-RECURRENT ACUTE SEROUS OTITIS MEDIA OF LEFT EAR: ICD-10-CM

## 2024-03-20 RX ORDER — CIPROFLOXACIN AND DEXAMETHASONE 3; 1 MG/ML; MG/ML
4 SUSPENSION/ DROPS AURICULAR (OTIC) 2 TIMES DAILY
Qty: 1 EACH | Refills: 0 | Status: SHIPPED | OUTPATIENT
Start: 2024-03-20 | End: 2024-03-27

## 2024-03-20 RX ORDER — AMOXICILLIN AND CLAVULANATE POTASSIUM 500; 125 MG/1; MG/1
1 TABLET, FILM COATED ORAL 2 TIMES DAILY
Qty: 20 TABLET | Refills: 0 | Status: SHIPPED | OUTPATIENT
Start: 2024-03-20 | End: 2024-03-30

## 2024-03-20 NOTE — TELEPHONE ENCOUNTER
Pt seen 3/4 for Otitis externa and ASOM of left ear, ST. Tx with omnicef and ciprodex. States he only took half of the Rx because he started feeling better and now sx have returned not as bad the last 2 days. He threw away the remainder of the rx and is wanting to see if you would refill the rx.    [facility]   [facility]

## 2024-03-25 ENCOUNTER — HOSPITAL ENCOUNTER (INPATIENT)
Age: 54
LOS: 2 days | Discharge: HOME | DRG: 084 | End: 2024-03-27
Attending: EMERGENCY MEDICINE | Admitting: SURGERY
Payer: COMMERCIAL

## 2024-03-25 ENCOUNTER — APPOINTMENT (OUTPATIENT)
Dept: CT IMAGING | Age: 54
DRG: 084 | End: 2024-03-25
Payer: COMMERCIAL

## 2024-03-25 ENCOUNTER — APPOINTMENT (OUTPATIENT)
Dept: GENERAL RADIOLOGY | Age: 54
DRG: 084 | End: 2024-03-25
Payer: COMMERCIAL

## 2024-03-25 DIAGNOSIS — S06.5XAA SUBDURAL HEMATOMA (HCC): Primary | ICD-10-CM
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
## 2024-03-25 LAB
ANION GAP SERPL CALC-SCNC: 14 MEQ/L (ref 8–16)
BASOPHILS ABSOLUTE: 0.1 THOU/MM3 (ref 0–0.1)
BASOPHILS NFR BLD AUTO: 0.9 %
BUN SERPL-MCNC: 14 MG/DL (ref 7–22)
CALCIUM SERPL-MCNC: 9 MG/DL (ref 8.5–10.5)
CHLORIDE SERPL-SCNC: 105 MEQ/L (ref 98–111)
CO2 SERPL-SCNC: 23 MEQ/L (ref 23–33)
CREAT SERPL-MCNC: 0.9 MG/DL (ref 0.4–1.2)
DEPRECATED RDW RBC AUTO: 40.2 FL (ref 35–45)
EOSINOPHIL NFR BLD AUTO: 3.7 %
EOSINOPHILS ABSOLUTE: 0.2 THOU/MM3 (ref 0–0.4)
ERYTHROCYTE [DISTWIDTH] IN BLOOD BY AUTOMATED COUNT: 13 % (ref 11.5–14.5)
GFR SERPL CREATININE-BSD FRML MDRD: > 90 ML/MIN/1.73M2
GLUCOSE BLD STRIP.AUTO-MCNC: 143 MG/DL (ref 70–108)
GLUCOSE BLD STRIP.AUTO-MCNC: 186 MG/DL (ref 70–108)
GLUCOSE BLD STRIP.AUTO-MCNC: 210 MG/DL (ref 70–108)
GLUCOSE BLD STRIP.AUTO-MCNC: 265 MG/DL (ref 70–108)
GLUCOSE SERPL-MCNC: 113 MG/DL (ref 70–108)
HCT VFR BLD AUTO: 43 % (ref 42–52)
HGB BLD-MCNC: 14.7 GM/DL (ref 14–18)
IMM GRANULOCYTES # BLD AUTO: 0.02 THOU/MM3 (ref 0–0.07)
IMM GRANULOCYTES NFR BLD AUTO: 0.3 %
LYMPHOCYTES ABSOLUTE: 2.6 THOU/MM3 (ref 1–4.8)
LYMPHOCYTES NFR BLD AUTO: 39.9 %
MAGNESIUM SERPL-MCNC: 2.1 MG/DL (ref 1.6–2.4)
MCH RBC QN AUTO: 29.6 PG (ref 26–33)
MCHC RBC AUTO-ENTMCNC: 34.2 GM/DL (ref 32.2–35.5)
MCV RBC AUTO: 86.5 FL (ref 80–94)
MONOCYTES ABSOLUTE: 0.5 THOU/MM3 (ref 0.4–1.3)
MONOCYTES NFR BLD AUTO: 7 %
NEUTROPHILS NFR BLD AUTO: 48.2 %
NRBC BLD AUTO-RTO: 0 /100 WBC
OSMOLALITY SERPL CALC.SUM OF ELEC: 284.4 MOSMOL/KG (ref 275–300)
PLATELET # BLD AUTO: 262 THOU/MM3 (ref 130–400)
PMV BLD AUTO: 10.5 FL (ref 9.4–12.4)
POTASSIUM SERPL-SCNC: 3.8 MEQ/L (ref 3.5–5.2)
RBC # BLD AUTO: 4.97 MILL/MM3 (ref 4.7–6.1)
SEGMENTED NEUTROPHILS ABSOLUTE COUNT: 3.1 THOU/MM3 (ref 1.8–7.7)
SODIUM SERPL-SCNC: 142 MEQ/L (ref 135–145)
TROPONIN, HIGH SENSITIVITY: 6 NG/L (ref 0–12)
TROPONIN, HIGH SENSITIVITY: 7 NG/L (ref 0–12)
WBC # BLD AUTO: 6.5 THOU/MM3 (ref 4.8–10.8)

## 2024-03-25 PROCEDURE — 84484 ASSAY OF TROPONIN QUANT: CPT

## 2024-03-25 PROCEDURE — 92523 SPEECH SOUND LANG COMPREHEN: CPT

## 2024-03-25 PROCEDURE — 6820000001 HC L2 TRAUMA SURGERY EVALUATION: Performed by: SURGERY

## 2024-03-25 PROCEDURE — 82948 REAGENT STRIP/BLOOD GLUCOSE: CPT

## 2024-03-25 PROCEDURE — 2500000003 HC RX 250 WO HCPCS

## 2024-03-25 PROCEDURE — 99285 EMERGENCY DEPT VISIT HI MDM: CPT

## 2024-03-25 PROCEDURE — 6370000000 HC RX 637 (ALT 250 FOR IP): Performed by: NURSE PRACTITIONER

## 2024-03-25 PROCEDURE — 72125 CT NECK SPINE W/O DYE: CPT

## 2024-03-25 PROCEDURE — 92610 EVALUATE SWALLOWING FUNCTION: CPT

## 2024-03-25 PROCEDURE — 85025 COMPLETE CBC W/AUTO DIFF WBC: CPT

## 2024-03-25 PROCEDURE — 36415 COLL VENOUS BLD VENIPUNCTURE: CPT

## 2024-03-25 PROCEDURE — 83735 ASSAY OF MAGNESIUM: CPT

## 2024-03-25 PROCEDURE — 2580000003 HC RX 258: Performed by: NURSE PRACTITIONER

## 2024-03-25 PROCEDURE — 99291 CRITICAL CARE FIRST HOUR: CPT | Performed by: INTERNAL MEDICINE

## 2024-03-25 PROCEDURE — 71045 X-RAY EXAM CHEST 1 VIEW: CPT

## 2024-03-25 PROCEDURE — 99222 1ST HOSP IP/OBS MODERATE 55: CPT | Performed by: NEUROLOGICAL SURGERY

## 2024-03-25 PROCEDURE — 6370000000 HC RX 637 (ALT 250 FOR IP)

## 2024-03-25 PROCEDURE — 93010 ELECTROCARDIOGRAM REPORT: CPT | Performed by: INTERNAL MEDICINE

## 2024-03-25 PROCEDURE — 80048 BASIC METABOLIC PNL TOTAL CA: CPT

## 2024-03-25 PROCEDURE — 2100000000 HC CCU R&B

## 2024-03-25 PROCEDURE — 93005 ELECTROCARDIOGRAM TRACING: CPT

## 2024-03-25 PROCEDURE — 70450 CT HEAD/BRAIN W/O DYE: CPT

## 2024-03-25 PROCEDURE — 96374 THER/PROPH/DIAG INJ IV PUSH: CPT

## 2024-03-25 PROCEDURE — 99222 1ST HOSP IP/OBS MODERATE 55: CPT | Performed by: SURGERY

## 2024-03-25 RX ORDER — LATANOPROST 50 UG/ML
1 SOLUTION/ DROPS OPHTHALMIC NIGHTLY
Status: DISCONTINUED | OUTPATIENT
Start: 2024-03-25 | End: 2024-03-27 | Stop reason: HOSPADM

## 2024-03-25 RX ORDER — ACETAMINOPHEN 325 MG/1
650 TABLET ORAL EVERY 4 HOURS PRN
Status: DISCONTINUED | OUTPATIENT
Start: 2024-03-25 | End: 2024-03-27 | Stop reason: HOSPADM

## 2024-03-25 RX ORDER — LISINOPRIL 5 MG/1
5 TABLET ORAL DAILY
Status: DISCONTINUED | OUTPATIENT
Start: 2024-03-25 | End: 2024-03-27 | Stop reason: HOSPADM

## 2024-03-25 RX ORDER — OXYCODONE HYDROCHLORIDE 5 MG/1
5 TABLET ORAL EVERY 4 HOURS PRN
Status: DISCONTINUED | OUTPATIENT
Start: 2024-03-25 | End: 2024-03-27 | Stop reason: HOSPADM

## 2024-03-25 RX ORDER — BRIMONIDINE TARTRATE 2 MG/ML
1 SOLUTION/ DROPS OPHTHALMIC 2 TIMES DAILY
Status: DISCONTINUED | OUTPATIENT
Start: 2024-03-25 | End: 2024-03-27 | Stop reason: HOSPADM

## 2024-03-25 RX ORDER — TRANEXAMIC ACID 10 MG/ML
1000 INJECTION, SOLUTION INTRAVENOUS ONCE
Status: COMPLETED | OUTPATIENT
Start: 2024-03-25 | End: 2024-03-25

## 2024-03-25 RX ORDER — DORZOLAMIDE HYDROCHLORIDE AND TIMOLOL MALEATE 20; 5 MG/ML; MG/ML
1 SOLUTION/ DROPS OPHTHALMIC 2 TIMES DAILY
COMMUNITY

## 2024-03-25 RX ORDER — POTASSIUM CHLORIDE 7.45 MG/ML
10 INJECTION INTRAVENOUS PRN
Status: DISCONTINUED | OUTPATIENT
Start: 2024-03-25 | End: 2024-03-27 | Stop reason: HOSPADM

## 2024-03-25 RX ORDER — DORZOLAMIDE HYDROCHLORIDE AND TIMOLOL MALEATE 20; 5 MG/ML; MG/ML
1 SOLUTION/ DROPS OPHTHALMIC 2 TIMES DAILY
Status: DISCONTINUED | OUTPATIENT
Start: 2024-03-25 | End: 2024-03-27 | Stop reason: HOSPADM

## 2024-03-25 RX ORDER — SODIUM CHLORIDE 0.9 % (FLUSH) 0.9 %
5-40 SYRINGE (ML) INJECTION PRN
Status: DISCONTINUED | OUTPATIENT
Start: 2024-03-25 | End: 2024-03-27 | Stop reason: HOSPADM

## 2024-03-25 RX ORDER — BISACODYL 10 MG
10 SUPPOSITORY, RECTAL RECTAL DAILY PRN
Status: DISCONTINUED | OUTPATIENT
Start: 2024-03-25 | End: 2024-03-27 | Stop reason: HOSPADM

## 2024-03-25 RX ORDER — INSULIN LISPRO 100 [IU]/ML
0-8 INJECTION, SOLUTION INTRAVENOUS; SUBCUTANEOUS
Status: DISCONTINUED | OUTPATIENT
Start: 2024-03-25 | End: 2024-03-25

## 2024-03-25 RX ORDER — ESCITALOPRAM OXALATE 20 MG/1
20 TABLET ORAL DAILY
Status: DISCONTINUED | OUTPATIENT
Start: 2024-03-25 | End: 2024-03-27 | Stop reason: HOSPADM

## 2024-03-25 RX ORDER — INSULIN LISPRO 100 [IU]/ML
0-4 INJECTION, SOLUTION INTRAVENOUS; SUBCUTANEOUS NIGHTLY
Status: DISCONTINUED | OUTPATIENT
Start: 2024-03-25 | End: 2024-03-26

## 2024-03-25 RX ORDER — GLUCAGON 1 MG/ML
1 KIT INJECTION PRN
Status: DISCONTINUED | OUTPATIENT
Start: 2024-03-25 | End: 2024-03-26

## 2024-03-25 RX ORDER — AMOXICILLIN AND CLAVULANATE POTASSIUM 500; 125 MG/1; MG/1
1 TABLET, FILM COATED ORAL 2 TIMES DAILY
Status: DISCONTINUED | OUTPATIENT
Start: 2024-03-25 | End: 2024-03-27 | Stop reason: HOSPADM

## 2024-03-25 RX ORDER — INSULIN LISPRO 100 [IU]/ML
0-16 INJECTION, SOLUTION INTRAVENOUS; SUBCUTANEOUS
Status: DISCONTINUED | OUTPATIENT
Start: 2024-03-25 | End: 2024-03-26

## 2024-03-25 RX ORDER — INSULIN LISPRO 100 [IU]/ML
0-4 INJECTION, SOLUTION INTRAVENOUS; SUBCUTANEOUS NIGHTLY
Status: DISCONTINUED | OUTPATIENT
Start: 2024-03-25 | End: 2024-03-25

## 2024-03-25 RX ORDER — SODIUM CHLORIDE 9 MG/ML
INJECTION, SOLUTION INTRAVENOUS CONTINUOUS
Status: DISCONTINUED | OUTPATIENT
Start: 2024-03-25 | End: 2024-03-26

## 2024-03-25 RX ORDER — POTASSIUM CHLORIDE 7.45 MG/ML
20 INJECTION INTRAVENOUS PRN
Status: DISCONTINUED | OUTPATIENT
Start: 2024-03-25 | End: 2024-03-27 | Stop reason: HOSPADM

## 2024-03-25 RX ORDER — SODIUM CHLORIDE 0.9 % (FLUSH) 0.9 %
5-40 SYRINGE (ML) INJECTION EVERY 12 HOURS SCHEDULED
Status: DISCONTINUED | OUTPATIENT
Start: 2024-03-25 | End: 2024-03-27 | Stop reason: HOSPADM

## 2024-03-25 RX ORDER — ACETAMINOPHEN 500 MG
1000 TABLET ORAL ONCE
Status: COMPLETED | OUTPATIENT
Start: 2024-03-25 | End: 2024-03-25

## 2024-03-25 RX ORDER — CIPROFLOXACIN AND DEXAMETHASONE 3; 1 MG/ML; MG/ML
4 SUSPENSION/ DROPS AURICULAR (OTIC) 2 TIMES DAILY
Status: DISCONTINUED | OUTPATIENT
Start: 2024-03-25 | End: 2024-03-27 | Stop reason: HOSPADM

## 2024-03-25 RX ORDER — POLYETHYLENE GLYCOL 3350 17 G/17G
17 POWDER, FOR SOLUTION ORAL DAILY
Status: DISCONTINUED | OUTPATIENT
Start: 2024-03-25 | End: 2024-03-27 | Stop reason: HOSPADM

## 2024-03-25 RX ORDER — SODIUM CHLORIDE 9 MG/ML
INJECTION, SOLUTION INTRAVENOUS PRN
Status: DISCONTINUED | OUTPATIENT
Start: 2024-03-25 | End: 2024-03-27 | Stop reason: HOSPADM

## 2024-03-25 RX ORDER — DIFLUPREDNATE OPHTHALMIC 0.5 MG/ML
1 EMULSION OPHTHALMIC EVERY 12 HOURS
Status: DISCONTINUED | OUTPATIENT
Start: 2024-03-25 | End: 2024-03-25

## 2024-03-25 RX ORDER — ATORVASTATIN CALCIUM 40 MG/1
40 TABLET, FILM COATED ORAL DAILY
Status: DISCONTINUED | OUTPATIENT
Start: 2024-03-25 | End: 2024-03-27 | Stop reason: HOSPADM

## 2024-03-25 RX ORDER — INSULIN GLARGINE 100 [IU]/ML
15 INJECTION, SOLUTION SUBCUTANEOUS NIGHTLY
Status: DISCONTINUED | OUTPATIENT
Start: 2024-03-25 | End: 2024-03-26

## 2024-03-25 RX ORDER — PANTOPRAZOLE SODIUM 40 MG/1
40 TABLET, DELAYED RELEASE ORAL DAILY
Status: DISCONTINUED | OUTPATIENT
Start: 2024-03-25 | End: 2024-03-27 | Stop reason: HOSPADM

## 2024-03-25 RX ORDER — INSULIN LISPRO 100 [IU]/ML
0-16 INJECTION, SOLUTION INTRAVENOUS; SUBCUTANEOUS EVERY 4 HOURS
Status: DISCONTINUED | OUTPATIENT
Start: 2024-03-25 | End: 2024-03-25

## 2024-03-25 RX ORDER — ONDANSETRON 4 MG/1
4 TABLET, ORALLY DISINTEGRATING ORAL EVERY 8 HOURS PRN
Status: DISCONTINUED | OUTPATIENT
Start: 2024-03-25 | End: 2024-03-27 | Stop reason: HOSPADM

## 2024-03-25 RX ORDER — MORPHINE SULFATE 2 MG/ML
2 INJECTION, SOLUTION INTRAMUSCULAR; INTRAVENOUS EVERY 4 HOURS PRN
Status: DISCONTINUED | OUTPATIENT
Start: 2024-03-25 | End: 2024-03-27 | Stop reason: HOSPADM

## 2024-03-25 RX ORDER — DEXTROSE MONOHYDRATE 100 MG/ML
INJECTION, SOLUTION INTRAVENOUS CONTINUOUS PRN
Status: DISCONTINUED | OUTPATIENT
Start: 2024-03-25 | End: 2024-03-27 | Stop reason: HOSPADM

## 2024-03-25 RX ORDER — INSULIN LISPRO 100 [IU]/ML
0-4 INJECTION, SOLUTION INTRAVENOUS; SUBCUTANEOUS
Status: DISCONTINUED | OUTPATIENT
Start: 2024-03-25 | End: 2024-03-25

## 2024-03-25 RX ORDER — ONDANSETRON 2 MG/ML
4 INJECTION INTRAMUSCULAR; INTRAVENOUS EVERY 6 HOURS PRN
Status: DISCONTINUED | OUTPATIENT
Start: 2024-03-25 | End: 2024-03-27 | Stop reason: HOSPADM

## 2024-03-25 RX ORDER — MAGNESIUM SULFATE IN WATER 40 MG/ML
2000 INJECTION, SOLUTION INTRAVENOUS PRN
Status: DISCONTINUED | OUTPATIENT
Start: 2024-03-25 | End: 2024-03-27 | Stop reason: HOSPADM

## 2024-03-25 RX ORDER — BISACODYL 5 MG/1
5 TABLET, DELAYED RELEASE ORAL DAILY PRN
Status: DISCONTINUED | OUTPATIENT
Start: 2024-03-25 | End: 2024-03-27 | Stop reason: HOSPADM

## 2024-03-25 RX ORDER — LEVETIRACETAM 500 MG/1
500 TABLET ORAL 2 TIMES DAILY
Status: DISCONTINUED | OUTPATIENT
Start: 2024-03-25 | End: 2024-03-27 | Stop reason: HOSPADM

## 2024-03-25 RX ORDER — PREDNISOLONE ACETATE 10 MG/ML
1 SUSPENSION/ DROPS OPHTHALMIC 2 TIMES DAILY
Status: DISCONTINUED | OUTPATIENT
Start: 2024-03-25 | End: 2024-03-25

## 2024-03-25 RX ADMIN — SODIUM CHLORIDE: 9 INJECTION, SOLUTION INTRAVENOUS at 12:20

## 2024-03-25 RX ADMIN — DORZOLAMIDE HYDROCHLORIDE AND TIMOLOL MALEATE 1 DROP: 20; 5 SOLUTION/ DROPS OPHTHALMIC at 11:59

## 2024-03-25 RX ADMIN — BRIMONIDINE TARTRATE 1 DROP: 2 SOLUTION OPHTHALMIC at 19:59

## 2024-03-25 RX ADMIN — ACETAMINOPHEN 1000 MG: 500 TABLET ORAL at 07:41

## 2024-03-25 RX ADMIN — INSULIN LISPRO 1 UNITS: 100 INJECTION, SOLUTION INTRAVENOUS; SUBCUTANEOUS at 12:17

## 2024-03-25 RX ADMIN — DORZOLAMIDE HYDROCHLORIDE AND TIMOLOL MALEATE 1 DROP: 20; 5 SOLUTION/ DROPS OPHTHALMIC at 19:59

## 2024-03-25 RX ADMIN — AMOXICILLIN AND CLAVULANATE POTASSIUM 1 TABLET: 500; 125 TABLET, FILM COATED ORAL at 12:00

## 2024-03-25 RX ADMIN — TRANEXAMIC ACID 1000 MG: 10 INJECTION, SOLUTION INTRAVENOUS at 07:29

## 2024-03-25 RX ADMIN — BRIMONIDINE TARTRATE 1 DROP: 2 SOLUTION OPHTHALMIC at 11:59

## 2024-03-25 RX ADMIN — ESCITALOPRAM OXALATE 20 MG: 20 TABLET ORAL at 16:18

## 2024-03-25 RX ADMIN — INSULIN GLARGINE 15 UNITS: 100 INJECTION, SOLUTION SUBCUTANEOUS at 19:57

## 2024-03-25 RX ADMIN — ATORVASTATIN CALCIUM 40 MG: 40 TABLET, FILM COATED ORAL at 16:18

## 2024-03-25 RX ADMIN — TRANEXAMIC ACID 1000 MG: 10 INJECTION, SOLUTION INTRAVENOUS at 07:41

## 2024-03-25 RX ADMIN — SODIUM CHLORIDE, PRESERVATIVE FREE 10 ML: 5 INJECTION INTRAVENOUS at 19:58

## 2024-03-25 RX ADMIN — PANTOPRAZOLE SODIUM 40 MG: 40 TABLET, DELAYED RELEASE ORAL at 12:00

## 2024-03-25 RX ADMIN — AMOXICILLIN AND CLAVULANATE POTASSIUM 1 TABLET: 500; 125 TABLET, FILM COATED ORAL at 19:59

## 2024-03-25 RX ADMIN — LATANOPROST 1 DROP: 50 SOLUTION OPHTHALMIC at 21:44

## 2024-03-25 RX ADMIN — LISINOPRIL 5 MG: 5 TABLET ORAL at 16:18

## 2024-03-25 RX ADMIN — INSULIN LISPRO 8 UNITS: 100 INJECTION, SOLUTION INTRAVENOUS; SUBCUTANEOUS at 15:24

## 2024-03-25 RX ADMIN — LEVETIRACETAM 500 MG: 500 TABLET, FILM COATED ORAL at 19:57

## 2024-03-25 RX ADMIN — INSULIN LISPRO 4 UNITS: 100 INJECTION, SOLUTION INTRAVENOUS; SUBCUTANEOUS at 21:46

## 2024-03-25 ASSESSMENT — ENCOUNTER SYMPTOMS
DIARRHEA: 0
VOMITING: 0
RHINORRHEA: 0
BACK PAIN: 0
CHEST TIGHTNESS: 0
CONSTIPATION: 0
ABDOMINAL DISTENTION: 0
ABDOMINAL PAIN: 0
SHORTNESS OF BREATH: 0
SORE THROAT: 0
NAUSEA: 0

## 2024-03-25 ASSESSMENT — PAIN SCALES - GENERAL
PAINLEVEL_OUTOF10: 5
PAINLEVEL_OUTOF10: 3
PAINLEVEL_OUTOF10: 5
PAINLEVEL_OUTOF10: 4
PAINLEVEL_OUTOF10: 3

## 2024-03-25 ASSESSMENT — PAIN DESCRIPTION - ORIENTATION
ORIENTATION: LEFT
ORIENTATION: LEFT
ORIENTATION: UPPER
ORIENTATION: LEFT

## 2024-03-25 ASSESSMENT — PAIN DESCRIPTION - LOCATION
LOCATION: BACK
LOCATION: HEAD
LOCATION: HEAD;NECK;SHOULDER
LOCATION: HEAD

## 2024-03-25 ASSESSMENT — PAIN DESCRIPTION - PAIN TYPE
TYPE: ACUTE PAIN

## 2024-03-25 ASSESSMENT — PAIN - FUNCTIONAL ASSESSMENT
PAIN_FUNCTIONAL_ASSESSMENT: ACTIVITIES ARE NOT PREVENTED
PAIN_FUNCTIONAL_ASSESSMENT: 0-10
PAIN_FUNCTIONAL_ASSESSMENT: ACTIVITIES ARE NOT PREVENTED
PAIN_FUNCTIONAL_ASSESSMENT: ACTIVITIES ARE NOT PREVENTED

## 2024-03-25 ASSESSMENT — PAIN DESCRIPTION - DESCRIPTORS
DESCRIPTORS: ACHING
DESCRIPTORS: DULL
DESCRIPTORS: ACHING
DESCRIPTORS: ACHING

## 2024-03-25 NOTE — H&P
Trauma H&P     Patient:  Hector Del Rio  Admit date: 3/25/2024   YOB: 1970 Date of Evaluation: 3/25/2024  MRN: [id number]  Acct: [id number]    Injury Date:03/25/2024 Injury time:  0600  PCP: Musa Dixon MD   Referring physician: Dr Shayan Wahl    Time of Trauma Surgeon Notification:  0708 March 25, 2024   Time of Trauma PETRA Arrival: 0830  Time of Trauma Surgeon Arrival:  0925 March 25, 2024  Services Requested Within 30 Minutes: N/A Time Contacted:    Assessment:    Trauma secondary to ground level fall   Subdural hematoma   Hx of detached right retina, diabetic retinopathy, HTN, hyperlipidemia, neuropathy, SCOUT, DM1    Plan:    Trauma secondary to ground level fall    - Fall precautions   - PT/OT/ST once cleared by NS     Subdural hematoma    - CT imaging concerning for 2 mm parafalcine SDH  - NPO until evaluated by neurosurgery   - Consult to Dr Tamela CRAMER on call and aware of consult    - Anticipate no intervention needed, will likely need repeat CT head in AM   - PT/OT/ST once cleared by NS    -Regular neuro checks   -Maintain systolic blood pressure between 100-160   -TXA given trauma dosing   -Elevate head of bed approximately 30 degrees   -Hold all blood thinning and antiplatelet medications   -Seizure precautions   -Further intervention pending neurosurgery recommendation     Hx of detached right retina, diabetic retinopathy, HTN, hyperlipidemia, neuropathy, SCOUT, DM1   - d/c patient's insulin pump   - blood sugar checks AC/HS with sliding scale coverage   - will consult intensivist for medical management of the above    DVT ppx: SCDs, HOLD Lovenox  GI ppx: Protonix   Full code     Activation: []Level I (Trauma Alert) []Level II (Injury Call) [x]Level III (Trauma Consult) []Downgraded  Mode of Arrival: family  Referring Facility: N/A   Loss of Consciousness []No [x]Yes[]Unknown  unknown Duration(min)  Mechanism of Injury:  []Motor Vehicle crash   []Single Vehicle [] []Passenger

## 2024-03-25 NOTE — ED PROVIDER NOTES
Transfer of Care Note:   Physician Signing out: David Pinzon MD  Receiving Physician: Shayan Wahl MD  Sign out time: 6 AM      Brief history:  Patient 53-year-old male presents emergency department from home brought in by EMS for evaluation of fall.  Patient reports that he got up this morning to let the dog out.  He believes he got up to let the dog back inside and his wife found him on the floor.  Patient's wife reports that she heard a loud thump and heard him moaning on the living room floor.  Wife reports that she found him laying on his back on the hardwood floor and he was unconscious at that time.  Patient reports after coming to on the floor he was dizzy and nauseous.  He denies being on blood thinners.  Patient denies remembering the cause of the fall.  He is not sure if he got lightheaded, tripped or had chest pain prior to the fall.  Currently patient reports pain in the left side of his neck into his left shoulder.  Denies any chest pain or shortness of breath.  Denies any abdominal pain.  No other acute complaints at this time.    Items pending that need to be checked:  Labs, CT      Tentative Impression of patient:  Syncope unknown reason-high risk    Expected disposition of patient:  Pending results, admitted.        Additional Assessment and results:   I have personally performed a face to face diagnostic evaluation on this patient. The patient's initial evaluation and plan have been discussed with the prior physician who initially evaluated the patient. Nursing Notes, Past Medical Hx, Past Surgical Hx, Social Hx, Allergies, vital signs and Family Hx were all reviewed.      Vitals:    03/25/24 0704   BP: (!) 140/91   Pulse: 93   Resp: 20   Temp:    SpO2: 98%     Physical Exam  Vitals and nursing note reviewed.   Constitutional:       General: He is not in acute distress.     Appearance: He is normal weight. He is not ill-appearing, toxic-appearing or diaphoretic.   HENT:      Head: 
 Nose: Nose normal.      Mouth/Throat:      Mouth: Mucous membranes are moist.      Pharynx: Oropharynx is clear.   Eyes:      Extraocular Movements: Extraocular movements intact.      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      Pulses: Normal pulses.      Heart sounds: Normal heart sounds. No murmur heard.  Pulmonary:      Effort: Pulmonary effort is normal. No respiratory distress.      Breath sounds: Normal breath sounds. No wheezing.   Abdominal:      Palpations: Abdomen is soft.      Tenderness: There is no abdominal tenderness.   Musculoskeletal:         General: Swelling present. Normal range of motion.      Cervical back: Normal range of motion. Tenderness present.   Skin:     General: Skin is warm.      Capillary Refill: Capillary refill takes less than 2 seconds.   Neurological:      General: No focal deficit present.      Mental Status: He is alert and oriented to person, place, and time.   Psychiatric:         Mood and Affect: Mood normal.         Behavior: Behavior normal.           ED RESULTS   Laboratory results (none if blank):  Labs Reviewed   CBC WITH AUTO DIFFERENTIAL   TROPONIN   BASIC METABOLIC PANEL   MAGNESIUM   POCT GLUCOSE     All laboratory results are individually reviewed and interpreted by me in the clinical context of this patient.  See ED course below for results interpretation if applicable.  (A negative COVID-19 test should be interpreted as COVID no longer suspected unless otherwise noted in this encounter documentation note)  (Any cultures that may have been sent were not resulted at the time of this patient ED visit)      Radiologic studies results available at the moment of this note (None if blank):  XR CHEST PORTABLE   Final Result   Impression:   1. No acute cardiopulmonary disease.      This document has been electronically signed by: Rey Alvares MD on    03/25/2024 05:49 AM      CT Head W/O Contrast    (Results Pending)

## 2024-03-25 NOTE — ED TRIAGE NOTES
Patient presents to ED by EMS with c/o fall with back pain. EMS reports that the patient went to take the dogs outside when he fell, had a positive LOC and was found laying supine by wife. Patient states that his only pain is in his upper back. EKG completed in triage. Call light in reach.

## 2024-03-25 NOTE — PLAN OF CARE
Problem: Chronic Conditions and Co-morbidities  Goal: Patient's chronic conditions and co-morbidity symptoms are monitored and maintained or improved  Outcome: Progressing     Problem: Discharge Planning  Goal: Discharge to home or other facility with appropriate resources  Outcome: Progressing     Problem: Safety - Adult  Goal: Free from fall injury  Outcome: Progressing     Problem: Pain  Goal: Verbalizes/displays adequate comfort level or baseline comfort level  Outcome: Adequate for Discharge

## 2024-03-25 NOTE — CONSULTS
[place], [place]                                          NEUROSURGICAL CONSULTATION NOTE       Hector Del Rio   YOB: 1970  Account Number: [id number]   Date of Examination: 3/25/2024    ASSESSMENT:    -This is this is a 53-year-old male s/p ground fall who underwent a brain CT that showed small about 2 to millimeter acute parafalcine subdural hemorrhage.  -GCS: 15/15  -Focal neurological deficit: No focal neurodeficit at this time.  -Patient is on baby aspirin.       PLAN:    -Medical management per ICU team and patient primary.  -Keep systolic blood pressure less than 160 and above 100.  -A dose of TXA to be given to the patient.  -Coagulation profile.  -Stop any anticoagulation medication at this time.  -Seizure precaution.  -New brain CT tomorrow morning.  -Stop the baby aspirin for 1 week  -Treatment of any other injuries are apparent to the corresponding services.  -Neurosurgery will follow.  - The case was discussed in detail with the ICU team, trauma team, ER and with the patient and his family.    HISTORY OF PRESENT ILLNESS:  Hector Del Rio is a 53 y.o. male, admitted on :3/25/2024  5:10 AM  This is a 53-year-old male who was brought to the ER for evaluation of potential injury sustained after a ground-level fall.  There is a history of loss of consciousness. there is no history of new focal neurological deficit.  Upon patient arrival to the ER she underwent a brain CT that showed 2 mm parafalcine acute subdural hemorrhage.    ROS:    Review of Systems   Constitutional:  Negative for fever.   HENT:  Negative for congestion.    Eyes:  Positive for visual disturbance.   Respiratory:  Negative for chest tightness.    Cardiovascular:  Negative for chest pain.   Gastrointestinal:  Negative for abdominal distention.   Genitourinary:  Negative for difficulty urinating.   Musculoskeletal:  Positive for myalgias.   Skin:  Negative for wound.   Neurological:  Positive for 
radiating to his left shoulder that is worse with rotating of head and relieved with immobility, but denies any chest pain or shortness of breath.    Patient does note mild headache at this time, stating approximately 3/10 intensity.  Denies any current nausea/vomiting at this time.    Past Medical History: Type 2 diabetes, hyperlipidemia, right retinal detachment,.  Family History:  Father: Heart disease, asthma, high blood pressure.  Social History: No tobacco use, alcohol use approximately once per month, occasional marijuana use.    ROS   As per HPI above    Scheduled Meds:   tranexamic acid  1,000 mg IntraVENous Once    pantoprazole  40 mg Oral Daily    ciprofloxacin-dexAMETHasone  4 drop Left Ear BID    amoxicillin-clavulanate  1 tablet Oral BID    sodium chloride flush  5-40 mL IntraVENous 2 times per day    polyethylene glycol  17 g Oral Daily    dorzolamide-timolol  1 drop Left Eye BID    brimonidine  1 drop Left Eye BID    latanoprost  1 drop Left Eye Nightly    insulin lispro  0-8 Units SubCUTAneous TID WC    insulin lispro  0-4 Units SubCUTAneous Nightly     Continuous Infusions:   sodium chloride      sodium chloride 75 mL/hr at 03/25/24 1227    dextrose         PHYSICAL EXAMINATION:  T:  98.  P: 84. RR: 16. B/P: 139/64. O2 Sat: 98%.  I/O: 397.8/0  Body mass index is 27.12 kg/m².   GCS:   15  General:   Acute on chronically ill-appearing.  HEENT:  normocephalic and atraumatic.  No scleral icterus.  Left eye reactive to light, right eye clouded 2/2 history of retinal detachment  Neck: supple.  No Thyromegaly. No tenderness to palpation of paraspinal musculature or vertebral spine.  Lungs: clear to auscultation.  No retractions  Cardiac: RRR.  No JVD.  Abdomen: soft.  Nontender.  Extremities:  No clubbing, cyanosis, or edema x 4.    Vasculature: capillary refill < 3 seconds. Palpable dorsalis pedis pulses.  Skin:  warm and dry.  Psych:  Alert and oriented x3.  Affect appropriate  Lymph:  No

## 2024-03-26 ENCOUNTER — APPOINTMENT (OUTPATIENT)
Dept: CT IMAGING | Age: 54
DRG: 084 | End: 2024-03-26
Payer: COMMERCIAL

## 2024-03-26 LAB
ANION GAP SERPL CALC-SCNC: 9 MEQ/L (ref 8–16)
APTT PPP: 33 SECONDS (ref 22–38)
BASOPHILS ABSOLUTE: 0.1 THOU/MM3 (ref 0–0.1)
BASOPHILS NFR BLD AUTO: 0.9 %
BUN SERPL-MCNC: 11 MG/DL (ref 7–22)
CALCIUM SERPL-MCNC: 8.7 MG/DL (ref 8.5–10.5)
CHLORIDE SERPL-SCNC: 101 MEQ/L (ref 98–111)
CO2 SERPL-SCNC: 26 MEQ/L (ref 23–33)
CREAT SERPL-MCNC: 0.8 MG/DL (ref 0.4–1.2)
DEPRECATED MEAN GLUCOSE BLD GHB EST-ACNC: 129 MG/DL (ref 70–126)
DEPRECATED RDW RBC AUTO: 40.2 FL (ref 35–45)
EOSINOPHIL NFR BLD AUTO: 1.5 %
EOSINOPHILS ABSOLUTE: 0.1 THOU/MM3 (ref 0–0.4)
ERYTHROCYTE [DISTWIDTH] IN BLOOD BY AUTOMATED COUNT: 12.8 % (ref 11.5–14.5)
GFR SERPL CREATININE-BSD FRML MDRD: > 90 ML/MIN/1.73M2
GLUCOSE BLD STRIP.AUTO-MCNC: 170 MG/DL (ref 70–108)
GLUCOSE BLD STRIP.AUTO-MCNC: 172 MG/DL (ref 70–108)
GLUCOSE BLD STRIP.AUTO-MCNC: 231 MG/DL (ref 70–108)
GLUCOSE BLD STRIP.AUTO-MCNC: 283 MG/DL (ref 70–108)
GLUCOSE SERPL-MCNC: 185 MG/DL (ref 70–108)
HBA1C MFR BLD HPLC: 6.3 % (ref 4.4–6.4)
HCT VFR BLD AUTO: 43.8 % (ref 42–52)
HGB BLD-MCNC: 15 GM/DL (ref 14–18)
IMM GRANULOCYTES # BLD AUTO: 0.02 THOU/MM3 (ref 0–0.07)
IMM GRANULOCYTES NFR BLD AUTO: 0.3 %
INR PPP: 1.1 (ref 0.85–1.13)
LYMPHOCYTES ABSOLUTE: 2.6 THOU/MM3 (ref 1–4.8)
LYMPHOCYTES NFR BLD AUTO: 34.2 %
MCH RBC QN AUTO: 29.8 PG (ref 26–33)
MCHC RBC AUTO-ENTMCNC: 34.2 GM/DL (ref 32.2–35.5)
MCV RBC AUTO: 86.9 FL (ref 80–94)
MONOCYTES ABSOLUTE: 0.7 THOU/MM3 (ref 0.4–1.3)
MONOCYTES NFR BLD AUTO: 9.4 %
NEUTROPHILS NFR BLD AUTO: 53.7 %
NRBC BLD AUTO-RTO: 0 /100 WBC
PLATELET # BLD AUTO: 266 THOU/MM3 (ref 130–400)
PMV BLD AUTO: 10.3 FL (ref 9.4–12.4)
POTASSIUM SERPL-SCNC: 4.6 MEQ/L (ref 3.5–5.2)
RBC # BLD AUTO: 5.04 MILL/MM3 (ref 4.7–6.1)
SEGMENTED NEUTROPHILS ABSOLUTE COUNT: 4.1 THOU/MM3 (ref 1.8–7.7)
SODIUM SERPL-SCNC: 136 MEQ/L (ref 135–145)
WBC # BLD AUTO: 7.6 THOU/MM3 (ref 4.8–10.8)

## 2024-03-26 PROCEDURE — 97162 PT EVAL MOD COMPLEX 30 MIN: CPT

## 2024-03-26 PROCEDURE — 99232 SBSQ HOSP IP/OBS MODERATE 35: CPT | Performed by: NEUROLOGICAL SURGERY

## 2024-03-26 PROCEDURE — 85730 THROMBOPLASTIN TIME PARTIAL: CPT

## 2024-03-26 PROCEDURE — 97129 THER IVNTJ 1ST 15 MIN: CPT

## 2024-03-26 PROCEDURE — 97167 OT EVAL HIGH COMPLEX 60 MIN: CPT

## 2024-03-26 PROCEDURE — 6370000000 HC RX 637 (ALT 250 FOR IP)

## 2024-03-26 PROCEDURE — 80048 BASIC METABOLIC PNL TOTAL CA: CPT

## 2024-03-26 PROCEDURE — 85610 PROTHROMBIN TIME: CPT

## 2024-03-26 PROCEDURE — 97530 THERAPEUTIC ACTIVITIES: CPT

## 2024-03-26 PROCEDURE — APPSS30 APP SPLIT SHARED TIME 16-30 MINUTES: Performed by: PHYSICIAN ASSISTANT

## 2024-03-26 PROCEDURE — 70450 CT HEAD/BRAIN W/O DYE: CPT

## 2024-03-26 PROCEDURE — 36415 COLL VENOUS BLD VENIPUNCTURE: CPT

## 2024-03-26 PROCEDURE — 83036 HEMOGLOBIN GLYCOSYLATED A1C: CPT

## 2024-03-26 PROCEDURE — 2580000003 HC RX 258: Performed by: NURSE PRACTITIONER

## 2024-03-26 PROCEDURE — 2060000000 HC ICU INTERMEDIATE R&B

## 2024-03-26 PROCEDURE — 97535 SELF CARE MNGMENT TRAINING: CPT

## 2024-03-26 PROCEDURE — 82948 REAGENT STRIP/BLOOD GLUCOSE: CPT

## 2024-03-26 PROCEDURE — 99232 SBSQ HOSP IP/OBS MODERATE 35: CPT | Performed by: INTERNAL MEDICINE

## 2024-03-26 PROCEDURE — 6370000000 HC RX 637 (ALT 250 FOR IP): Performed by: NURSE PRACTITIONER

## 2024-03-26 PROCEDURE — 85025 COMPLETE CBC W/AUTO DIFF WBC: CPT

## 2024-03-26 PROCEDURE — 99231 SBSQ HOSP IP/OBS SF/LOW 25: CPT | Performed by: SURGERY

## 2024-03-26 RX ORDER — INSULIN LISPRO 100 [IU]/ML
3 INJECTION, SOLUTION INTRAVENOUS; SUBCUTANEOUS ONCE
Status: COMPLETED | OUTPATIENT
Start: 2024-03-26 | End: 2024-03-26

## 2024-03-26 RX ORDER — GLUCAGON 1 MG/ML
1 KIT INJECTION PRN
Status: DISCONTINUED | OUTPATIENT
Start: 2024-03-26 | End: 2024-03-27 | Stop reason: HOSPADM

## 2024-03-26 RX ORDER — DEXTROSE MONOHYDRATE 100 MG/ML
INJECTION, SOLUTION INTRAVENOUS CONTINUOUS PRN
Status: DISCONTINUED | OUTPATIENT
Start: 2024-03-26 | End: 2024-03-27 | Stop reason: HOSPADM

## 2024-03-26 RX ADMIN — INSULIN LISPRO 3 UNITS: 100 INJECTION, SOLUTION INTRAVENOUS; SUBCUTANEOUS at 00:35

## 2024-03-26 RX ADMIN — DORZOLAMIDE HYDROCHLORIDE AND TIMOLOL MALEATE 1 DROP: 20; 5 SOLUTION/ DROPS OPHTHALMIC at 20:30

## 2024-03-26 RX ADMIN — ESCITALOPRAM OXALATE 20 MG: 20 TABLET ORAL at 08:04

## 2024-03-26 RX ADMIN — SODIUM CHLORIDE: 9 INJECTION, SOLUTION INTRAVENOUS at 00:11

## 2024-03-26 RX ADMIN — AMOXICILLIN AND CLAVULANATE POTASSIUM 1 TABLET: 500; 125 TABLET, FILM COATED ORAL at 20:28

## 2024-03-26 RX ADMIN — AMOXICILLIN AND CLAVULANATE POTASSIUM 1 TABLET: 500; 125 TABLET, FILM COATED ORAL at 08:03

## 2024-03-26 RX ADMIN — SODIUM CHLORIDE, PRESERVATIVE FREE 10 ML: 5 INJECTION INTRAVENOUS at 20:31

## 2024-03-26 RX ADMIN — LEVETIRACETAM 500 MG: 500 TABLET, FILM COATED ORAL at 20:37

## 2024-03-26 RX ADMIN — ATORVASTATIN CALCIUM 40 MG: 40 TABLET, FILM COATED ORAL at 08:04

## 2024-03-26 RX ADMIN — DORZOLAMIDE HYDROCHLORIDE AND TIMOLOL MALEATE 1 DROP: 20; 5 SOLUTION/ DROPS OPHTHALMIC at 08:09

## 2024-03-26 RX ADMIN — BRIMONIDINE TARTRATE 1 DROP: 2 SOLUTION OPHTHALMIC at 20:31

## 2024-03-26 RX ADMIN — PANTOPRAZOLE SODIUM 40 MG: 40 TABLET, DELAYED RELEASE ORAL at 08:04

## 2024-03-26 RX ADMIN — INSULIN LISPRO 8 UNITS: 100 INJECTION, SOLUTION INTRAVENOUS; SUBCUTANEOUS at 08:04

## 2024-03-26 RX ADMIN — LATANOPROST 1 DROP: 50 SOLUTION OPHTHALMIC at 20:30

## 2024-03-26 RX ADMIN — BRIMONIDINE TARTRATE 1 DROP: 2 SOLUTION OPHTHALMIC at 08:09

## 2024-03-26 RX ADMIN — LISINOPRIL 5 MG: 5 TABLET ORAL at 08:04

## 2024-03-26 RX ADMIN — LEVETIRACETAM 500 MG: 500 TABLET, FILM COATED ORAL at 08:07

## 2024-03-26 ASSESSMENT — ENCOUNTER SYMPTOMS
SHORTNESS OF BREATH: 0
CHEST TIGHTNESS: 0
APNEA: 0

## 2024-03-26 ASSESSMENT — PAIN DESCRIPTION - LOCATION
LOCATION: HEAD

## 2024-03-26 ASSESSMENT — PAIN SCALES - GENERAL
PAINLEVEL_OUTOF10: 0
PAINLEVEL_OUTOF10: 3
PAINLEVEL_OUTOF10: 3
PAINLEVEL_OUTOF10: 1

## 2024-03-26 ASSESSMENT — PAIN DESCRIPTION - DESCRIPTORS
DESCRIPTORS: ACHING

## 2024-03-26 ASSESSMENT — PAIN - FUNCTIONAL ASSESSMENT
PAIN_FUNCTIONAL_ASSESSMENT: ACTIVITIES ARE NOT PREVENTED

## 2024-03-26 ASSESSMENT — PAIN DESCRIPTION - ORIENTATION
ORIENTATION: LEFT

## 2024-03-26 ASSESSMENT — PAIN DESCRIPTION - PAIN TYPE
TYPE: ACUTE PAIN

## 2024-03-26 ASSESSMENT — PAIN DESCRIPTION - FREQUENCY: FREQUENCY: INTERMITTENT

## 2024-03-26 ASSESSMENT — PAIN DESCRIPTION - ONSET: ONSET: ON-GOING

## 2024-03-26 NOTE — PLAN OF CARE
Problem: Chronic Conditions and Co-morbidities  Goal: Patient's chronic conditions and co-morbidity symptoms are monitored and maintained or improved  3/26/2024 0909 by Mague Pereira  Outcome: Progressing     Problem: Discharge Planning  Goal: Discharge to home or other facility with appropriate resources  3/26/2024 0909 by Mague Pereira  Outcome: Progressing     Problem: Pain  Goal: Verbalizes/displays adequate comfort level or baseline comfort level  3/26/2024 0909 by Mague Pereira  Outcome: Progressing     Problem: Safety - Adult  Goal: Free from fall injury  3/26/2024 0909 by Mague Pereira  Outcome: Progressing     Care plan reviewed with patient.  Patient verbalizes understanding of the plan of care and contributes to goal setting.

## 2024-03-26 NOTE — PLAN OF CARE
Problem: Chronic Conditions and Co-morbidities  Goal: Patient's chronic conditions and co-morbidity symptoms are monitored and maintained or improved  3/25/2024 2224 by Kimmy Henderson RN  Outcome: Progressing  Flowsheets (Taken 3/25/2024 2224)  Care Plan - Patient's Chronic Conditions and Co-Morbidity Symptoms are Monitored and Maintained or Improved: Monitor and assess patient's chronic conditions and comorbid symptoms for stability, deterioration, or improvement     Problem: Discharge Planning  Goal: Discharge to home or other facility with appropriate resources  3/25/2024 2224 by Kimmy Henderson RN  Outcome: Progressing  Flowsheets (Taken 3/25/2024 2224)  Discharge to home or other facility with appropriate resources: Identify barriers to discharge with patient and caregiver     Problem: Pain  Goal: Verbalizes/displays adequate comfort level or baseline comfort level  3/25/2024 2224 by Kimmy Henderson RN  Outcome: Progressing  Flowsheets (Taken 3/25/2024 2224)  Verbalizes/displays adequate comfort level or baseline comfort level:   Encourage patient to monitor pain and request assistance   Assess pain using appropriate pain scale   Implement non-pharmacological measures as appropriate and evaluate response     Problem: Safety - Adult  Goal: Free from fall injury  3/25/2024 2224 by Kimmy Henderson, RN  Outcome: Progressing  Flowsheets (Taken 3/25/2024 2224)  Free From Fall Injury: Instruct family/caregiver on patient safety

## 2024-03-27 ENCOUNTER — TELEPHONE (OUTPATIENT)
Dept: SURGERY | Age: 54
End: 2024-03-27

## 2024-03-27 VITALS
RESPIRATION RATE: 16 BRPM | BODY MASS INDEX: 27.09 KG/M2 | HEART RATE: 86 BPM | HEIGHT: 72 IN | OXYGEN SATURATION: 97 % | TEMPERATURE: 97.8 F | DIASTOLIC BLOOD PRESSURE: 85 MMHG | WEIGHT: 200 LBS | SYSTOLIC BLOOD PRESSURE: 159 MMHG

## 2024-03-27 DIAGNOSIS — S06.5XAA SUBDURAL HEMATOMA (HCC): Primary | ICD-10-CM

## 2024-03-27 PROCEDURE — 2580000003 HC RX 258: Performed by: NURSE PRACTITIONER

## 2024-03-27 PROCEDURE — 6370000000 HC RX 637 (ALT 250 FOR IP)

## 2024-03-27 PROCEDURE — 6370000000 HC RX 637 (ALT 250 FOR IP): Performed by: NURSE PRACTITIONER

## 2024-03-27 RX ORDER — LISINOPRIL 5 MG/1
5 TABLET ORAL DAILY
Qty: 30 TABLET | Refills: 3 | Status: SHIPPED | OUTPATIENT
Start: 2024-03-27 | End: 2024-03-27 | Stop reason: HOSPADM

## 2024-03-27 RX ORDER — ONDANSETRON 4 MG/1
4 TABLET, ORALLY DISINTEGRATING ORAL EVERY 8 HOURS PRN
Qty: 30 TABLET | Refills: 0 | Status: SHIPPED | OUTPATIENT
Start: 2024-03-27

## 2024-03-27 RX ORDER — RAMIPRIL 2.5 MG/1
2.5 CAPSULE ORAL DAILY
Qty: 30 CAPSULE | Refills: 3 | Status: SHIPPED | OUTPATIENT
Start: 2024-03-27

## 2024-03-27 RX ORDER — LEVETIRACETAM 500 MG/1
500 TABLET ORAL 2 TIMES DAILY
Qty: 60 TABLET | Refills: 3 | Status: SHIPPED | OUTPATIENT
Start: 2024-03-27

## 2024-03-27 RX ADMIN — PANTOPRAZOLE SODIUM 40 MG: 40 TABLET, DELAYED RELEASE ORAL at 07:59

## 2024-03-27 RX ADMIN — ATORVASTATIN CALCIUM 40 MG: 40 TABLET, FILM COATED ORAL at 07:59

## 2024-03-27 RX ADMIN — AMOXICILLIN AND CLAVULANATE POTASSIUM 1 TABLET: 500; 125 TABLET, FILM COATED ORAL at 08:02

## 2024-03-27 RX ADMIN — DORZOLAMIDE HYDROCHLORIDE AND TIMOLOL MALEATE 1 DROP: 20; 5 SOLUTION/ DROPS OPHTHALMIC at 07:59

## 2024-03-27 RX ADMIN — BRIMONIDINE TARTRATE 1 DROP: 2 SOLUTION OPHTHALMIC at 07:59

## 2024-03-27 RX ADMIN — LEVETIRACETAM 500 MG: 500 TABLET, FILM COATED ORAL at 07:59

## 2024-03-27 RX ADMIN — SODIUM CHLORIDE, PRESERVATIVE FREE 10 ML: 5 INJECTION INTRAVENOUS at 07:59

## 2024-03-27 RX ADMIN — CIPROFLOXACIN AND DEXAMETHASONE 4 DROP: 3; 1 SUSPENSION/ DROPS AURICULAR (OTIC) at 07:59

## 2024-03-27 RX ADMIN — ESCITALOPRAM OXALATE 20 MG: 20 TABLET ORAL at 07:59

## 2024-03-27 RX ADMIN — LISINOPRIL 5 MG: 5 TABLET ORAL at 07:59

## 2024-03-27 ASSESSMENT — PAIN DESCRIPTION - DESCRIPTORS: DESCRIPTORS: ACHING

## 2024-03-27 ASSESSMENT — PAIN SCALES - GENERAL: PAINLEVEL_OUTOF10: 2

## 2024-03-27 ASSESSMENT — PAIN DESCRIPTION - FREQUENCY: FREQUENCY: INTERMITTENT

## 2024-03-27 ASSESSMENT — PAIN DESCRIPTION - ORIENTATION: ORIENTATION: LEFT

## 2024-03-27 ASSESSMENT — PAIN DESCRIPTION - LOCATION: LOCATION: HEAD

## 2024-03-27 ASSESSMENT — PAIN - FUNCTIONAL ASSESSMENT: PAIN_FUNCTIONAL_ASSESSMENT: ACTIVITIES ARE NOT PREVENTED

## 2024-03-27 ASSESSMENT — PAIN DESCRIPTION - PAIN TYPE: TYPE: ACUTE PAIN

## 2024-03-27 ASSESSMENT — PAIN DESCRIPTION - ONSET: ONSET: ON-GOING

## 2024-03-27 NOTE — DISCHARGE SUMMARY
Discharge Summary   Trauma Services  Dr Thacker     Patient Identification:  Hector Del Rio  : 1970  MRN: [id number]   Account: [id number]     Admit date: 3/25/2024  Discharge date: 24  Attending provider: Josué Thacker MD        Primary care provider: Musa Dixon MD     Discharge Diagnoses:   Principal Problem:    Subdural hematoma (HCC)  Active Problems:    Fall  Resolved Problems:    * No resolved hospital problems. *       Hospital Course:   Hector Del Rio is a 53 y.o. male admitted to [facility] on 3/25/2024 for subdural hematoma following a ground level fall with a known loss of consciousness.  Admitted under trauma services to . Inpatient management included as follows:    Plan:    Trauma secondary to ground level fall               - Fall precautions              - PT/OT/ST once cleared by NS      Subdural hematoma               - CT imaging concerning for 2 mm parafalcine SDH  - NPO until evaluated by neurosurgery              - Consult to Dr Tamela CRAMER on call and aware of consult               - Anticipate no intervention needed, will likely need repeat CT head in AM              - PT/OT/ST once cleared by NS               -Regular neuro checks              -Maintain systolic blood pressure between 100-160              -TXA given trauma dosing              -Elevate head of bed approximately 30 degrees              -Hold all blood thinning and antiplatelet medications              -Seizure precautions              -Further intervention pending neurosurgery recommendation      Hx of detached right retina, diabetic retinopathy, HTN, hyperlipidemia, neuropathy, SCOUT, DM1              - d/c patient's insulin pump              - blood sugar checks AC/HS with sliding scale coverage              - will consult intensivist for medical management of the above     DVT ppx: SCDs, HOLD Lovenox  GI ppx: Protonix   Full code      Activation: []Level I (Trauma Alert) []Level II

## 2024-03-27 NOTE — TELEPHONE ENCOUNTER
Patient had possible CT scan ordered at discharge for 7 day follow up? Pre service unable to see order, inquiring if new order CT Head WO contrast can be placed? Looks like PT was seen under trauma services, discharged today. CT order placed by Meagan Majano PA-C .

## 2024-03-27 NOTE — PROGRESS NOTES
CRITICAL CARE PROGRESS NOTE      Patient:  Hector Del Rio    Unit/Bed:4B-01/001-A  YOB: 1970  MRN: [id number]   PCP: Musa Dixon MD  Date of Admission: 3/25/2024  Chief Complaint:- s/p fall    Assessment and Plan:    Subdural hematoma, 2/2 traumatic fall, stable: 2 mm parafalcine subdural hematoma as noted by CT head.  1 episode of nausea vomiting prior to arrival to ED.  Trauma primary.  Consult placed to neurosurgery.  Neurosurgery following  Continue neuro checks every four hours  Repeat CT on 3/26 showed no interval change  Discuss with trauma, who will transfer to Stepdown   Traumatic fall with associated loss consciousness: Patient noted for traumatic fall morning of 3/24, found unconscious on ground by wife.  Fall precautions in place  Tylenol for pain PRN  Type 1 diabetes mellitus complicated by right eye retinal detachment: Patient uses insulin pump at home.  Holding insulin pump in inpatient setting 2/2 concerns for worsening confusion with associated subdural hematoma after complicated insulin management. Patient on Lantus and high dose SSI on 3/26.  Okay to resume insulin pump after transferring to Stepdown  Continue ramipril  Hyperlipidemia: Continue Lipitor  Anxiety: Continue Lexapro  GERD: Continue Protonix    INITIAL H AND P AND ICU COURSE:  Patient is a 53-year-old male, medical history of type 2 diabetes, hyperlipidemia, right eye retinal detachment 2/2 complication of diabetes, who presents to [facility] ED s/p fall at home.  Patient found down by wife on hardwood living with associated loss of consciousness.  Patient notes feeling dizzy and nauseous prior to fall, but otherwise no recollection.  In ED, patient did note left neck pain radiating to his left shoulder that is worse with rotating of head and relieved with immobility, but denies any chest pain or shortness of breath.    Patient does note mild headache at this time, stating approximately 3/10 intensity.  Denies any 
1114:  attempted to complete Trauma SBIRT but was notified by nurse that pt is currently sleeping . LIO to re-asses  
1633--SW attempted to complete trauma consult but family and RN standing at bedside speaking with ptKarley VACA to attempt again later.   
Addendum by Dr. Riley Rapp MD:  I have seen and examined the patient independently. Face to face evaluation and examination were performed.   The below evaluation and note have been reviewed. Labs and radiographs were reviewed.   I Have discussed with Neurosurgery PA/ NP about this patient in detail.  Time spent with patient 35 minutes.  Time could have been discontiguous. Time does not include procedures.  Time does include my direct assessment of the patient and coordination of care.  100% decision making by myself. Time represents more than 50% of the time involved with patient care by the neurosurgical team  I agree with below assessment and plan.  Please see my modifications mentioned below.      My additional comments and modifications:     No acute event over the night.  Feels better today.  Patient is awake alert oriented x 3 and follow commands x 4  Denies any new complaints.  Today brain CT showed a stable exam  PT and OT as tolerated.  There is no indication for any acute neurosurgical intervention at this time.  Patient can be discharged once she is cleared by other medical services.  He needs a new brain CT after 1 week and follow-up the result with patient primary care provider.  Follow-up with neurosurgery only as needed.  The baby aspirin after 1 week.  The case was discussed in detail with patient, his family and with his nurse.  All question and concerns were addressed and answered   From this time on, neurosurgery team will see this patient only as needed as long as he is in the hospital. Please call if you have any further questions or concerns regarding this patient.      Riley Rapp MD             Neurosurgery Progress Note    Patient:  Hector Del Rio      Unit/Bed:4B-01/001-A    YOB: 1970    MRN: [id number]     Acct: [id number]     Admit date: 3/25/2024    Chief Complaint   Patient presents with    Fall       Patient Seen, Chart, Physician notes, Labs, Radiology studies 
CLINICAL PHARMACY: DISCHARGE MED RECONCILIATION/REVIEW    [facility] Patient?: No  Total # of Interventions Recommended: 2 -   - Discontinued Medication #: 1  - Updated Order #: 1     Total # Interventions Accepted: 2  Intervention Severity:   - Level 1 Intervention Present?: 2  Time Spent (min): 15    Arslan AlejandroD   Pharmacy Resident  3/27/2024 8:38 AM    
[facility]  OCCUPATIONAL THERAPY MISSED TREATMENT NOTE  STRZ CVICU 4B  4B-01/001-A      Date: 3/25/2024  Patient Name: Hector Del Rio        CSN: [id number]   : 1970  (53 y.o.)  Gender: male                REASON FOR MISSED TREATMENT: Hold Treatment per Nursing and patient currently receiving TXA and has become increasingly dizzy. RN states this is likely due to TXA administration and requests hold until after TXA administration. Will attempt next availability                 
[facility]  INPATIENT OCCUPATIONAL THERAPY  STRZ CVICU 4B  EVALUATION    Time:   Time In: 08  Time Out: 08  Timed Code Treatment Minutes: 23 Minutes  Minutes: 32          Date: 3/26/2024  Patient Name: Hector Del Rio,   Gender: male      MRN: [id number]  : 1970  (53 y.o.)  Referring Practitioner: Citlali Montgomery, JESÚS - CNP  Diagnosis: subdrual hematoma  Additional Pertinent Hx: Mr. Del Rio is a 54 yo M with past medical history of right retinal detachment, diabetes mellitus 1, hypertension, hyperlipidemia, and diabetic neuropathy who scented to the emergency room for evaluation following a ground-level fall.  The evaluation found a 2 mm subdural hematoma and a level 3 trauma consult was placed.  Patient reports that he remembers getting up to let the dog outside and then laying back down on the couch.  He states that he heard the dog park singly signaling he wanted to be back inside and the  He remembers is waking up on the floor.  He does not remember if he was dizzy or lightheaded or if he tripped over something.  Patient's wife reportedly found him laying on the floor and patient complained of dizziness and nausea after he came to.     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 monitor BP per neuronote systolic between 100-160 and monitor headaches.    Subjective  Chart Reviewed: Yes, Orders, Progress Notes, History and Physical  Patient assessed for rehabilitation services?: Yes  Family / Caregiver Present: No    Subjective: RN approved OT session and present during beginning to administer medications. Patient continues to complain of dizziness, worse with transition from supine to EOB versus other transfers but is slightly present, quickly resolves.    Pain: 3/10: headache, RN aware 
Patient admitted to room 4B-1 from ED.  Patient oriented to room, call light, bed rails, phone, lights and bathroom.  Patient instructed about the schedule of the day including: vital sign frequency, lab draws, possible tests, frequency of MD and staff rounds, including RN/MD rounding, daily weights, and I &O's.  Patient instructed about prescribed diet, and television.  Bed alarm in place, patient aware of placement and reason. Bedside telemetry is in place, patient aware of placement and reason.  Bed locked, in lowest position, side rails up 3/4, call light within reach. Patient's wife and daughters at bedside. Patient has an insulin pump in working order at this time. Patient reports 5/10 headache and states he recently took Tylenol in the ED for it. Patient's head of bed below 30 degrees at this time due patient request for comfort and headache.   
Patient concerned with still increasing blood glucose (289 personal sensor). Also questioning how he will return to his insulin pump once discharged if he is given a long acting insulin. Dr. Malone at bedside now and updated on all patient's concerns.   
Patient discharged at this time. AVS reviewed in detail. Patient aware medications are available for  at [facility] in [place]. Patient is in stable conditions. No further questions or concerns at this time.   
Patient responsibility form signed by patient in placed in paper chart.  Communication form given to patient and education provided on use of form; patient vocalized understanding and states will let RN know when pump use is started.  Patient's wife \"on her way home to get supplies.\"  
Pharmacy Medication History Note      List of current medications patient is taking is complete.    Source of information: Patient's family member, dispense hx    Changes made to medication list:  Medications removed (include reason, ex. therapy complete or physician discontinued):  Prednisolone 1% ophthalmic - patient not using    Medications added/doses adjusted:  Add dorzolamide/timolol 22.3-6.8 mg/mL 1 drop in left eye twice daily  Complete directions brimonidine to 1 drop in left eye twice daily    Other notes (ex. Recent course of antibiotics, Coumadin dosing):  Patient has an insulin pump and uses Novolog; family is unable to provide pump settings  Patient last used Ciprodex suspension last week around Friday, 3/22/24  Patient had a 10 day course of cefdinir 300 mg twice daily that was filled on 3/4/24 for an ear infection. It did not improve and on 3/20/24 he was prescribed Augmentin 500-125 mg twice daily for another 10 days. The patient stopped taking this sometime last week but unsure what day.  Patient last took medications yesterday night 3/24/24  Denies use of other OTC or herbal medications.    Allergies reviewed    Electronically signed by Lashell Marin on 3/25/2024 at 10:54 AM     Shantell Romero, PharmD   Pharmacy Resident  3/25/2024 11:13 AM  
Prefect served trauma NP concerning patient's insulin pump. Insulin pump has been removed as ordered and patient educated on reasoning and blood glucose monitoring and sliding scale insulin orders. Patient has expressed his concern with going without insulin for periods of time. Patient is keeping his sensor on that will alert him if his blood glucose gets to high. Patient educated on the need to complete finger sticks as well.   
Pt was about going home and was anointed. It was appreciated.    03/27/24 1213   Encounter Summary   Encounter Overview/Reason  Initial Encounter   Service Provided For: Patient and family together   [facility]   Support System Spouse   Last Encounter  03/27/24  (Anointed)   Complexity of Encounter Low   Begin Time 1027   End Time  1033   Total Time Calculated 6 min   Spiritual/Emotional needs   Type Spiritual Support   Rituals, Rites and Sacraments   Type Anointing   Assessment/Intervention/Outcome   Assessment Calm   Intervention Active listening;Empowerment       
Stroke Folder given.   What is Stroke/CVA  Signs and Symptoms of stroke (BEFAST)  Treatments for Stroke  Personal Risk Factors for Stroke discussed  Education--Call 911      Patient/family has been educated on their personal risk factors of:  Yes: Hypertension  N/a : Previous stroke  N/a: previous TIA  Yes: Hyperlipidemia  N/a: smoking cessation  N/a: Atrial fibrillation  N/a: Carotid Artery stenosis  Yes: diabetes  N/a: other(specify)    They have been given hand outs on the following medications:  N/a asa  N/a Plavix  N/a coumadin  Yes: statins(Lipitor)  Yes: antihypertensive(Ramipril)    Treatment for stroke includes:  Risk factor modifications  Following the medication regime prescribed by physician      Educated on FAST-Face-Arm-Speech-Time    A stroke is a brain attack.Stroke is a brain injury. It occurs when the brain's blood supply is interrupted. Blood carries oxygen and nutrients to the brain. Without oxygen and nutrients from blood, brain tissue starts to die rapidly. This can happen in less than 10 minutes.    A stroke occurs when blood flow to the brain is blocked (called ischemic stroke). This is caused by one of the following:   Sudden decreased blood flow   Damage to a blood vessel supplying blood to the brain can occur suddenly from either:   Injury   A clot that forms and breaks off from another part of the body (such as the heart or neck)   There are certain conditions which predispose people to form blood clots, such as:   Cancer   Pregnancy   Atrial fibrillation   Certain autoimmune diseases   Local blood clot   A build-up of fatty substances ( atherosclerotic plaque ) along the inner lining of the artery causes:   Narrowing of artery   Reduced elasticity   Local inflammation   Blood protein defects leading to increased clotting tendency   Decreased blood flow in the artery   Clot in an artery supplying the brain   Inflammatory conditions in the blood vessels (vasculitis)   A stroke may also 
Utilize [facility] alcohol withdrawal scale (Based on Juan Modified Alcohol Withdrawal Scale).  Tabulate score based on classifications including Tremor, Sweating, Hallucination, Orientation, and Agitation.    Tremor: 0  Sweatin  Hallucinations: 0  Orientation: 0  Agitation: 0  Total Score: 0    Action perform as described below     Tremor:  No tremor is 0 points.  Tremor on movement is 1 point.  Tremor at rest is 2 points.  Sweating: No Sweat 0 points. Moist is 1 point.  Drenching sweats is 2 points.  Hallucinations: No present 0 points. Dissuadable is 1 point. Not dissuadable is 2 points.  Orientation: Oriented 0 points. Vague/detached 1 point. Disoriented/no contact 2 points.  Agitation: Calm 0 points.  Anxious 1 point. Panicky 2 points.    Check scale every 2 hours.  Discontinue scoring with 4 consecutive scorings of 0.  Scale 0: No phenobarbital given.  Re-assess every 60 minutes as needed.   Scale 1-3: Phenobarbital 130 mg IV over 3 minutes. Re-assess every 60 minutes as needed.  May administer every 60 minutes to a maximum dose of phenobarbital 1040 mg in 24 hours!  Scale 4-8: Phenobarbital 260 mg IV over 5 minutes.  Re-assess every 60 minutes as needed. May administer every 60 minutes to a maximum dose of phenobarbital 1040mg in 24 hours!  Scale 9-10: Transfer to ICU (if not already in ICU).  Administer 10mg/kg phenobarbital IV over 60 minutes.  Maximum dose phenobarbital is 1040mg in 24 hours!    
Utilize [facility] alcohol withdrawal scale (Based on Juan Modified Alcohol Withdrawal Scale).  Tabulate score based on classifications including Tremor, Sweating, Hallucination, Orientation, and Agitation.    Tremor: 0  Sweatin  Hallucinations: 0  Orientation: 0  Agitation: 0  Total Score: 0  Action perform as described below     Tremor:  No tremor is 0 points.  Tremor on movement is 1 point.  Tremor at rest is 2 points.  Sweating: No Sweat 0 points. Moist is 1 point.  Drenching sweats is 2 points.  Hallucinations: No present 0 points. Dissuadable is 1 point. Not dissuadable is 2 points.  Orientation: Oriented 0 points. Vague/detached 1 point. Disoriented/no contact 2 points.  Agitation: Calm 0 points.  Anxious 1 point. Panicky 2 points.    Check scale every 2 hours.  Discontinue scoring with 4 consecutive scorings of 0.  Scale 0: No phenobarbital given.  Re-assess every 60 minutes as needed.   Scale 1-3: Phenobarbital 130 mg IV over 3 minutes. Re-assess every 60 minutes as needed.  May administer every 60 minutes to a maximum dose of phenobarbital 1040 mg in 24 hours!  Scale 4-8: Phenobarbital 260 mg IV over 5 minutes.  Re-assess every 60 minutes as needed. May administer every 60 minutes to a maximum dose of phenobarbital 1040mg in 24 hours!  Scale 9-10: Transfer to ICU (if not already in ICU).  Administer 10mg/kg phenobarbital IV over 60 minutes.  Maximum dose phenobarbital is 1040mg in 24 hours!    
[facility]  SPEECH THERAPY  STRZ CVICU 4B  Speech - Language - Cognitive Evaluation + Clinical Swallow Evaluation    SLP Individual Minutes  Time In: 1353  Time Out: 1417  Minutes: 24  Timed Code Treatment Minutes: 0 Minutes     Speech, Language, Cognitive Evaluation: 16 minutes  Clinical Swallow Evaluation: 8 minutes    DIET ORDER RECOMMENDATIONS AFTER EVALUATION: Regular, thin liquids    Date: 3/25/2024  Patient Name: Hector Del Rio      CSN: [id number]   : 1970  (53 y.o.)  Gender: male   Referring Physician:  Citlali Montgomery, JESÚS - CNP  Diagnosis: Subdural hematoma  Precautions: Fall risk, low stimulation guidelines/protocol  History of Present Illness/Injury: Patient admitted to [facility] with above med dx; please refer to physician H&P for full details. Per chart review, \"52 yo M with past medical history of right retinal detachment, diabetes mellitus 1, hypertension, hyperlipidemia, and diabetic neuropathy who scented to the emergency room for evaluation following a ground-level fall.  The evaluation found a 2 mm subdural hematoma and a level 3 trauma consult was placed.  Patient reports that he remembers getting up to let the dog outside and then laying back down on the couch.  He states that he heard the dog park singly signaling he wanted to be back inside. He remembers is waking up on the floor.  He does not remember if he was dizzy or lightheaded or if he tripped over something.  Patient's wife reportedly found him laying on the floor and patient complained of dizziness and nausea after he came to.\"    [facility] 2024  IMPRESSION:  Impression:  1. 2 mm in thickness left frontal parafalcine hyperdense subdural   hematoma. No midline shift mass effect or herniation.  2. Cerebral volume loss, intracranial atherosclerotic disease and mild   sequela of chronic small vessel ischemic disease.    SLP consulted to complete clinical swallow evaluation and assessment of cognitive 
achievement of established goals and plan of care..     Plan for Next Session: higher level cognitive rehabilitation   Discharge Recommendations: Home with Home Exercise Program       Marysol Mandujano M.A., CCC-SLP [id number]      
tasks, assessment of data and development of plan of care and goals.  Treatment time included skilled education and facilitation of tasks to increase safety and independence with functional mobility for improved independence and quality of life.    Assessment:  Body Structures, Functions, Activity Limitations Requiring Skilled Therapeutic Intervention: Decreased functional mobility , Decreased endurance, Decreased balance  Assessment: Hector Del Rio is a 53 y.o. male that presents with SDH. Pt demonstrates a decrease in baseline by way of bed mobility, transfers and ambulation secondary to decreased activity tolerance, strength, fatigue, and balance deficits. Pt will benefit from skilled PT services throughout admission and beyond hospital discharge for improvements in functional mobility and in order to decrease fall risk and return pt to PLOF.     Therapy Prognosis: Good    Requires PT Follow-Up: Yes    Discharge Recommendations:  Discharge Recommendations: 24 hour supervision or assist    Patient Education:      .    Patient Education  Education Given To: Patient, Family  Education Provided: Role of Therapy, Plan of Care  Education Method: Verbal  Barriers to Learning: None  Education Outcome: Verbalized understanding       Equipment Recommendations:  Equipment Needed: No    Plan:  Current Treatment Recommendations: Strengthening, Balance training, Gait training, Functional mobility training, Stair training, Therapeutic activities, Transfer training, Neuromuscular re-education, Patient/Caregiver education & training  General Plan:  (3-5x T)    Goals:  Patient Goals : to go home  Short Term Goals  Time Frame for Short Term Goals: by discharge  Short Term Goal 1: Pt to transfer supine <--> sit mod I to enable pt to get in/out of bed.  Short Term Goal 2: Pt to transfer sit <--> stand mod I for increased functional mobility.  Short Term Goal 3: Pt to ambulate >500 feet without AD mod I for community re-entry.  Short 
are intact. Odontoid and atlanto-dental interval intact. The pars interarticularis of C2 is intact. There is normal vertebral body heights with no evidence of compression fracture. There is normal cervical alignment. No locked or perched facets. No spinous process fractures. Lordotic curvature is straightened. The retropharyngeal soft tissues are not widened. Segmental analysis as below (MR is more accurate in the evaluation of disc herniation and central canal pathology): C2-C3: Uncovertebral body spurs C3-C4: Uncovertebral body spurs and degenerative facet hypertrophy more so on the left C4-C5: Uncovertebral body spurs and degenerative facet hypertrophy bilaterally C5-C6: Uncovertebral body spurs and degenerative facet hypertrophy more so on the left C6-C7: Uncovertebral body spurs and degenerative facet hypertrophy C7-T1: No herniation or stenosis. The bones are without evidence of lytic or blastic lesion. Lung apices are unremarkable.  Impression: 1. Negative CT cervical spine for acute process. This document has been electronically signed by: Rey Alvares MD on 03/25/2024 06:53 AM All CTs at this facility use dose modulation techniques and iterative reconstructions, and/or weight-based dosing when appropriate to reduce radiation to a low as reasonably achievable.    XR CHEST PORTABLE    Result Date: 3/25/2024  1 view chest x-ray. Comparison: CR/SR - XR CHEST STANDARD (2 VW) - 06/23/2021 03:52 PM EDT Findings: Normal lung volumes. Lungs are clear. No pneumothorax or pleural effusion. Heart size normal. No passive venous congestion. No midline shift or tracheal deviation. No acute fracture.     Impression: 1. No acute cardiopulmonary disease. This document has been electronically signed by: Rey Alvares MD on 03/25/2024 05:49 AM           15 Minutes spent in patient care collectively between subjective/objective examination, chart review, documentation, clinical reasoning and discussion with attending

## 2024-03-27 NOTE — CARE COORDINATION
03/27/24 7:11 AM    Pt transferred to 4B13. Handoff report given to CHAYA Fields CM.   
3/27/24, 8:58 AM EDT    Patient goals/plan/ treatment preferences discussed by  and .  Patient goals/plan/ treatment preferences reviewed with patient/ family.  Patient/ family verbalize understanding of discharge plan and are in agreement with goal/plan/treatment preferences.  Understanding was demonstrated using the teach back method.  AVS provided by RN at time of discharge, which includes all necessary medical information pertaining to the patients current course of illness, treatment, post-discharge goals of care, and treatment preferences.     Services At/After Discharge: None               
Stable    The Plan for Transition of Care is related to the following treatment goals of Subdural hematoma (HCC) [S06.5XAA]  Fall, initial encounter [W19.XXXA]    Patient Goals/Plan/Treatment Preferences: Home with wife. Denies needs, declines HH.     Transportation/Food Security/Housekeeping Addressed: No issues identified.     Reshma Hooper, RN  Case Management Department

## 2024-03-28 ENCOUNTER — TELEPHONE (OUTPATIENT)
Dept: FAMILY MEDICINE CLINIC | Age: 54
End: 2024-03-28

## 2024-03-28 LAB
EKG ATRIAL RATE: 89 BPM
EKG P AXIS: 63 DEGREES
EKG P-R INTERVAL: 184 MS
EKG Q-T INTERVAL: 354 MS
EKG QRS DURATION: 74 MS
EKG QTC CALCULATION (BAZETT): 430 MS
EKG R AXIS: 53 DEGREES
EKG T AXIS: 76 DEGREES
EKG VENTRICULAR RATE: 89 BPM

## 2024-03-28 NOTE — TELEPHONE ENCOUNTER
Care Transitions Initial Follow Up Call    Outreach made within 2 business days of discharge: Yes    Patient: Hector Del Rio Patient : 1970   MRN: [id number]  Reason for Admission: There are no discharge diagnoses documented for the most recent discharge.  Discharge Date: 3/27/24       Spoke with: Patient    Discharge department/facility: [facility]    TCM Interactive Patient Contact:  Was patient able to fill all prescriptions: Yes  Was patient instructed to bring all medications to the follow-up visit: NO  Is patient taking all medications as directed in the discharge summary? Yes  Does patient understand their discharge instructions: Yes  Does patient have questions or concerns that need addressed prior to 7-14 day follow up office visit: no    Scheduled appointment with PCP within 7-14 days    Follow Up  Future Appointments   Date Time Provider Department Center   4/3/2024 10:00 AM STR [facility] CT IMAGING [facility]   4/3/2024 11:15 AM Musa Dixon MD [facility]

## 2024-04-03 ENCOUNTER — HOSPITAL ENCOUNTER (OUTPATIENT)
Dept: CT IMAGING | Age: 54
Discharge: HOME | End: 2024-04-03
Payer: COMMERCIAL

## 2024-04-03 DIAGNOSIS — S06.5XAA SUBDURAL HEMATOMA (HCC): ICD-10-CM

## 2024-04-03 PROCEDURE — 70450 CT HEAD/BRAIN W/O DYE: CPT

## 2024-04-05 ENCOUNTER — OFFICE VISIT (OUTPATIENT)
Dept: FAMILY MEDICINE CLINIC | Age: 54
End: 2024-04-05

## 2024-04-05 VITALS
SYSTOLIC BLOOD PRESSURE: 102 MMHG | BODY MASS INDEX: 27.12 KG/M2 | TEMPERATURE: 97.7 F | OXYGEN SATURATION: 98 % | DIASTOLIC BLOOD PRESSURE: 68 MMHG | RESPIRATION RATE: 16 BRPM | HEART RATE: 65 BPM | WEIGHT: 200 LBS

## 2024-04-05 DIAGNOSIS — Z09 HOSPITAL DISCHARGE FOLLOW-UP: ICD-10-CM

## 2024-04-05 DIAGNOSIS — S06.5XAA SUBDURAL HEMATOMA (HCC): Primary | ICD-10-CM

## 2024-04-05 DIAGNOSIS — E10.8 CONTROLLED DIABETES MELLITUS TYPE 1 WITH COMPLICATIONS (HCC): ICD-10-CM

## 2024-04-05 SDOH — ECONOMIC STABILITY: INCOME INSECURITY: HOW HARD IS IT FOR YOU TO PAY FOR THE VERY BASICS LIKE FOOD, HOUSING, MEDICAL CARE, AND HEATING?: NOT HARD AT ALL

## 2024-04-05 SDOH — ECONOMIC STABILITY: FOOD INSECURITY: WITHIN THE PAST 12 MONTHS, THE FOOD YOU BOUGHT JUST DIDN'T LAST AND YOU DIDN'T HAVE MONEY TO GET MORE.: NEVER TRUE

## 2024-04-05 SDOH — ECONOMIC STABILITY: FOOD INSECURITY: WITHIN THE PAST 12 MONTHS, YOU WORRIED THAT YOUR FOOD WOULD RUN OUT BEFORE YOU GOT MONEY TO BUY MORE.: NEVER TRUE
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
## 2024-04-28 DIAGNOSIS — E10.8 CONTROLLED DIABETES MELLITUS TYPE 1 WITH COMPLICATIONS (HCC): ICD-10-CM

## 2024-04-28 DIAGNOSIS — Z96.41 INSULIN PUMP IN PLACE: ICD-10-CM

## 2024-04-29 RX ORDER — LEVETIRACETAM 500 MG/1
500 TABLET ORAL 2 TIMES DAILY
Qty: 60 TABLET | Refills: 3 | Status: SHIPPED | OUTPATIENT
Start: 2024-04-29

## 2024-04-29 NOTE — TELEPHONE ENCOUNTER
Received the following message today thru My Chart:      Good Evening Dr. Dixon,     After my fall and minor brain bleed I was prescribed a 30 day supply, 60 pills, of Kempra.     I just took my last pill.     Is it okay to stop this medication cold turkey?     The only direction I received was take one tablet by mouth 2 times daily.     Any information would be appreciated.     Thanks ,     Isacc Del Rio

## 2024-04-30 RX ORDER — INSULIN ASPART 100 [IU]/ML
INJECTION, SOLUTION INTRAVENOUS; SUBCUTANEOUS
Qty: 80 ML | Refills: 1 | OUTPATIENT
Start: 2024-04-30

## 2024-05-22 RX ORDER — ATORVASTATIN CALCIUM 10 MG/1
10 TABLET, FILM COATED ORAL DAILY
Qty: 90 TABLET | Refills: 3 | OUTPATIENT
Start: 2024-05-22

## 2024-05-31 DIAGNOSIS — E78.5 HYPERLIPIDEMIA, UNSPECIFIED HYPERLIPIDEMIA TYPE: ICD-10-CM

## 2024-05-31 RX ORDER — ATORVASTATIN CALCIUM 40 MG/1
40 TABLET, FILM COATED ORAL DAILY
Qty: 90 TABLET | Refills: 1 | OUTPATIENT
Start: 2024-05-31

## 2024-07-09 ENCOUNTER — OFFICE VISIT (OUTPATIENT)
Dept: FAMILY MEDICINE CLINIC | Age: 54
End: 2024-07-09
Payer: MEDICARE

## 2024-07-09 VITALS
TEMPERATURE: 98 F | RESPIRATION RATE: 16 BRPM | HEART RATE: 63 BPM | DIASTOLIC BLOOD PRESSURE: 60 MMHG | WEIGHT: 195 LBS | SYSTOLIC BLOOD PRESSURE: 102 MMHG | OXYGEN SATURATION: 98 % | BODY MASS INDEX: 26.45 KG/M2

## 2024-07-09 DIAGNOSIS — E10.8 CONTROLLED DIABETES MELLITUS TYPE 1 WITH COMPLICATIONS (HCC): Primary | ICD-10-CM

## 2024-07-09 DIAGNOSIS — Z12.5 SCREENING PSA (PROSTATE SPECIFIC ANTIGEN): ICD-10-CM

## 2024-07-09 DIAGNOSIS — E78.5 HYPERLIPIDEMIA, UNSPECIFIED HYPERLIPIDEMIA TYPE: ICD-10-CM

## 2024-07-09 PROCEDURE — G8419 CALC BMI OUT NRM PARAM NOF/U: HCPCS | Performed by: FAMILY MEDICINE

## 2024-07-09 PROCEDURE — 3017F COLORECTAL CA SCREEN DOC REV: CPT | Performed by: FAMILY MEDICINE

## 2024-07-09 PROCEDURE — G8427 DOCREV CUR MEDS BY ELIG CLIN: HCPCS | Performed by: FAMILY MEDICINE

## 2024-07-09 PROCEDURE — 1036F TOBACCO NON-USER: CPT | Performed by: FAMILY MEDICINE

## 2024-07-09 PROCEDURE — 3044F HG A1C LEVEL LT 7.0%: CPT | Performed by: FAMILY MEDICINE

## 2024-07-09 PROCEDURE — 99214 OFFICE O/P EST MOD 30 MIN: CPT | Performed by: FAMILY MEDICINE

## 2024-07-09 PROCEDURE — 2022F DILAT RTA XM EVC RTNOPTHY: CPT | Performed by: FAMILY MEDICINE

## 2024-07-09 RX ORDER — AZITHROMYCIN 250 MG/1
TABLET, FILM COATED ORAL
COMMUNITY
Start: 2024-07-08

## 2024-07-09 RX ORDER — INSULIN LISPRO 100 [IU]/ML
INJECTION, SOLUTION INTRAVENOUS; SUBCUTANEOUS
Qty: 80 ML | Refills: 3 | Status: SHIPPED | OUTPATIENT
Start: 2024-07-09

## 2024-07-13 ENCOUNTER — LAB (OUTPATIENT)
Dept: LAB | Age: 54
End: 2024-07-13

## 2024-07-13 DIAGNOSIS — Z12.5 SCREENING PSA (PROSTATE SPECIFIC ANTIGEN): ICD-10-CM

## 2024-07-13 DIAGNOSIS — E10.8 CONTROLLED DIABETES MELLITUS TYPE 1 WITH COMPLICATIONS (HCC): ICD-10-CM

## 2024-07-13 LAB
ALBUMIN SERPL BCG-MCNC: 4.2 G/DL (ref 3.5–5.1)
ALP SERPL-CCNC: 72 U/L (ref 38–126)
ALT SERPL W/O P-5'-P-CCNC: 10 U/L (ref 11–66)
ANION GAP SERPL CALC-SCNC: 7 MEQ/L (ref 8–16)
AST SERPL-CCNC: 15 U/L (ref 5–40)
BILIRUB SERPL-MCNC: 0.4 MG/DL (ref 0.3–1.2)
BUN SERPL-MCNC: 13 MG/DL (ref 7–22)
CALCIUM SERPL-MCNC: 8.9 MG/DL (ref 8.5–10.5)
CHLORIDE SERPL-SCNC: 105 MEQ/L (ref 98–111)
CHOLEST SERPL-MCNC: 148 MG/DL (ref 100–199)
CO2 SERPL-SCNC: 27 MEQ/L (ref 23–33)
CREAT SERPL-MCNC: 0.7 MG/DL (ref 0.4–1.2)
CREAT UR-MCNC: 117.1 MG/DL
DEPRECATED MEAN GLUCOSE BLD GHB EST-ACNC: 132 MG/DL (ref 70–126)
GFR SERPL CREATININE-BSD FRML MDRD: > 90 ML/MIN/1.73M2
GLUCOSE SERPL-MCNC: 121 MG/DL (ref 70–108)
HBA1C MFR BLD HPLC: 6.4 % (ref 4.4–6.4)
HDLC SERPL-MCNC: 47 MG/DL
LDLC SERPL CALC-MCNC: 89 MG/DL
MICROALBUMIN UR-MCNC: < 1.2 MG/DL
MICROALBUMIN/CREAT RATIO PNL UR: 10 MG/G (ref 0–30)
POTASSIUM SERPL-SCNC: 4.5 MEQ/L (ref 3.5–5.2)
PROT SERPL-MCNC: 6.5 G/DL (ref 6.1–8)
PSA SERPL-MCNC: 0.59 NG/ML (ref 0–1)
SODIUM SERPL-SCNC: 139 MEQ/L (ref 135–145)
TRIGL SERPL-MCNC: 61 MG/DL (ref 0–199)

## 2024-07-13 ASSESSMENT — ENCOUNTER SYMPTOMS
EYE PAIN: 0
ABDOMINAL DISTENTION: 0
RHINORRHEA: 0
SHORTNESS OF BREATH: 0
SORE THROAT: 0
ABDOMINAL PAIN: 0
CONSTIPATION: 0
SINUS PRESSURE: 0
NAUSEA: 0
DIARRHEA: 0
COUGH: 0

## 2024-07-13 NOTE — PROGRESS NOTES
[facility] PROFESSIONAL SERVS  [facility]  [address] [address]  Dept: [phone]  Dept Fax: [phone]  Loc: [phone]  PROGRESS NOTE      VisitDate: 7/9/2024    Hector Del Rio is a 53 y.o. male who presents today for:  Chief Complaint   Patient presents with    3 Month Follow-Up     Discuss switching insulin to Humalog-req A1C, discuss keppra and all meds he is taking-grandmother and father passed away from suspected abd perforation        Impression/Plan:  1. 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 Screening PSA (prostate specific antigen)    3. Hyperlipidemia, unspecified hyperlipidemia type      Requested Prescriptions     Signed Prescriptions Disp Refills    insulin lispro (HUMALOG) 100 UNIT/ML SOLN injection vial 80 mL 3     Sig: INJECT AS DIRECTED PER PHYSICIAN MAX 80 UNITS PER DAY     Orders Placed This Encounter   Procedures    Hemoglobin A1C     Standing Status:   Future     Number of Occurrences:   1     Standing Expiration Date:   7/9/2025    Lipid Panel     Standing Status:   Future     Number of Occurrences:   1     Standing Expiration Date:   7/9/2025     Order Specific Question:   Is Patient Fasting?/# of Hours     Answer:   yes/12    Comprehensive Metabolic Panel     Standing Status:   Future     Number of Occurrences:   1     Standing Expiration Date:   7/9/2025    Microalbumin / Creatinine Urine Ratio     Standing Status:   Future     Number of Occurrences:   1     Standing Expiration Date:   7/9/2025    PSA Screening     Standing Status:   Future     Number of Occurrences:   1     Standing Expiration Date:   7/9/2025         Subjective:  HPI  Patient comes in follow-up diabetes stable doing well.  Has been on needs reconnect with endocrinology.  Insurance requires change of type of insulin he is using.  Due for routine labs blood pressure stable    Review of Systems   Constitutional:  Negative for appetite change  4 = No assist / stand by assistance

## 2024-08-16 RX ORDER — PANTOPRAZOLE SODIUM 40 MG/1
TABLET, DELAYED RELEASE ORAL
Qty: 90 TABLET | Refills: 3 | Status: SHIPPED | OUTPATIENT
Start: 2024-08-16

## 2024-08-19 DIAGNOSIS — E78.5 HYPERLIPIDEMIA, UNSPECIFIED HYPERLIPIDEMIA TYPE: ICD-10-CM

## 2024-08-19 RX ORDER — ATORVASTATIN CALCIUM 40 MG/1
40 TABLET, FILM COATED ORAL DAILY
Qty: 90 TABLET | Refills: 3 | Status: SHIPPED | OUTPATIENT
Start: 2024-08-19

## 2024-08-20 NOTE — TELEPHONE ENCOUNTER
Elevated 1 hr. Needs 3 hr. Order placed. Normal blood count Last visit- 8/20/2019  Next visit- 10/15/2019    Requested Prescriptions     Pending Prescriptions Disp Refills    insulin aspart (NOVOLOG) 100 UNIT/ML injection vial [Pharmacy Med Name: NOVOLOG 100 UNIT/ML VIAL] 80 mL 1     Sig: PATIENT IS USING A MAX OF 80 UNITS PER DAY.

## 2024-10-01 RX ORDER — RAMIPRIL 2.5 MG/1
2.5 CAPSULE ORAL DAILY
Qty: 30 CAPSULE | Refills: 3 | Status: SHIPPED | OUTPATIENT
Start: 2024-10-01

## 2024-10-21 DIAGNOSIS — E10.8 CONTROLLED DIABETES MELLITUS TYPE 1 WITH COMPLICATIONS (HCC): ICD-10-CM

## 2024-10-21 DIAGNOSIS — Z96.41 INSULIN PUMP IN PLACE: ICD-10-CM

## 2024-10-21 RX ORDER — INSULIN ASPART 100 [IU]/ML
INJECTION, SOLUTION INTRAVENOUS; SUBCUTANEOUS
Qty: 80 ML | Refills: 1 | OUTPATIENT
Start: 2024-10-21

## 2024-11-06 RX ORDER — INSULIN ASPART 100 [IU]/ML
INJECTION, SOLUTION INTRAVENOUS; SUBCUTANEOUS
Qty: 80 ML | Refills: 1 | OUTPATIENT
Start: 2024-11-06

## 2024-11-13 RX ORDER — ESCITALOPRAM OXALATE 20 MG/1
20 TABLET ORAL DAILY
Qty: 90 TABLET | Refills: 3 | Status: SHIPPED | OUTPATIENT
Start: 2024-11-13

## 2024-11-14 ENCOUNTER — OFFICE VISIT (OUTPATIENT)
Dept: FAMILY MEDICINE CLINIC | Age: 54
End: 2024-11-14

## 2024-11-14 VITALS
BODY MASS INDEX: 25.99 KG/M2 | SYSTOLIC BLOOD PRESSURE: 118 MMHG | WEIGHT: 191.6 LBS | DIASTOLIC BLOOD PRESSURE: 68 MMHG | TEMPERATURE: 98 F | OXYGEN SATURATION: 96 % | HEART RATE: 74 BPM

## 2024-11-14 DIAGNOSIS — Z00.00 MEDICARE ANNUAL WELLNESS VISIT, INITIAL: ICD-10-CM

## 2024-11-14 DIAGNOSIS — Z12.11 SCREEN FOR COLON CANCER: ICD-10-CM

## 2024-11-14 DIAGNOSIS — E78.5 HYPERLIPIDEMIA, UNSPECIFIED HYPERLIPIDEMIA TYPE: ICD-10-CM

## 2024-11-14 DIAGNOSIS — Z00.00 INITIAL MEDICARE ANNUAL WELLNESS VISIT: Primary | ICD-10-CM

## 2024-11-14 DIAGNOSIS — E10.8 CONTROLLED DIABETES MELLITUS TYPE 1 WITH COMPLICATIONS (HCC): ICD-10-CM

## 2024-11-14 ASSESSMENT — PATIENT HEALTH QUESTIONNAIRE - PHQ9
1. LITTLE INTEREST OR PLEASURE IN DOING THINGS: NOT AT ALL
SUM OF ALL RESPONSES TO PHQ QUESTIONS 1-9: 0
SUM OF ALL RESPONSES TO PHQ QUESTIONS 1-9: 0
2. FEELING DOWN, DEPRESSED OR HOPELESS: NOT AT ALL
SUM OF ALL RESPONSES TO PHQ9 QUESTIONS 1 & 2: 0
SUM OF ALL RESPONSES TO PHQ QUESTIONS 1-9: 0
SUM OF ALL RESPONSES TO PHQ QUESTIONS 1-9: 0

## 2024-11-14 ASSESSMENT — LIFESTYLE VARIABLES
HOW MANY STANDARD DRINKS CONTAINING ALCOHOL DO YOU HAVE ON A TYPICAL DAY: 1 OR 2
HOW OFTEN DO YOU HAVE A DRINK CONTAINING ALCOHOL: 2-3 TIMES A WEEK

## 2024-11-14 NOTE — PROGRESS NOTES
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  Recommended screening schedule for the next 5-10 years is provided to the patient in written form: see Patient Instructions/AVS.     No follow-ups on file.     Subjective   The following acute and/or chronic problems were also addressed today:   Diagnosis Orders   1. 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 Hyperlipidemia, unspecified hyperlipidemia type        3. Medicare annual wellness visit, initial              Patient's complete Health Risk Assessment and screening values have been reviewed and are found in Flowsheets. The following problems were reviewed today and where indicated follow up appointments were made and/or referrals ordered.    Positive Risk Factor Screenings with Interventions:        Drug Use:   Substance and Sexual Activity   Drug Use Yes    Frequency: 1.0 times per week    Types: Marijuana (Weed)             Interventions:  Patient advised to follow up in the office for further evaluation and treatment               Safety:  Do you have any tripping hazards - loose or unsecured carpets or rugs?: (!) Yes    Interventions:  Patient advised to follow up in the office for further evaluation and treatment       History of Present Illness  The patient presents for a wellness visit.    He is seeking an influenza vaccine and wishes to have his A1c levels checked. He has not received the shingles vaccine and does not believe it is necessary. His bowel movements are regular and he has no issues with urination.    Supplemental Information  He gets Eylea HD injection every month for his eyes.    IMMUNIZATIONS  He

## 2024-11-14 NOTE — PATIENT INSTRUCTIONS
instruction, always ask your healthcare professional. [employer] disclaims any warranty or liability for your use of this information.           A Healthy Heart: Care Instructions  Overview     Coronary artery disease, also called heart disease, occurs when a substance called plaque builds up in the vessels that supply oxygen-rich blood to your heart muscle. This can narrow the blood vessels and reduce blood flow. A heart attack happens when blood flow is completely blocked. A high-fat diet, smoking, and other factors increase the risk of heart disease.  Your doctor has found that you have a chance of having heart disease. A heart-healthy lifestyle can help keep your heart healthy and prevent heart disease. This lifestyle includes eating healthy, being active, staying at a weight that's healthy for you, and not smoking or using tobacco. It also includes taking medicines as directed, managing other health conditions, and trying to get a healthy amount of sleep.  Follow-up care is a key part of your treatment and safety. Be sure to make and go to all appointments, and call your doctor if you are having problems. It's also a good idea to know your test results and keep a list of the medicines you take.  How can you care for yourself at home?  Diet    Use less salt when you cook and eat. This helps lower your blood pressure. Taste food before salting. Add only a little salt when you think you need it. With time, your taste buds will adjust to less salt.     Eat fewer snack items, fast foods, canned soups, and other high-salt, high-fat, processed foods.     Read food labels and try to avoid saturated and trans fats. They increase your risk of heart disease by raising cholesterol levels.     Limit the amount of solid fat--butter, margarine, and shortening--you eat. Use olive, peanut, or canola oil when you cook. Bake, broil, and steam foods instead of frying them.     Eat a variety of fruit and vegetables

## 2024-12-30 LAB
CHOLESTEROL, TOTAL: 175 MG/DL
CHOLESTEROL/HDL RATIO: NORMAL
ESTIMATED AVERAGE GLUCOSE: 137
HBA1C MFR BLD: 6.4 %
HDLC SERPL-MCNC: 61 MG/DL (ref 35–70)
LDL CHOLESTEROL: 101
NONHDLC SERPL-MCNC: NORMAL MG/DL
TRIGL SERPL-MCNC: 66 MG/DL
VLDLC SERPL CALC-MCNC: 13 MG/DL

## 2025-01-15 ENCOUNTER — TELEPHONE (OUTPATIENT)
Dept: FAMILY MEDICINE CLINIC | Age: 55
End: 2025-01-15

## 2025-01-15 NOTE — TELEPHONE ENCOUNTER
Hemoglobin A1c 6.4 unchanged.  Cholesterol levels are excellent kidney liver function blood electrolytes all normal

## 2025-01-17 DIAGNOSIS — E10.8 CONTROLLED DIABETES MELLITUS TYPE 1 WITH COMPLICATIONS (HCC): ICD-10-CM

## 2025-01-17 DIAGNOSIS — Z96.41 INSULIN PUMP IN PLACE: ICD-10-CM

## 2025-01-17 RX ORDER — INSULIN LISPRO 100 [IU]/ML
INJECTION, SOLUTION INTRAVENOUS; SUBCUTANEOUS
Qty: 80 ML | Refills: 3 | Status: SHIPPED | OUTPATIENT
Start: 2025-01-17

## 2025-01-20 DIAGNOSIS — E10.8 CONTROLLED DIABETES MELLITUS TYPE 1 WITH COMPLICATIONS (HCC): ICD-10-CM

## 2025-01-20 DIAGNOSIS — Z96.41 INSULIN PUMP IN PLACE: ICD-10-CM

## 2025-01-20 RX ORDER — INSULIN ASPART 100 [IU]/ML
INJECTION, SOLUTION INTRAVENOUS; SUBCUTANEOUS
Qty: 80 ML | Refills: 1 | Status: SHIPPED | OUTPATIENT
Start: 2025-01-20 | End: 2025-01-21

## 2025-01-21 RX ORDER — INSULIN LISPRO 100 [IU]/ML
INJECTION, SOLUTION INTRAVENOUS; SUBCUTANEOUS
Qty: 1 ML | Refills: 0 | Status: SHIPPED | OUTPATIENT
Start: 2025-01-21 | End: 2025-01-22 | Stop reason: SDUPTHER

## 2025-01-22 ENCOUNTER — TELEPHONE (OUTPATIENT)
Dept: FAMILY MEDICINE CLINIC | Age: 55
End: 2025-01-22

## 2025-01-22 DIAGNOSIS — Z96.41 INSULIN PUMP IN PLACE: ICD-10-CM

## 2025-01-22 DIAGNOSIS — E10.8 CONTROLLED DIABETES MELLITUS TYPE 1 WITH COMPLICATIONS (HCC): ICD-10-CM

## 2025-01-22 RX ORDER — INSULIN LISPRO 100 [IU]/ML
INJECTION, SOLUTION INTRAVENOUS; SUBCUTANEOUS
Qty: 80 ML | Refills: 3 | Status: CANCELLED | OUTPATIENT
Start: 2025-01-22

## 2025-01-22 RX ORDER — INSULIN LISPRO 100 [IU]/ML
INJECTION, SOLUTION INTRAVENOUS; SUBCUTANEOUS
Qty: 90 ML | Refills: 3 | Status: SHIPPED | OUTPATIENT
Start: 2025-01-22

## 2025-01-22 NOTE — TELEPHONE ENCOUNTER
Asking if you can take over prescribing his insulin, he does not want to see the diabetic clinic. He will still see them for his pump supplies only. This ok?

## 2025-01-22 NOTE — TELEPHONE ENCOUNTER
LOV 11/14/25    Pt notified, he does need a refill on his humalog vials, pt uses max 80 units/day, [facility]

## 2025-01-27 ENCOUNTER — OFFICE VISIT (OUTPATIENT)
Dept: INTERNAL MEDICINE CLINIC | Age: 55
End: 2025-01-27

## 2025-01-27 VITALS
WEIGHT: 209.6 LBS | HEIGHT: 72 IN | HEART RATE: 75 BPM | BODY MASS INDEX: 28.39 KG/M2 | SYSTOLIC BLOOD PRESSURE: 112 MMHG | DIASTOLIC BLOOD PRESSURE: 60 MMHG | OXYGEN SATURATION: 98 %

## 2025-01-27 DIAGNOSIS — I10 HYPERTENSION, UNSPECIFIED TYPE: ICD-10-CM

## 2025-01-27 DIAGNOSIS — E78.5 HYPERLIPIDEMIA, UNSPECIFIED HYPERLIPIDEMIA TYPE: ICD-10-CM

## 2025-01-27 DIAGNOSIS — E10.8 CONTROLLED DIABETES MELLITUS TYPE 1 WITH COMPLICATIONS (HCC): Primary | ICD-10-CM

## 2025-01-27 SDOH — ECONOMIC STABILITY: FOOD INSECURITY: WITHIN THE PAST 12 MONTHS, YOU WORRIED THAT YOUR FOOD WOULD RUN OUT BEFORE YOU GOT MONEY TO BUY MORE.: NEVER TRUE

## 2025-01-27 SDOH — ECONOMIC STABILITY: FOOD INSECURITY: WITHIN THE PAST 12 MONTHS, THE FOOD YOU BOUGHT JUST DIDN'T LAST AND YOU DIDN'T HAVE MONEY TO GET MORE.: NEVER TRUE

## 2025-01-27 ASSESSMENT — PATIENT HEALTH QUESTIONNAIRE - PHQ9
1. LITTLE INTEREST OR PLEASURE IN DOING THINGS: NOT AT ALL
SUM OF ALL RESPONSES TO PHQ QUESTIONS 1-9: 0
2. FEELING DOWN, DEPRESSED OR HOPELESS: NOT AT ALL
SUM OF ALL RESPONSES TO PHQ QUESTIONS 1-9: 0
SUM OF ALL RESPONSES TO PHQ9 QUESTIONS 1 & 2: 0

## 2025-02-06 RX ORDER — RAMIPRIL 2.5 MG/1
CAPSULE ORAL DAILY
Qty: 90 CAPSULE | Refills: 2 | Status: SHIPPED | OUTPATIENT
Start: 2025-02-06

## 2025-02-06 RX ORDER — TADALAFIL 5 MG/1
5 TABLET ORAL DAILY
Qty: 90 TABLET | Refills: 2 | Status: SHIPPED | OUTPATIENT
Start: 2025-02-06

## 2025-02-06 NOTE — TELEPHONE ENCOUNTER
Last seen 11/14/24 for AWV    Pt is scheduled for AWV 11/17/25    Primary coverage shows [employer] and secondary Medicare A&B. Jackelyn was informed to verify insurance and change 11/17/25 appt to regular px if Medicare is secondary.

## 2025-02-25 RX ORDER — RAMIPRIL 2.5 MG/1
CAPSULE ORAL DAILY
Qty: 90 CAPSULE | Refills: 2 | Status: CANCELLED | OUTPATIENT
Start: 2025-02-25

## 2025-06-30 RX ORDER — PANTOPRAZOLE SODIUM 40 MG/1
40 TABLET, DELAYED RELEASE ORAL DAILY
Qty: 90 TABLET | Refills: 3 | Status: SHIPPED | OUTPATIENT
Start: 2025-06-30

## 2025-07-28 ENCOUNTER — OFFICE VISIT (OUTPATIENT)
Dept: INTERNAL MEDICINE CLINIC | Age: 55
End: 2025-07-28
Payer: MEDICARE

## 2025-07-28 VITALS
WEIGHT: 201.4 LBS | SYSTOLIC BLOOD PRESSURE: 124 MMHG | BODY MASS INDEX: 27.28 KG/M2 | DIASTOLIC BLOOD PRESSURE: 76 MMHG | OXYGEN SATURATION: 98 % | HEIGHT: 72 IN | HEART RATE: 78 BPM

## 2025-07-28 DIAGNOSIS — I10 HYPERTENSION, UNSPECIFIED TYPE: ICD-10-CM

## 2025-07-28 DIAGNOSIS — E78.5 HYPERLIPIDEMIA, UNSPECIFIED HYPERLIPIDEMIA TYPE: ICD-10-CM

## 2025-07-28 DIAGNOSIS — F41.9 ANXIETY: ICD-10-CM

## 2025-07-28 DIAGNOSIS — E10.65 TYPE 1 DIABETES MELLITUS WITH HYPERGLYCEMIA (HCC): Primary | ICD-10-CM

## 2025-07-28 LAB — HBA1C MFR BLD: 5.5 % (ref 4.3–5.7)

## 2025-07-28 PROCEDURE — G8427 DOCREV CUR MEDS BY ELIG CLIN: HCPCS

## 2025-07-28 PROCEDURE — 3078F DIAST BP <80 MM HG: CPT

## 2025-07-28 PROCEDURE — 3017F COLORECTAL CA SCREEN DOC REV: CPT

## 2025-07-28 PROCEDURE — G8419 CALC BMI OUT NRM PARAM NOF/U: HCPCS

## 2025-07-28 PROCEDURE — 2022F DILAT RTA XM EVC RTNOPTHY: CPT

## 2025-07-28 PROCEDURE — 99212 OFFICE O/P EST SF 10 MIN: CPT

## 2025-07-28 PROCEDURE — 3074F SYST BP LT 130 MM HG: CPT

## 2025-07-28 PROCEDURE — 83036 HEMOGLOBIN GLYCOSYLATED A1C: CPT

## 2025-07-28 PROCEDURE — 1036F TOBACCO NON-USER: CPT

## 2025-07-28 PROCEDURE — 3044F HG A1C LEVEL LT 7.0%: CPT

## 2025-07-28 ASSESSMENT — ENCOUNTER SYMPTOMS
WHEEZING: 0
EYES NEGATIVE: 1
CHEST TIGHTNESS: 0
COUGH: 0
DIARRHEA: 0
NAUSEA: 0
VOMITING: 0
SHORTNESS OF BREATH: 0
ABDOMINAL PAIN: 0
ALLERGIC/IMMUNOLOGIC NEGATIVE: 1

## 2025-07-28 NOTE — PROGRESS NOTES
1970    Chief Complaint   Patient presents with    Diabetes    Hypertension    Hyperlipidemia    Sunburn     Bilateral feet-just got home yesterday from vacation       Pt is a 54 y.o. male who presents for a follow up visit. Pt with PMHx of HTN, HLD, type 1 diabetes, GERD. Pt with Hgb A1C of 5.5% today, down from 6.4%. Pt's Tandem showing average blood glucose of 152, with pt in range 74% of the time. Pt with daily use of 54 units insulin via insulin pump. Pt follows with podiatrist and optho for foot and eye exams. Pt with normal BP in clinic. Last lipid panel WNL     Diabetes  Pertinent negatives for hypoglycemia include no dizziness. Pertinent negatives for diabetes include no chest pain, no fatigue and no weakness.   Hypertension  Pertinent negatives include no chest pain, palpitations or shortness of breath.   Hyperlipidemia  Pertinent negatives include no chest pain or shortness of breath.         Past Medical History:   Diagnosis Date    Anxiety     Detached retina 08/2018    right    Diabetic retinopathy (HCC)     bilat, has had laser and injections    Ganglion cyst     Heart burn     Hyperlipidemia     Hypertension     Insulin pump in place     Neuropathy     Pneumonia     at age 23    Retinal detachment     RIGHT    Sleep apnea     has Cpap at home but does not wear like suppose to    Type I (juvenile type) diabetes mellitus without mention of complication, not stated as uncontrolled     12/1982    Wears glasses     usually uses contact lenses       Past Surgical History:   Procedure Laterality Date    CYST REMOVAL  1992    rt foot, ganglion    EYE SURGERY      has had laser to both eyes in office    LAPAROSCOPIC APPENDECTOMY N/A 12/3/2021    LAP APPENDECTOMY performed by Gladis Leos MD at [facility] [[email or web address]] OR    AK OFFICE/OUTPT VISIT,PROCEDURE ONLY Right 8/15/2018    VITRECTOMY 23 GAUGE, MEMBRANE PEELING, AIR FLUID EXCHANGE, AIR GAS EXCHANGE WITH 16% C3F8, ENDOLASER, 200 MSECONDS, 200 MWATTS, 809 PULSES

## 2025-08-29 DIAGNOSIS — E78.5 HYPERLIPIDEMIA, UNSPECIFIED HYPERLIPIDEMIA TYPE: ICD-10-CM

## 2025-08-29 RX ORDER — ATORVASTATIN CALCIUM 40 MG/1
40 TABLET, FILM COATED ORAL DAILY
Qty: 90 TABLET | Refills: 3 | Status: SHIPPED | OUTPATIENT
Start: 2025-08-29

## (undated) DEVICE — HYPODERMIC SAFETY NEEDLE: Brand: MAGELLAN

## (undated) DEVICE — 60 ML SYRINGE LUER-LOCK TIP: Brand: MONOJECT

## (undated) DEVICE — AGENT VISCOELASTIC 1ML 2% HYDROXYPROPYL METHCELL PRELD GLS

## (undated) DEVICE — GOWN,AURORA,NONREINFORCED,LARGE: Brand: MEDLINE

## (undated) DEVICE — RELOAD STPL SZ 0 L45MM DIA3.5MM 0DEG STD REG TISS BLU TI

## (undated) DEVICE — 1 EACH 40411 STERILE DISPOSABLE SUPER VIEW® LENS SET & 1 EACH 40100 STERILE MICROSCOPE DRAPE: Brand: SUPER VIEW® PACK

## (undated) DEVICE — GLOVE SURG SZ 75 CRM LTX FREE POLYISOPRENE POLYMER BEAD ANTI

## (undated) DEVICE — PAD,EYE,1-5/8X2 5/8,STERILE,LF,1/PK: Brand: MEDLINE

## (undated) DEVICE — CUTTER ENDOSCP L340MM LIN ARTC SGL STROKE FIRING ENDOPATH

## (undated) DEVICE — CAUTERY SURG 23GA BPLR 6 PER BX

## (undated) DEVICE — DISPOSABLE BIPOLAR CABLE, 12FT (3.6M): Brand: KIRWAN

## (undated) DEVICE — 6 ML SYRINGE LUER-LOCK TIP: Brand: MONOJECT

## (undated) DEVICE — STANDARD HYPODERMIC NEEDLE,ALUMINUM HUB: Brand: MONOJECT

## (undated) DEVICE — RELOAD STPL H1-2.5X45MM VASC THN TISS WHT 6 ROW B FRM SGL

## (undated) DEVICE — 23GA EZPASS SOFT TIP CANNULA BOX OF 5: Brand: VORTEX SURGICAL INC

## (undated) DEVICE — RETINA PK

## (undated) DEVICE — 23 GA FLEXIBLE TIP LASER PROBE: Brand: ALCON, ENGAUGE

## (undated) DEVICE — DRESSING TRNSPAR W2XL2.75IN FLM SHT SEMIPERMEABLE WIND

## (undated) DEVICE — DISCONTINUED USE 435154 INVENTORY EXISTS CSFILTER SYRINGE BL ST SLGS033SS (50/BX)

## (undated) DEVICE — SURGICAL PROCEDURE PACK 23 GA VITRECTOMY

## (undated) DEVICE — GLOVE ORANGE PI 7   MSG9070

## (undated) DEVICE — SOLUTION IRRIG 1000ML PENTALYTE PLAS POUR BTL TIS U SOL

## (undated) DEVICE — GLOVE SURG SZ 7 L12IN FNGR THK94MIL TRNSLUC YEL LTX HYDRGEL

## (undated) DEVICE — GLOVE SURG SZ 65 THK91MIL LTX FREE SYN POLYISOPRENE

## (undated) DEVICE — SYRINGE, LUER LOCK, 10ML: Brand: MEDLINE

## (undated) DEVICE — GARMENT,MEDLINE,DVT,INT,CALF,MED, GEN2: Brand: MEDLINE

## (undated) DEVICE — NEEDLE SPINAL 22GA L3.5IN SPINOCAN

## (undated) DEVICE — ADHESIVE SKIN CLSR 0.7ML TOP DERMBND ADV

## (undated) DEVICE — GLOVE ORANGE PI 7 1/2   MSG9075

## (undated) DEVICE — SUTURE ETHBND D SPEC NO 0 UR 6 30IN D9436

## (undated) DEVICE — VISCOUS FLUID CONTROL PAK: Brand: CONSTELLATION

## (undated) DEVICE — SOLUTION ANTIFOG VIS SYS CLEARIFY LAPSCP

## (undated) DEVICE — 23GA ACTU8 ADAPTIVE FORCEPS BOX OF 5: Brand: VORTEX SURGICAL INC

## (undated) DEVICE — TROCARS: Brand: KII® BLUNT TIP ACCESS SYSTEM

## (undated) DEVICE — 3 ML SYRINGE LUER-LOCK TIP: Brand: MONOJECT

## (undated) DEVICE — Z DUPLICATE USE 2431315 SET INSUF TBNG HI FLO W/ SMK EVAC FOR PNEUMOCLEAR

## (undated) DEVICE — PENCIL SMK EVAC ALL IN 1 DSGN ENH VISIBILITY IMPROVED AIR

## (undated) DEVICE — GOWN,AURORA,NONRNF,XL,30/CS: Brand: MEDLINE

## (undated) DEVICE — APPLIER CLP M L L11.4IN DIA10MM ENDOSCP ROT MULT FOR LIG

## (undated) DEVICE — PUMP SUC IRR TBNG L10FT W/ HNDPC ASSEMB STRYKEFLOW 2

## (undated) DEVICE — SUTURE VCRL SZ 7-0 L12IN ABSRB VLT L6.5MM TG140-8 3/8 CIR J566G

## (undated) DEVICE — TROCAR: Brand: KII SHIELDED BLADED ACCESS SYSTEM

## (undated) DEVICE — SUTURE VCRL + SZ 0 L27IN ABSRB VLT L26MM UR-6 5/8 CIR VCP603H

## (undated) DEVICE — NEEDLE OPHTH 25GA L15IN RETROBLB TIP

## (undated) DEVICE — GENERAL LAPAROSCOPY PACK-LF: Brand: MEDLINE INDUSTRIES, INC.

## (undated) DEVICE — NEEDLE FLTR 18GA L1.5IN MEM THK5UM BLNT DISP

## (undated) DEVICE — COVER,MAYO STAND,STERILE: Brand: MEDLINE

## (undated) DEVICE — UNIVERSAL MONOPOLAR LAPAROSCOPIC CABLE 10FT, 4MM PIN CONNECTOR: Brand: CONMED

## (undated) DEVICE — COVER LT HNDL BLU PLAS